# Patient Record
Sex: FEMALE | Race: WHITE | Employment: OTHER | URBAN - METROPOLITAN AREA
[De-identification: names, ages, dates, MRNs, and addresses within clinical notes are randomized per-mention and may not be internally consistent; named-entity substitution may affect disease eponyms.]

---

## 2017-01-30 RX ORDER — CARVEDILOL 25 MG/1
TABLET ORAL
Qty: 180 TABLET | Refills: 3 | Status: SHIPPED | OUTPATIENT
Start: 2017-01-30 | End: 2017-05-22 | Stop reason: SDUPTHER

## 2017-01-30 RX ORDER — LOSARTAN POTASSIUM 100 MG/1
TABLET ORAL
Qty: 90 TABLET | Refills: 3 | Status: SHIPPED | OUTPATIENT
Start: 2017-01-30 | End: 2017-05-22 | Stop reason: SDUPTHER

## 2017-01-30 RX ORDER — FUROSEMIDE 40 MG/1
TABLET ORAL
Qty: 90 TABLET | Refills: 3 | Status: SHIPPED | OUTPATIENT
Start: 2017-01-30 | End: 2017-05-22 | Stop reason: SDUPTHER

## 2017-02-22 ENCOUNTER — OFFICE VISIT (OUTPATIENT)
Dept: ORTHOPEDIC SURGERY | Age: 77
End: 2017-02-22

## 2017-02-22 VITALS
WEIGHT: 249 LBS | BODY MASS INDEX: 41.48 KG/M2 | DIASTOLIC BLOOD PRESSURE: 77 MMHG | HEIGHT: 65 IN | HEART RATE: 75 BPM | SYSTOLIC BLOOD PRESSURE: 173 MMHG

## 2017-02-22 DIAGNOSIS — Z96.651 STATUS POST TOTAL RIGHT KNEE REPLACEMENT: ICD-10-CM

## 2017-02-22 DIAGNOSIS — M17.12 PRIMARY OSTEOARTHRITIS OF LEFT KNEE: Primary | ICD-10-CM

## 2017-02-22 PROCEDURE — 73562 X-RAY EXAM OF KNEE 3: CPT | Performed by: PHYSICIAN ASSISTANT

## 2017-02-22 PROCEDURE — 20610 DRAIN/INJ JOINT/BURSA W/O US: CPT | Performed by: PHYSICIAN ASSISTANT

## 2017-02-22 PROCEDURE — 99214 OFFICE O/P EST MOD 30 MIN: CPT | Performed by: PHYSICIAN ASSISTANT

## 2017-05-15 ENCOUNTER — TELEPHONE (OUTPATIENT)
Dept: INTERNAL MEDICINE | Age: 77
End: 2017-05-15

## 2017-05-15 DIAGNOSIS — R30.0 DYSURIA: Primary | ICD-10-CM

## 2017-05-15 DIAGNOSIS — E78.5 HYPERLIPIDEMIA, UNSPECIFIED HYPERLIPIDEMIA TYPE: ICD-10-CM

## 2017-05-15 DIAGNOSIS — I10 ESSENTIAL HYPERTENSION, BENIGN: ICD-10-CM

## 2017-05-15 LAB
A/G RATIO: 1.6 (ref 1.1–2.2)
ALBUMIN SERPL-MCNC: 4.2 G/DL (ref 3.4–5)
ALP BLD-CCNC: 86 U/L (ref 40–129)
ALT SERPL-CCNC: 15 U/L (ref 10–40)
ANION GAP SERPL CALCULATED.3IONS-SCNC: 12 MMOL/L (ref 3–16)
AST SERPL-CCNC: 14 U/L (ref 15–37)
BACTERIA: ABNORMAL /HPF
BASOPHILS ABSOLUTE: 0 K/UL (ref 0–0.2)
BASOPHILS RELATIVE PERCENT: 1.1 %
BILIRUB SERPL-MCNC: 1.9 MG/DL (ref 0–1)
BILIRUBIN URINE: NEGATIVE
BLOOD, URINE: NEGATIVE
BUN BLDV-MCNC: 15 MG/DL (ref 7–20)
CALCIUM SERPL-MCNC: 9.7 MG/DL (ref 8.3–10.6)
CHLORIDE BLD-SCNC: 102 MMOL/L (ref 99–110)
CHOLESTEROL, TOTAL: 201 MG/DL (ref 0–199)
CLARITY: CLEAR
CO2: 28 MMOL/L (ref 21–32)
COLOR: YELLOW
CREAT SERPL-MCNC: 0.5 MG/DL (ref 0.6–1.2)
EOSINOPHILS ABSOLUTE: 0.2 K/UL (ref 0–0.6)
EOSINOPHILS RELATIVE PERCENT: 4.9 %
EPITHELIAL CELLS, UA: 3 /HPF (ref 0–5)
GFR AFRICAN AMERICAN: >60
GFR NON-AFRICAN AMERICAN: >60
GLOBULIN: 2.7 G/DL
GLUCOSE BLD-MCNC: 93 MG/DL (ref 70–99)
GLUCOSE URINE: NEGATIVE MG/DL
HCT VFR BLD CALC: 42.1 % (ref 36–48)
HDLC SERPL-MCNC: 59 MG/DL (ref 40–60)
HEMOGLOBIN: 14.1 G/DL (ref 12–16)
HYALINE CASTS: 0 /LPF (ref 0–8)
KETONES, URINE: NEGATIVE MG/DL
LDL CHOLESTEROL CALCULATED: 115 MG/DL
LEUKOCYTE ESTERASE, URINE: ABNORMAL
LYMPHOCYTES ABSOLUTE: 1.3 K/UL (ref 1–5.1)
LYMPHOCYTES RELATIVE PERCENT: 28.5 %
MCH RBC QN AUTO: 30.6 PG (ref 26–34)
MCHC RBC AUTO-ENTMCNC: 33.5 G/DL (ref 31–36)
MCV RBC AUTO: 91.6 FL (ref 80–100)
MICROSCOPIC EXAMINATION: YES
MONOCYTES ABSOLUTE: 0.4 K/UL (ref 0–1.3)
MONOCYTES RELATIVE PERCENT: 8.1 %
NEUTROPHILS ABSOLUTE: 2.6 K/UL (ref 1.7–7.7)
NEUTROPHILS RELATIVE PERCENT: 57.4 %
NITRITE, URINE: NEGATIVE
PDW BLD-RTO: 13.5 % (ref 12.4–15.4)
PH UA: 6.5
PLATELET # BLD: 200 K/UL (ref 135–450)
PMV BLD AUTO: 9.2 FL (ref 5–10.5)
POTASSIUM SERPL-SCNC: 5 MMOL/L (ref 3.5–5.1)
PROTEIN UA: NEGATIVE MG/DL
RBC # BLD: 4.59 M/UL (ref 4–5.2)
RBC UA: 1 /HPF (ref 0–4)
SODIUM BLD-SCNC: 142 MMOL/L (ref 136–145)
SPECIFIC GRAVITY UA: 1.01
TOTAL PROTEIN: 6.9 G/DL (ref 6.4–8.2)
TRIGL SERPL-MCNC: 135 MG/DL (ref 0–150)
TSH REFLEX FT4: 3.14 UIU/ML (ref 0.27–4.2)
URINE REFLEX TO CULTURE: YES
URINE TYPE: ABNORMAL
UROBILINOGEN, URINE: 0.2 E.U./DL
VLDLC SERPL CALC-MCNC: 27 MG/DL
WBC # BLD: 4.6 K/UL (ref 4–11)
WBC UA: 4 /HPF (ref 0–5)

## 2017-05-17 LAB
ORGANISM: ABNORMAL
URINE CULTURE, ROUTINE: ABNORMAL

## 2017-05-17 RX ORDER — CIPROFLOXACIN 500 MG/1
500 TABLET, FILM COATED ORAL 2 TIMES DAILY
Qty: 14 TABLET | Refills: 0 | Status: SHIPPED | OUTPATIENT
Start: 2017-05-17 | End: 2018-09-10 | Stop reason: SDUPTHER

## 2017-05-22 ENCOUNTER — OFFICE VISIT (OUTPATIENT)
Dept: INTERNAL MEDICINE | Age: 77
End: 2017-05-22

## 2017-05-22 VITALS
HEIGHT: 65 IN | DIASTOLIC BLOOD PRESSURE: 70 MMHG | HEART RATE: 72 BPM | BODY MASS INDEX: 40.98 KG/M2 | SYSTOLIC BLOOD PRESSURE: 168 MMHG | WEIGHT: 246 LBS

## 2017-05-22 DIAGNOSIS — M17.12 PRIMARY OSTEOARTHRITIS OF LEFT KNEE: ICD-10-CM

## 2017-05-22 DIAGNOSIS — I10 ESSENTIAL HYPERTENSION, BENIGN: ICD-10-CM

## 2017-05-22 DIAGNOSIS — E83.52 HYPERCALCEMIA: ICD-10-CM

## 2017-05-22 DIAGNOSIS — E78.5 HYPERLIPIDEMIA, UNSPECIFIED HYPERLIPIDEMIA TYPE: ICD-10-CM

## 2017-05-22 DIAGNOSIS — R82.71 BACTERIURIA, ASYMPTOMATIC: ICD-10-CM

## 2017-05-22 DIAGNOSIS — I10 ESSENTIAL HYPERTENSION: ICD-10-CM

## 2017-05-22 PROCEDURE — 99214 OFFICE O/P EST MOD 30 MIN: CPT | Performed by: INTERNAL MEDICINE

## 2017-05-22 RX ORDER — TRAMADOL HYDROCHLORIDE 50 MG/1
50 TABLET ORAL EVERY 8 HOURS PRN
Qty: 270 TABLET | Refills: 1 | Status: SHIPPED | OUTPATIENT
Start: 2017-05-22 | End: 2017-07-14 | Stop reason: SDUPTHER

## 2017-05-22 RX ORDER — CARVEDILOL 25 MG/1
TABLET ORAL
Qty: 180 TABLET | Refills: 3 | Status: SHIPPED | OUTPATIENT
Start: 2017-05-22 | End: 2017-05-22 | Stop reason: SDUPTHER

## 2017-05-22 RX ORDER — SPIRONOLACTONE 25 MG/1
25 TABLET ORAL DAILY
Qty: 10 TABLET | Refills: 0 | Status: SHIPPED | OUTPATIENT
Start: 2017-05-22 | End: 2017-05-22 | Stop reason: SDUPTHER

## 2017-05-22 RX ORDER — SPIRONOLACTONE 25 MG/1
25 TABLET ORAL DAILY
Qty: 90 TABLET | Refills: 3 | Status: SHIPPED | OUTPATIENT
Start: 2017-05-22 | End: 2017-11-22

## 2017-05-22 RX ORDER — COLESEVELAM 180 1/1
1875 TABLET ORAL 2 TIMES DAILY WITH MEALS
Qty: 540 TABLET | Refills: 3 | Status: SHIPPED | OUTPATIENT
Start: 2017-05-22 | End: 2017-11-22 | Stop reason: SDUPTHER

## 2017-05-22 RX ORDER — SPIRONOLACTONE 25 MG/1
25 TABLET ORAL DAILY
Qty: 90 TABLET | Refills: 3 | Status: SHIPPED | OUTPATIENT
Start: 2017-05-22 | End: 2017-05-22 | Stop reason: SDUPTHER

## 2017-05-22 RX ORDER — CARVEDILOL 25 MG/1
TABLET ORAL
Qty: 20 TABLET | Refills: 0 | Status: SHIPPED | OUTPATIENT
Start: 2017-05-22 | End: 2017-05-22 | Stop reason: SDUPTHER

## 2017-05-22 RX ORDER — FUROSEMIDE 40 MG/1
TABLET ORAL
Qty: 90 TABLET | Refills: 3 | Status: SHIPPED | OUTPATIENT
Start: 2017-05-22 | End: 2017-11-22 | Stop reason: SDUPTHER

## 2017-05-22 RX ORDER — FUROSEMIDE 40 MG/1
TABLET ORAL
Qty: 90 TABLET | Refills: 3 | Status: SHIPPED | OUTPATIENT
Start: 2017-05-22 | End: 2017-05-22 | Stop reason: SDUPTHER

## 2017-05-22 RX ORDER — CARVEDILOL 25 MG/1
TABLET ORAL
Qty: 180 TABLET | Refills: 3 | Status: SHIPPED | OUTPATIENT
Start: 2017-05-22 | End: 2017-11-22 | Stop reason: SDUPTHER

## 2017-05-22 RX ORDER — LOSARTAN POTASSIUM 100 MG/1
TABLET ORAL
Qty: 90 TABLET | Refills: 3 | Status: SHIPPED | OUTPATIENT
Start: 2017-05-22 | End: 2017-11-22 | Stop reason: SDUPTHER

## 2017-05-22 ASSESSMENT — PATIENT HEALTH QUESTIONNAIRE - PHQ9
2. FEELING DOWN, DEPRESSED OR HOPELESS: 0
SUM OF ALL RESPONSES TO PHQ QUESTIONS 1-9: 0
SUM OF ALL RESPONSES TO PHQ9 QUESTIONS 1 & 2: 0
1. LITTLE INTEREST OR PLEASURE IN DOING THINGS: 0

## 2017-05-22 ASSESSMENT — ENCOUNTER SYMPTOMS: RESPIRATORY NEGATIVE: 1

## 2017-06-21 ENCOUNTER — OFFICE VISIT (OUTPATIENT)
Dept: ORTHOPEDIC SURGERY | Age: 77
End: 2017-06-21

## 2017-06-21 VITALS
SYSTOLIC BLOOD PRESSURE: 182 MMHG | BODY MASS INDEX: 40.98 KG/M2 | TEMPERATURE: 98.6 F | DIASTOLIC BLOOD PRESSURE: 88 MMHG | HEIGHT: 65 IN | HEART RATE: 78 BPM | WEIGHT: 246 LBS

## 2017-06-21 DIAGNOSIS — M17.12 PRIMARY OSTEOARTHRITIS OF LEFT KNEE: Primary | ICD-10-CM

## 2017-06-21 PROCEDURE — 20610 DRAIN/INJ JOINT/BURSA W/O US: CPT | Performed by: PHYSICIAN ASSISTANT

## 2017-06-21 PROCEDURE — 99213 OFFICE O/P EST LOW 20 MIN: CPT | Performed by: PHYSICIAN ASSISTANT

## 2017-07-05 ENCOUNTER — TELEPHONE (OUTPATIENT)
Dept: INTERNAL MEDICINE | Age: 77
End: 2017-07-05

## 2017-07-14 ENCOUNTER — TELEPHONE (OUTPATIENT)
Dept: INTERNAL MEDICINE | Age: 77
End: 2017-07-14

## 2017-07-14 DIAGNOSIS — I10 ESSENTIAL HYPERTENSION, BENIGN: ICD-10-CM

## 2017-07-14 RX ORDER — TRAMADOL HYDROCHLORIDE 50 MG/1
50 TABLET ORAL EVERY 8 HOURS PRN
Qty: 270 TABLET | Refills: 1 | Status: SHIPPED | OUTPATIENT
Start: 2017-07-14 | End: 2022-01-03

## 2017-09-26 ENCOUNTER — OFFICE VISIT (OUTPATIENT)
Dept: ORTHOPEDIC SURGERY | Age: 77
End: 2017-09-26

## 2017-09-26 VITALS — WEIGHT: 246 LBS | HEIGHT: 65 IN | TEMPERATURE: 98.2 F | BODY MASS INDEX: 40.98 KG/M2

## 2017-09-26 DIAGNOSIS — M17.12 PRIMARY OSTEOARTHRITIS OF LEFT KNEE: Primary | ICD-10-CM

## 2017-09-26 PROCEDURE — 20610 DRAIN/INJ JOINT/BURSA W/O US: CPT | Performed by: PHYSICIAN ASSISTANT

## 2017-11-20 DIAGNOSIS — I10 ESSENTIAL HYPERTENSION: ICD-10-CM

## 2017-11-20 DIAGNOSIS — E78.5 HYPERLIPIDEMIA, UNSPECIFIED HYPERLIPIDEMIA TYPE: ICD-10-CM

## 2017-11-20 DIAGNOSIS — E83.52 HYPERCALCEMIA: ICD-10-CM

## 2017-11-20 LAB
A/G RATIO: 1.6 (ref 1.1–2.2)
ALBUMIN SERPL-MCNC: 4.1 G/DL (ref 3.4–5)
ALP BLD-CCNC: 80 U/L (ref 40–129)
ALT SERPL-CCNC: 15 U/L (ref 10–40)
ANION GAP SERPL CALCULATED.3IONS-SCNC: 14 MMOL/L (ref 3–16)
AST SERPL-CCNC: 16 U/L (ref 15–37)
BASOPHILS ABSOLUTE: 0.1 K/UL (ref 0–0.2)
BASOPHILS RELATIVE PERCENT: 1.1 %
BILIRUB SERPL-MCNC: 1.6 MG/DL (ref 0–1)
BUN BLDV-MCNC: 17 MG/DL (ref 7–20)
CALCIUM SERPL-MCNC: 9.6 MG/DL (ref 8.3–10.6)
CHLORIDE BLD-SCNC: 99 MMOL/L (ref 99–110)
CHOLESTEROL, TOTAL: 224 MG/DL (ref 0–199)
CO2: 27 MMOL/L (ref 21–32)
CREAT SERPL-MCNC: 0.6 MG/DL (ref 0.6–1.2)
EOSINOPHILS ABSOLUTE: 0.3 K/UL (ref 0–0.6)
EOSINOPHILS RELATIVE PERCENT: 6.6 %
GFR AFRICAN AMERICAN: >60
GFR NON-AFRICAN AMERICAN: >60
GLOBULIN: 2.6 G/DL
GLUCOSE BLD-MCNC: 93 MG/DL (ref 70–99)
HCT VFR BLD CALC: 40.9 % (ref 36–48)
HDLC SERPL-MCNC: 57 MG/DL (ref 40–60)
HEMOGLOBIN: 14 G/DL (ref 12–16)
LDL CHOLESTEROL CALCULATED: 141 MG/DL
LYMPHOCYTES ABSOLUTE: 1.2 K/UL (ref 1–5.1)
LYMPHOCYTES RELATIVE PERCENT: 26.5 %
MCH RBC QN AUTO: 31.7 PG (ref 26–34)
MCHC RBC AUTO-ENTMCNC: 34.2 G/DL (ref 31–36)
MCV RBC AUTO: 92.6 FL (ref 80–100)
MONOCYTES ABSOLUTE: 0.3 K/UL (ref 0–1.3)
MONOCYTES RELATIVE PERCENT: 7.8 %
NEUTROPHILS ABSOLUTE: 2.6 K/UL (ref 1.7–7.7)
NEUTROPHILS RELATIVE PERCENT: 58 %
PDW BLD-RTO: 13.5 % (ref 12.4–15.4)
PLATELET # BLD: 201 K/UL (ref 135–450)
PMV BLD AUTO: 9 FL (ref 5–10.5)
POTASSIUM SERPL-SCNC: 5.1 MMOL/L (ref 3.5–5.1)
RBC # BLD: 4.42 M/UL (ref 4–5.2)
SODIUM BLD-SCNC: 140 MMOL/L (ref 136–145)
TOTAL PROTEIN: 6.7 G/DL (ref 6.4–8.2)
TRIGL SERPL-MCNC: 130 MG/DL (ref 0–150)
VLDLC SERPL CALC-MCNC: 26 MG/DL
WBC # BLD: 4.5 K/UL (ref 4–11)

## 2017-11-22 ENCOUNTER — OFFICE VISIT (OUTPATIENT)
Dept: INTERNAL MEDICINE | Age: 77
End: 2017-11-22

## 2017-11-22 VITALS
SYSTOLIC BLOOD PRESSURE: 150 MMHG | HEART RATE: 80 BPM | HEIGHT: 65 IN | WEIGHT: 244 LBS | BODY MASS INDEX: 40.65 KG/M2 | DIASTOLIC BLOOD PRESSURE: 80 MMHG

## 2017-11-22 DIAGNOSIS — R82.71 BACTERIURIA, ASYMPTOMATIC: ICD-10-CM

## 2017-11-22 DIAGNOSIS — E66.01 MORBID OBESITY WITH BMI OF 40.0-44.9, ADULT (HCC): ICD-10-CM

## 2017-11-22 DIAGNOSIS — I10 ESSENTIAL HYPERTENSION: ICD-10-CM

## 2017-11-22 DIAGNOSIS — E78.5 HYPERLIPIDEMIA, UNSPECIFIED HYPERLIPIDEMIA TYPE: ICD-10-CM

## 2017-11-22 DIAGNOSIS — E83.52 HYPERCALCEMIA: ICD-10-CM

## 2017-11-22 DIAGNOSIS — I10 ESSENTIAL HYPERTENSION, BENIGN: ICD-10-CM

## 2017-11-22 LAB
BILIRUBIN URINE: NEGATIVE
BLOOD, URINE: NEGATIVE
CLARITY: CLEAR
COLOR: YELLOW
GLUCOSE URINE: NEGATIVE MG/DL
KETONES, URINE: NEGATIVE MG/DL
LEUKOCYTE ESTERASE, URINE: NEGATIVE
MICROSCOPIC EXAMINATION: NORMAL
NITRITE, URINE: NEGATIVE
PH UA: 5
PROTEIN UA: NEGATIVE MG/DL
SPECIFIC GRAVITY UA: 1.01
URINE TYPE: NORMAL
UROBILINOGEN, URINE: 0.2 E.U./DL

## 2017-11-22 PROCEDURE — G8417 CALC BMI ABV UP PARAM F/U: HCPCS | Performed by: INTERNAL MEDICINE

## 2017-11-22 PROCEDURE — 3017F COLORECTAL CA SCREEN DOC REV: CPT | Performed by: INTERNAL MEDICINE

## 2017-11-22 PROCEDURE — G8484 FLU IMMUNIZE NO ADMIN: HCPCS | Performed by: INTERNAL MEDICINE

## 2017-11-22 PROCEDURE — 4040F PNEUMOC VAC/ADMIN/RCVD: CPT | Performed by: INTERNAL MEDICINE

## 2017-11-22 PROCEDURE — 99214 OFFICE O/P EST MOD 30 MIN: CPT | Performed by: INTERNAL MEDICINE

## 2017-11-22 PROCEDURE — G8427 DOCREV CUR MEDS BY ELIG CLIN: HCPCS | Performed by: INTERNAL MEDICINE

## 2017-11-22 PROCEDURE — 1090F PRES/ABSN URINE INCON ASSESS: CPT | Performed by: INTERNAL MEDICINE

## 2017-11-22 PROCEDURE — G8399 PT W/DXA RESULTS DOCUMENT: HCPCS | Performed by: INTERNAL MEDICINE

## 2017-11-22 PROCEDURE — 1036F TOBACCO NON-USER: CPT | Performed by: INTERNAL MEDICINE

## 2017-11-22 PROCEDURE — 1123F ACP DISCUSS/DSCN MKR DOCD: CPT | Performed by: INTERNAL MEDICINE

## 2017-11-22 RX ORDER — CARVEDILOL 25 MG/1
TABLET ORAL
Qty: 180 TABLET | Refills: 3 | Status: SHIPPED | OUTPATIENT
Start: 2017-11-22 | End: 2018-05-23

## 2017-11-22 RX ORDER — COLESEVELAM 180 1/1
1875 TABLET ORAL 2 TIMES DAILY WITH MEALS
Qty: 540 TABLET | Refills: 3 | Status: SHIPPED | OUTPATIENT
Start: 2017-11-22 | End: 2019-08-03 | Stop reason: SDUPTHER

## 2017-11-22 RX ORDER — LOSARTAN POTASSIUM 100 MG/1
TABLET ORAL
Qty: 90 TABLET | Refills: 3 | Status: SHIPPED | OUTPATIENT
Start: 2017-11-22 | End: 2018-01-13 | Stop reason: SDUPTHER

## 2017-11-22 RX ORDER — FUROSEMIDE 40 MG/1
TABLET ORAL
Qty: 90 TABLET | Refills: 3 | Status: SHIPPED | OUTPATIENT
Start: 2017-11-22 | End: 2018-11-27 | Stop reason: CLARIF

## 2017-11-22 ASSESSMENT — ENCOUNTER SYMPTOMS
RESPIRATORY NEGATIVE: 1
GASTROINTESTINAL NEGATIVE: 1

## 2017-11-22 NOTE — PROGRESS NOTES
SUBJECTIVE:    Patient ID: Bere Baptiste is an 78 y.o. female. HPI: Patient here today for the f/u of chronic problems -- see Problem List and associated comments. New issues or complaints include (also see Assessment for more details):  Here for review of labs. Home blood pressure okay. No changes. Review of Systems   Constitutional: Negative for activity change, appetite change and fatigue. Respiratory: Negative. Cardiovascular: Negative. Gastrointestinal: Negative. Genitourinary: Negative for dysuria. Musculoskeletal: Positive for arthralgias. Neurological: Negative for headaches. OBJECTIVE:    BP (!) 150/80 (Site: Left Arm, Position: Sitting, Cuff Size: Large Adult)   Pulse 80   Ht 5' 5\" (1.651 m)   Wt 244 lb (110.7 kg)   BMI 40.60 kg/m²      Physical Exam   Constitutional: She is oriented to person, place, and time. She appears well-developed and well-nourished. No distress. overweight    Eyes: No scleral icterus. Neck: No JVD present. Carotid bruit is not present. Cardiovascular: Normal rate, regular rhythm, normal heart sounds and intact distal pulses. Pulmonary/Chest: Effort normal and breath sounds normal. No stridor. No respiratory distress. Abdominal: She exhibits no abdominal bruit. Musculoskeletal: She exhibits edema (ankles - non pitting). Neurological: She is alert and oriented to person, place, and time. No cranial nerve deficit. Skin: She is not diaphoretic. No pallor. Psychiatric: She has a normal mood and affect. Her behavior is normal. Judgment and thought content normal.       ASSESSMENT:       Encounter Diagnoses   Name Primary?  Essential hypertension, benign     Morbid obesity with BMI of 40.0-44.9, adult (HCC)     Essential hypertension     Hyperlipidemia, unspecified hyperlipidemia type     Hypercalcemia     Bacteriuria, asymptomatic        Hypertension  Home BP very goodno changes.     Hyperlipidemia  Cholesterol and LDL has gone upno changes though and medications continue without Rx. Monitor. Hypercalcemia  Calcium is okay    Bacteriuria, asymptomatic  Unchanged        PLAN:  See ASSESSMENT for evaluation & PLAN    No orders of the defined types were placed in this encounter. PSH, PMH, SH and FH reviewed and noted. Recent and past labs, tests and consults also reviewed. Recent or new meds also reviewed.

## 2017-12-04 RX ORDER — SPIRONOLACTONE 25 MG/1
TABLET ORAL
Qty: 90 TABLET | Refills: 3 | Status: SHIPPED | OUTPATIENT
Start: 2017-12-04 | End: 2018-11-12 | Stop reason: SDUPTHER

## 2018-01-03 ENCOUNTER — OFFICE VISIT (OUTPATIENT)
Dept: ORTHOPEDIC SURGERY | Age: 78
End: 2018-01-03

## 2018-01-03 VITALS — WEIGHT: 246 LBS | BODY MASS INDEX: 40.98 KG/M2 | TEMPERATURE: 98.6 F | HEIGHT: 65 IN

## 2018-01-03 DIAGNOSIS — M17.12 PRIMARY OSTEOARTHRITIS OF LEFT KNEE: Primary | ICD-10-CM

## 2018-01-03 PROCEDURE — 20610 DRAIN/INJ JOINT/BURSA W/O US: CPT | Performed by: PHYSICIAN ASSISTANT

## 2018-02-27 ENCOUNTER — TELEPHONE (OUTPATIENT)
Dept: INTERNAL MEDICINE | Age: 78
End: 2018-02-27

## 2018-02-27 RX ORDER — AZITHROMYCIN 250 MG/1
TABLET, FILM COATED ORAL
Qty: 1 PACKET | Refills: 0 | Status: SHIPPED | OUTPATIENT
Start: 2018-02-27 | End: 2018-03-09

## 2018-02-27 NOTE — TELEPHONE ENCOUNTER
Sounds typical all the viruses are going around. The general recommendation is supportive care, OTC cough medications, and stay hydrated. She can get a Z-Antwon and fillet admitting to secretions turn purulent or the fever goes over 100.

## 2018-04-23 ENCOUNTER — TELEPHONE (OUTPATIENT)
Dept: INTERNAL MEDICINE | Age: 78
End: 2018-04-23

## 2018-04-23 RX ORDER — CARVEDILOL 25 MG/1
TABLET ORAL
Qty: 180 TABLET | Refills: 3 | Status: SHIPPED | OUTPATIENT
Start: 2018-04-23 | End: 2019-03-03 | Stop reason: SDUPTHER

## 2018-05-03 ENCOUNTER — OFFICE VISIT (OUTPATIENT)
Dept: ORTHOPEDIC SURGERY | Age: 78
End: 2018-05-03

## 2018-05-03 VITALS
WEIGHT: 246 LBS | RESPIRATION RATE: 16 BRPM | HEIGHT: 65 IN | DIASTOLIC BLOOD PRESSURE: 68 MMHG | BODY MASS INDEX: 40.98 KG/M2 | SYSTOLIC BLOOD PRESSURE: 146 MMHG | HEART RATE: 84 BPM | TEMPERATURE: 99 F

## 2018-05-03 DIAGNOSIS — M17.12 PRIMARY OSTEOARTHRITIS OF LEFT KNEE: Primary | ICD-10-CM

## 2018-05-03 DIAGNOSIS — Z96.651 HISTORY OF TOTAL KNEE ARTHROPLASTY, RIGHT: ICD-10-CM

## 2018-05-03 PROCEDURE — 20610 DRAIN/INJ JOINT/BURSA W/O US: CPT | Performed by: PHYSICIAN ASSISTANT

## 2018-05-03 PROCEDURE — G8427 DOCREV CUR MEDS BY ELIG CLIN: HCPCS | Performed by: PHYSICIAN ASSISTANT

## 2018-05-03 PROCEDURE — 99212 OFFICE O/P EST SF 10 MIN: CPT | Performed by: PHYSICIAN ASSISTANT

## 2018-05-03 PROCEDURE — 4040F PNEUMOC VAC/ADMIN/RCVD: CPT | Performed by: PHYSICIAN ASSISTANT

## 2018-05-03 PROCEDURE — 1123F ACP DISCUSS/DSCN MKR DOCD: CPT | Performed by: PHYSICIAN ASSISTANT

## 2018-05-03 PROCEDURE — 3017F COLORECTAL CA SCREEN DOC REV: CPT | Performed by: PHYSICIAN ASSISTANT

## 2018-05-03 PROCEDURE — G8417 CALC BMI ABV UP PARAM F/U: HCPCS | Performed by: PHYSICIAN ASSISTANT

## 2018-05-03 PROCEDURE — 1090F PRES/ABSN URINE INCON ASSESS: CPT | Performed by: PHYSICIAN ASSISTANT

## 2018-05-03 PROCEDURE — G8399 PT W/DXA RESULTS DOCUMENT: HCPCS | Performed by: PHYSICIAN ASSISTANT

## 2018-05-03 PROCEDURE — 1036F TOBACCO NON-USER: CPT | Performed by: PHYSICIAN ASSISTANT

## 2018-05-16 DIAGNOSIS — E78.5 HYPERLIPIDEMIA, UNSPECIFIED HYPERLIPIDEMIA TYPE: ICD-10-CM

## 2018-05-16 DIAGNOSIS — E83.52 HYPERCALCEMIA: ICD-10-CM

## 2018-05-16 DIAGNOSIS — I10 ESSENTIAL HYPERTENSION, BENIGN: ICD-10-CM

## 2018-05-16 LAB
A/G RATIO: 1.7 (ref 1.1–2.2)
ALBUMIN SERPL-MCNC: 4.3 G/DL (ref 3.4–5)
ALP BLD-CCNC: 76 U/L (ref 40–129)
ALT SERPL-CCNC: 12 U/L (ref 10–40)
ANION GAP SERPL CALCULATED.3IONS-SCNC: 11 MMOL/L (ref 3–16)
AST SERPL-CCNC: 12 U/L (ref 15–37)
BASOPHILS ABSOLUTE: 0.1 K/UL (ref 0–0.2)
BASOPHILS RELATIVE PERCENT: 1.1 %
BILIRUB SERPL-MCNC: 1.8 MG/DL (ref 0–1)
BUN BLDV-MCNC: 18 MG/DL (ref 7–20)
CALCIUM SERPL-MCNC: 9.4 MG/DL (ref 8.3–10.6)
CHLORIDE BLD-SCNC: 103 MMOL/L (ref 99–110)
CHOLESTEROL, TOTAL: 211 MG/DL (ref 0–199)
CO2: 26 MMOL/L (ref 21–32)
CREAT SERPL-MCNC: 0.5 MG/DL (ref 0.6–1.2)
EOSINOPHILS ABSOLUTE: 0.2 K/UL (ref 0–0.6)
EOSINOPHILS RELATIVE PERCENT: 3.6 %
GFR AFRICAN AMERICAN: >60
GFR NON-AFRICAN AMERICAN: >60
GLOBULIN: 2.5 G/DL
GLUCOSE BLD-MCNC: 92 MG/DL (ref 70–99)
HCT VFR BLD CALC: 42.2 % (ref 36–48)
HDLC SERPL-MCNC: 62 MG/DL (ref 40–60)
HEMOGLOBIN: 14.5 G/DL (ref 12–16)
LDL CHOLESTEROL CALCULATED: 126 MG/DL
LYMPHOCYTES ABSOLUTE: 1.4 K/UL (ref 1–5.1)
LYMPHOCYTES RELATIVE PERCENT: 28.5 %
MCH RBC QN AUTO: 31.5 PG (ref 26–34)
MCHC RBC AUTO-ENTMCNC: 34.4 G/DL (ref 31–36)
MCV RBC AUTO: 91.6 FL (ref 80–100)
MONOCYTES ABSOLUTE: 0.3 K/UL (ref 0–1.3)
MONOCYTES RELATIVE PERCENT: 6.6 %
NEUTROPHILS ABSOLUTE: 3 K/UL (ref 1.7–7.7)
NEUTROPHILS RELATIVE PERCENT: 60.2 %
PDW BLD-RTO: 13.8 % (ref 12.4–15.4)
PLATELET # BLD: 222 K/UL (ref 135–450)
PMV BLD AUTO: 9.1 FL (ref 5–10.5)
POTASSIUM SERPL-SCNC: 4.9 MMOL/L (ref 3.5–5.1)
RBC # BLD: 4.61 M/UL (ref 4–5.2)
SODIUM BLD-SCNC: 140 MMOL/L (ref 136–145)
T4 FREE: 1.1 NG/DL (ref 0.9–1.8)
TOTAL PROTEIN: 6.8 G/DL (ref 6.4–8.2)
TRIGL SERPL-MCNC: 115 MG/DL (ref 0–150)
TSH REFLEX FT4: 4.86 UIU/ML (ref 0.27–4.2)
VLDLC SERPL CALC-MCNC: 23 MG/DL
WBC # BLD: 4.9 K/UL (ref 4–11)

## 2018-05-23 ENCOUNTER — OFFICE VISIT (OUTPATIENT)
Dept: INTERNAL MEDICINE | Age: 78
End: 2018-05-23

## 2018-05-23 VITALS
SYSTOLIC BLOOD PRESSURE: 150 MMHG | WEIGHT: 238 LBS | OXYGEN SATURATION: 98 % | DIASTOLIC BLOOD PRESSURE: 78 MMHG | HEART RATE: 76 BPM | HEIGHT: 65 IN | BODY MASS INDEX: 39.65 KG/M2

## 2018-05-23 DIAGNOSIS — E78.5 HYPERLIPIDEMIA, UNSPECIFIED HYPERLIPIDEMIA TYPE: ICD-10-CM

## 2018-05-23 DIAGNOSIS — I10 ESSENTIAL HYPERTENSION: ICD-10-CM

## 2018-05-23 DIAGNOSIS — R82.71 BACTERIURIA, ASYMPTOMATIC: Primary | ICD-10-CM

## 2018-05-23 DIAGNOSIS — E83.52 HYPERCALCEMIA: ICD-10-CM

## 2018-05-23 PROCEDURE — 3288F FALL RISK ASSESSMENT DOCD: CPT | Performed by: INTERNAL MEDICINE

## 2018-05-23 PROCEDURE — 1036F TOBACCO NON-USER: CPT | Performed by: INTERNAL MEDICINE

## 2018-05-23 PROCEDURE — G8399 PT W/DXA RESULTS DOCUMENT: HCPCS | Performed by: INTERNAL MEDICINE

## 2018-05-23 PROCEDURE — G8417 CALC BMI ABV UP PARAM F/U: HCPCS | Performed by: INTERNAL MEDICINE

## 2018-05-23 PROCEDURE — G8510 SCR DEP NEG, NO PLAN REQD: HCPCS | Performed by: INTERNAL MEDICINE

## 2018-05-23 PROCEDURE — 1090F PRES/ABSN URINE INCON ASSESS: CPT | Performed by: INTERNAL MEDICINE

## 2018-05-23 PROCEDURE — 1123F ACP DISCUSS/DSCN MKR DOCD: CPT | Performed by: INTERNAL MEDICINE

## 2018-05-23 PROCEDURE — 4040F PNEUMOC VAC/ADMIN/RCVD: CPT | Performed by: INTERNAL MEDICINE

## 2018-05-23 PROCEDURE — 99214 OFFICE O/P EST MOD 30 MIN: CPT | Performed by: INTERNAL MEDICINE

## 2018-05-23 PROCEDURE — G8427 DOCREV CUR MEDS BY ELIG CLIN: HCPCS | Performed by: INTERNAL MEDICINE

## 2018-05-23 ASSESSMENT — PATIENT HEALTH QUESTIONNAIRE - PHQ9
SUM OF ALL RESPONSES TO PHQ9 QUESTIONS 1 & 2: 0
2. FEELING DOWN, DEPRESSED OR HOPELESS: 0
1. LITTLE INTEREST OR PLEASURE IN DOING THINGS: 0
SUM OF ALL RESPONSES TO PHQ QUESTIONS 1-9: 0

## 2018-05-23 ASSESSMENT — ENCOUNTER SYMPTOMS
GASTROINTESTINAL NEGATIVE: 1
RESPIRATORY NEGATIVE: 1

## 2018-07-26 RX ORDER — SPIRONOLACTONE 25 MG/1
25 TABLET ORAL DAILY
Qty: 90 TABLET | Refills: 2 | Status: SHIPPED | OUTPATIENT
Start: 2018-07-26 | End: 2019-03-03 | Stop reason: SDUPTHER

## 2018-09-04 ENCOUNTER — OFFICE VISIT (OUTPATIENT)
Dept: ORTHOPEDIC SURGERY | Age: 78
End: 2018-09-04

## 2018-09-04 VITALS
BODY MASS INDEX: 36.07 KG/M2 | WEIGHT: 238 LBS | TEMPERATURE: 98.6 F | HEART RATE: 80 BPM | DIASTOLIC BLOOD PRESSURE: 72 MMHG | SYSTOLIC BLOOD PRESSURE: 150 MMHG | HEIGHT: 68 IN

## 2018-09-04 DIAGNOSIS — M17.12 PRIMARY OSTEOARTHRITIS OF LEFT KNEE: Primary | ICD-10-CM

## 2018-09-04 PROCEDURE — 20610 DRAIN/INJ JOINT/BURSA W/O US: CPT | Performed by: PHYSICIAN ASSISTANT

## 2018-09-04 NOTE — PROGRESS NOTES
Subjective:      Patient ID: Mandie Costa is a 70 y.o.  female. Chief Complaint   Patient presents with    Joint Pain     Left knee arthritis. HPI: She is here for evaluation and treatment of left knee pain related to arthritis. She states the previous injection which was performed 5/3/18 helped relieve the knee symptoms. The knee pain has gradually returned. There has not been a recent injury. Pain is 5/10. Pain is worse with activity. Pain improves somewhat with rest and elevation. Review of Systems:   Negative for fever or chills. Negative for numbness or tingling around the knee. Past Medical History:   Diagnosis Date    Arthritis     Cataract     High blood pressure     Hyperlipidemia     stress test 2007    normal       Family History   Problem Relation Age of Onset    Cancer Other     High Blood Pressure Other        Past Surgical History:   Procedure Laterality Date    EYE SURGERY  1985    L eye - injury    JOINT REPLACEMENT      R TKR - Dr Janet Maxwell Not on file.      Social History Main Topics    Smoking status: Never Smoker    Smokeless tobacco: Never Used    Alcohol use No    Drug use: No    Sexual activity: Not on file       Current Outpatient Prescriptions   Medication Sig Dispense Refill    spironolactone (ALDACTONE) 25 MG tablet TAKE 1 TABLET BY MOUTH  DAILY 90 tablet 2    furosemide (LASIX) 40 MG tablet TAKE 1 TABLET BY MOUTH  DAILY 90 tablet 0    carvedilol (COREG) 25 MG tablet TAKE 1 TABLET BY MOUTH TWO  TIMES DAILY 180 tablet 3    losartan (COZAAR) 100 MG tablet TAKE 1 TABLET BY MOUTH  DAILY 90 tablet 3    spironolactone (ALDACTONE) 25 MG tablet TAKE 1 TABLET BY MOUTH EVERY DAY 90 tablet 3    colesevelam (WELCHOL) 625 MG tablet Take 3 tablets by mouth 2 times daily (with meals) 540 tablet 3    furosemide (LASIX) 40 MG tablet Take 1 tablet by mouth  daily 90 tablet 3    traMADol (ULTRAM) 50 MG tablet Take 1 tablet by mouth every 8 hours as needed for Pain 270 tablet 1    aspirin 81 MG tablet Take 81 mg by mouth daily.  Ascorbic Acid (VITAMIN C) 1000 MG tablet Take 1,000 mg by mouth daily.  calcium-vitamin D 500 MG tablet Take 1 tablet by mouth 2 times daily.  Multiple Vitamin (MULTIVITAMIN PO) Take  by mouth. No current facility-administered medications for this visit. Objective:     She is alert, oriented x 3, pleasant, well nourished, developed and in no acute distress. BP (!) 150/72   Pulse 80   Temp 98.6 °F (37 °C) (Temporal)   Ht 5' 8\" (1.727 m)   Wt 238 lb (108 kg)   BMI 36.19 kg/m²      KNEE EXAM:  Examination of the left knee shows: There is not erythema. ROM- decreased range of motion is noted. There is mild to moderate pain associated with ROM testing. Extensor Mechanism is  intact. X Rays: was not performed in the office today:       Assessment:       ICD-10-CM ICD-9-CM    1. Primary osteoarthritis of left knee M17.12 715.16 VT ARTHROCENTESIS ASPIR&/INJ MAJOR JT/BURSA W/O US      VT TRIAMCINOLONE ACETONIDE INJ        Plan:     The natural history of the patient's diagnosis as well as the treatment options were discussed in full and questions were answered. Risks and benefits of the treatment options also reviewed in detail. May increase blood sugars in diabetics. She understands that eventually knee arthroplasty will be the surgical correction needed to control the pain related to the arthritic knee condition. And when the injections fail to control the symptoms then surgical correction should be considered. Risk and benefits of corticosteroid intra-articular injection was discussed today. All questions were answered to her satisfaction. She verbally consented to proceed with intra-articular injection today.       Cortisone Injection                                                       PROCEDURE NOTE:     Pre op Diagnosis: Knee pain/ DJD left Knee     Post

## 2018-09-10 ENCOUNTER — TELEPHONE (OUTPATIENT)
Dept: INTERNAL MEDICINE | Age: 78
End: 2018-09-10

## 2018-09-10 RX ORDER — CIPROFLOXACIN 500 MG/1
500 TABLET, FILM COATED ORAL 2 TIMES DAILY
Qty: 14 TABLET | Refills: 0 | Status: SHIPPED | OUTPATIENT
Start: 2018-09-10 | End: 2018-09-17

## 2018-09-10 NOTE — TELEPHONE ENCOUNTER
Patient reports that she may have a UTI. Symptoms for the last 4 days: Burning and pain with urination, including an increase in the frequency patient urinates and odor. She request med Ciprofloxacin be prescribed. [id number]- [place], [address] [place] [address].  [facility] [phone] - F [phone]

## 2018-10-10 ENCOUNTER — TELEPHONE (OUTPATIENT)
Dept: INTERNAL MEDICINE CLINIC | Age: 78
End: 2018-10-10

## 2018-11-12 RX ORDER — LOSARTAN POTASSIUM 100 MG/1
TABLET ORAL
Qty: 90 TABLET | Refills: 0 | Status: SHIPPED | OUTPATIENT
Start: 2018-11-12 | End: 2019-03-03 | Stop reason: SDUPTHER

## 2018-11-20 DIAGNOSIS — E83.52 HYPERCALCEMIA: ICD-10-CM

## 2018-11-20 DIAGNOSIS — I10 ESSENTIAL HYPERTENSION: ICD-10-CM

## 2018-11-20 DIAGNOSIS — E78.5 HYPERLIPIDEMIA, UNSPECIFIED HYPERLIPIDEMIA TYPE: ICD-10-CM

## 2018-11-20 LAB
A/G RATIO: 1.5 (ref 1.1–2.2)
ALBUMIN SERPL-MCNC: 4 G/DL (ref 3.4–5)
ALP BLD-CCNC: 72 U/L (ref 40–129)
ALT SERPL-CCNC: 13 U/L (ref 10–40)
ANION GAP SERPL CALCULATED.3IONS-SCNC: 13 MMOL/L (ref 3–16)
AST SERPL-CCNC: 14 U/L (ref 15–37)
BASOPHILS ABSOLUTE: 0.1 K/UL (ref 0–0.2)
BASOPHILS RELATIVE PERCENT: 1.6 %
BILIRUB SERPL-MCNC: 1.6 MG/DL (ref 0–1)
BILIRUBIN URINE: NEGATIVE
BLOOD, URINE: NEGATIVE
BUN BLDV-MCNC: 15 MG/DL (ref 7–20)
CALCIUM SERPL-MCNC: 9.8 MG/DL (ref 8.3–10.6)
CHLORIDE BLD-SCNC: 101 MMOL/L (ref 99–110)
CHOLESTEROL, TOTAL: 201 MG/DL (ref 0–199)
CLARITY: CLEAR
CO2: 24 MMOL/L (ref 21–32)
COLOR: YELLOW
CREAT SERPL-MCNC: 0.6 MG/DL (ref 0.6–1.2)
EOSINOPHILS ABSOLUTE: 0.2 K/UL (ref 0–0.6)
EOSINOPHILS RELATIVE PERCENT: 4.6 %
GFR AFRICAN AMERICAN: >60
GFR NON-AFRICAN AMERICAN: >60
GLOBULIN: 2.6 G/DL
GLUCOSE BLD-MCNC: 96 MG/DL (ref 70–99)
GLUCOSE URINE: NEGATIVE MG/DL
HCT VFR BLD CALC: 40.2 % (ref 36–48)
HDLC SERPL-MCNC: 48 MG/DL (ref 40–60)
HEMOGLOBIN: 13.7 G/DL (ref 12–16)
KETONES, URINE: NEGATIVE MG/DL
LDL CHOLESTEROL CALCULATED: 123 MG/DL
LEUKOCYTE ESTERASE, URINE: ABNORMAL
LYMPHOCYTES ABSOLUTE: 1.2 K/UL (ref 1–5.1)
LYMPHOCYTES RELATIVE PERCENT: 27.1 %
MCH RBC QN AUTO: 31.1 PG (ref 26–34)
MCHC RBC AUTO-ENTMCNC: 34.1 G/DL (ref 31–36)
MCV RBC AUTO: 91 FL (ref 80–100)
MICROSCOPIC EXAMINATION: YES
MONOCYTES ABSOLUTE: 0.3 K/UL (ref 0–1.3)
MONOCYTES RELATIVE PERCENT: 6.8 %
NEUTROPHILS ABSOLUTE: 2.7 K/UL (ref 1.7–7.7)
NEUTROPHILS RELATIVE PERCENT: 59.9 %
NITRITE, URINE: NEGATIVE
PDW BLD-RTO: 13.3 % (ref 12.4–15.4)
PH UA: 5.5
PLATELET # BLD: 205 K/UL (ref 135–450)
PMV BLD AUTO: 9.3 FL (ref 5–10.5)
POTASSIUM SERPL-SCNC: 4.4 MMOL/L (ref 3.5–5.1)
PROTEIN UA: NEGATIVE MG/DL
RBC # BLD: 4.42 M/UL (ref 4–5.2)
SODIUM BLD-SCNC: 138 MMOL/L (ref 136–145)
SPECIFIC GRAVITY UA: 1.02
TOTAL PROTEIN: 6.6 G/DL (ref 6.4–8.2)
TRIGL SERPL-MCNC: 149 MG/DL (ref 0–150)
URINE REFLEX TO CULTURE: YES
URINE TYPE: ABNORMAL
UROBILINOGEN, URINE: 0.2 E.U./DL
VLDLC SERPL CALC-MCNC: 30 MG/DL
WBC # BLD: 4.5 K/UL (ref 4–11)

## 2018-11-22 LAB
BACTERIA: ABNORMAL /HPF
EPITHELIAL CELLS, UA: ABNORMAL /HPF
RBC UA: ABNORMAL /HPF (ref 0–2)
URINE CULTURE, ROUTINE: NORMAL
WBC UA: ABNORMAL /HPF (ref 0–5)

## 2018-11-27 ENCOUNTER — OFFICE VISIT (OUTPATIENT)
Dept: INTERNAL MEDICINE CLINIC | Age: 78
End: 2018-11-27
Payer: MEDICARE

## 2018-11-27 VITALS
SYSTOLIC BLOOD PRESSURE: 138 MMHG | BODY MASS INDEX: 40.98 KG/M2 | DIASTOLIC BLOOD PRESSURE: 78 MMHG | HEIGHT: 65 IN | WEIGHT: 246 LBS

## 2018-11-27 DIAGNOSIS — R82.71 BACTERIURIA, ASYMPTOMATIC: ICD-10-CM

## 2018-11-27 DIAGNOSIS — E66.01 MORBID OBESITY WITH BMI OF 40.0-44.9, ADULT (HCC): ICD-10-CM

## 2018-11-27 DIAGNOSIS — E78.5 HYPERLIPIDEMIA, UNSPECIFIED HYPERLIPIDEMIA TYPE: Primary | ICD-10-CM

## 2018-11-27 DIAGNOSIS — I10 ESSENTIAL HYPERTENSION: ICD-10-CM

## 2018-11-27 DIAGNOSIS — E83.52 HYPERCALCEMIA: ICD-10-CM

## 2018-11-27 DIAGNOSIS — Z23 NEED FOR PNEUMOCOCCAL VACCINE: ICD-10-CM

## 2018-11-27 DIAGNOSIS — M17.12 PRIMARY OSTEOARTHRITIS OF LEFT KNEE: ICD-10-CM

## 2018-11-27 PROCEDURE — G8427 DOCREV CUR MEDS BY ELIG CLIN: HCPCS | Performed by: INTERNAL MEDICINE

## 2018-11-27 PROCEDURE — 1090F PRES/ABSN URINE INCON ASSESS: CPT | Performed by: INTERNAL MEDICINE

## 2018-11-27 PROCEDURE — 4040F PNEUMOC VAC/ADMIN/RCVD: CPT | Performed by: INTERNAL MEDICINE

## 2018-11-27 PROCEDURE — 90732 PPSV23 VACC 2 YRS+ SUBQ/IM: CPT | Performed by: INTERNAL MEDICINE

## 2018-11-27 PROCEDURE — G8482 FLU IMMUNIZE ORDER/ADMIN: HCPCS | Performed by: INTERNAL MEDICINE

## 2018-11-27 PROCEDURE — 1123F ACP DISCUSS/DSCN MKR DOCD: CPT | Performed by: INTERNAL MEDICINE

## 2018-11-27 PROCEDURE — 1036F TOBACCO NON-USER: CPT | Performed by: INTERNAL MEDICINE

## 2018-11-27 PROCEDURE — 1101F PT FALLS ASSESS-DOCD LE1/YR: CPT | Performed by: INTERNAL MEDICINE

## 2018-11-27 PROCEDURE — G8417 CALC BMI ABV UP PARAM F/U: HCPCS | Performed by: INTERNAL MEDICINE

## 2018-11-27 PROCEDURE — 99214 OFFICE O/P EST MOD 30 MIN: CPT | Performed by: INTERNAL MEDICINE

## 2018-11-27 PROCEDURE — G0009 ADMIN PNEUMOCOCCAL VACCINE: HCPCS | Performed by: INTERNAL MEDICINE

## 2018-11-27 PROCEDURE — G8399 PT W/DXA RESULTS DOCUMENT: HCPCS | Performed by: INTERNAL MEDICINE

## 2018-11-27 ASSESSMENT — ENCOUNTER SYMPTOMS: RESPIRATORY NEGATIVE: 1

## 2019-01-09 ENCOUNTER — OFFICE VISIT (OUTPATIENT)
Dept: ORTHOPEDIC SURGERY | Age: 79
End: 2019-01-09
Payer: MEDICARE

## 2019-01-09 VITALS
TEMPERATURE: 98.2 F | WEIGHT: 244.8 LBS | HEIGHT: 68 IN | SYSTOLIC BLOOD PRESSURE: 140 MMHG | BODY MASS INDEX: 37.1 KG/M2 | DIASTOLIC BLOOD PRESSURE: 63 MMHG | HEART RATE: 76 BPM

## 2019-01-09 DIAGNOSIS — M17.12 PRIMARY OSTEOARTHRITIS OF LEFT KNEE: Primary | ICD-10-CM

## 2019-01-09 PROCEDURE — 20610 DRAIN/INJ JOINT/BURSA W/O US: CPT | Performed by: PHYSICIAN ASSISTANT

## 2019-03-04 RX ORDER — CARVEDILOL 25 MG/1
TABLET ORAL
Qty: 180 TABLET | Refills: 3 | Status: SHIPPED | OUTPATIENT
Start: 2019-03-04 | End: 2020-03-16 | Stop reason: SDUPTHER

## 2019-03-04 RX ORDER — SPIRONOLACTONE 25 MG/1
25 TABLET ORAL DAILY
Qty: 90 TABLET | Refills: 2 | Status: SHIPPED | OUTPATIENT
Start: 2019-03-04 | End: 2020-04-30

## 2019-03-04 RX ORDER — FUROSEMIDE 40 MG/1
TABLET ORAL
Qty: 90 TABLET | Refills: 0 | Status: SHIPPED | OUTPATIENT
Start: 2019-03-04 | End: 2019-07-01 | Stop reason: SDUPTHER

## 2019-03-04 RX ORDER — LOSARTAN POTASSIUM 100 MG/1
TABLET ORAL
Qty: 90 TABLET | Refills: 0 | Status: SHIPPED | OUTPATIENT
Start: 2019-03-04 | End: 2019-05-13 | Stop reason: SDUPTHER

## 2019-05-08 ENCOUNTER — OFFICE VISIT (OUTPATIENT)
Dept: ORTHOPEDIC SURGERY | Age: 79
End: 2019-05-08
Payer: MEDICARE

## 2019-05-08 VITALS
WEIGHT: 244.8 LBS | BODY MASS INDEX: 37.1 KG/M2 | HEART RATE: 71 BPM | TEMPERATURE: 99 F | SYSTOLIC BLOOD PRESSURE: 175 MMHG | DIASTOLIC BLOOD PRESSURE: 79 MMHG | HEIGHT: 68 IN | RESPIRATION RATE: 16 BRPM

## 2019-05-08 DIAGNOSIS — M17.12 PRIMARY OSTEOARTHRITIS OF LEFT KNEE: Primary | ICD-10-CM

## 2019-05-08 PROCEDURE — 1123F ACP DISCUSS/DSCN MKR DOCD: CPT | Performed by: PHYSICIAN ASSISTANT

## 2019-05-08 PROCEDURE — 4040F PNEUMOC VAC/ADMIN/RCVD: CPT | Performed by: PHYSICIAN ASSISTANT

## 2019-05-08 PROCEDURE — G8399 PT W/DXA RESULTS DOCUMENT: HCPCS | Performed by: PHYSICIAN ASSISTANT

## 2019-05-08 PROCEDURE — 20610 DRAIN/INJ JOINT/BURSA W/O US: CPT | Performed by: PHYSICIAN ASSISTANT

## 2019-05-08 PROCEDURE — 1036F TOBACCO NON-USER: CPT | Performed by: PHYSICIAN ASSISTANT

## 2019-05-08 PROCEDURE — 99213 OFFICE O/P EST LOW 20 MIN: CPT | Performed by: PHYSICIAN ASSISTANT

## 2019-05-08 PROCEDURE — 1090F PRES/ABSN URINE INCON ASSESS: CPT | Performed by: PHYSICIAN ASSISTANT

## 2019-05-08 PROCEDURE — G8417 CALC BMI ABV UP PARAM F/U: HCPCS | Performed by: PHYSICIAN ASSISTANT

## 2019-05-08 PROCEDURE — G8427 DOCREV CUR MEDS BY ELIG CLIN: HCPCS | Performed by: PHYSICIAN ASSISTANT

## 2019-05-09 NOTE — PROGRESS NOTES
Subjective:      Patient ID: David Pagan is a 70 y.o.  female. Chief Complaint   Patient presents with    Follow-up     L knee/ last cortisone injection 1.9.19        HPI: She is here for evaluation and treatment of left knee pain related to arthritis. She states the previous injection which was performed 1/9/2019  helped relieve the knee symptoms. The knee pain has gradually returned. There has not been a recent injury. Pain is 5/10. Pain is worse with activity. Pain improves somewhat with rest and elevation. It has been over a year since x-rays and clinical exam has been performed for knee arthritic complaints. Review of Systems:   Negative for fever or chills. Negative for numbness or tingling around the knee.     Past Medical History:   Diagnosis Date    Arthritis     Cataract     High blood pressure     Hyperlipidemia     stress test 2007    normal       Family History   Problem Relation Age of Onset    Cancer Other     High Blood Pressure Other        Past Surgical History:   Procedure Laterality Date    EYE SURGERY  1985    L eye - injury    JOINT REPLACEMENT      R TKR - Dr Reymundo Peralta Not on file   Tobacco Use    Smoking status: Never Smoker    Smokeless tobacco: Never Used   Substance and Sexual Activity    Alcohol use: No     Alcohol/week: 0.0 oz    Drug use: No    Sexual activity: Not on file       Current Outpatient Medications   Medication Sig Dispense Refill    furosemide (LASIX) 40 MG tablet TAKE 1 TABLET BY MOUTH  DAILY 90 tablet 0    losartan (COZAAR) 100 MG tablet TAKE 1 TABLET BY MOUTH  DAILY 90 tablet 0    spironolactone (ALDACTONE) 25 MG tablet TAKE 1 TABLET BY MOUTH  DAILY 90 tablet 2    carvedilol (COREG) 25 MG tablet TAKE 1 TABLET BY MOUTH TWO  TIMES DAILY 180 tablet 3    colesevelam (WELCHOL) 625 MG tablet Take 3 tablets by mouth 2 times daily (with meals) 540 tablet 3    traMADol (ULTRAM) 50 MG tablet Take 1 tablet by mouth every 8 hours as needed for Pain 270 tablet 1    aspirin 81 MG tablet Take 81 mg by mouth daily.  Ascorbic Acid (VITAMIN C) 1000 MG tablet Take 1,000 mg by mouth daily.  calcium-vitamin D 500 MG tablet Take 1 tablet by mouth 2 times daily.  Multiple Vitamin (MULTIVITAMIN PO) Take  by mouth. No current facility-administered medications for this visit. Objective:   She is alert, oriented x 3, pleasant, well nourished, developed and in no acute distress. BP (!) 175/79   Pulse 71   Temp 99 °F (37.2 °C) (Tympanic)   Resp 16   Ht 5' 8\" (1.727 m)   Wt 244 lb 12.8 oz (111 kg)   BMI 37.22 kg/m²      KNEE EXAM:  Examination of the left knee shows: There is not erythema. ROM- decreased range of motion is noted. There is mild to moderate pain associated with ROM testing. Extensor Mechanism is  intact. NEUROLOGICAL EXAM:  Examination of the lower extremities are intact with sensation to light touch. Motor testing  5/5 in all major motor groups of the lower extremities. Gait is normal heel to toe. Gait is antalgic. Negative Bourgeois's Sign. SLR negative. VASCULAR EXAM:  Examination of the lower extremities shows intact perfusion to all extremities. No cyanosis. Digits are warm to touch, capillary refill is less than 2 seconds. mild edema noted. SKIN:  Examination of the skin over both lower extremities reveals: The skin to be intact without lacerations or abrasions. No significant erythema. No rashes or skin lesions. X Rays: was performed in the office today:   AP Standing, Lateral and Sunrise views of left knee: There is advanced osteoarthritic findings of the left knee noted with grade 4 arthritic changes of the patellofemoral compartment. Grade 2-3 changes of the medial and lateral compartments. No acute fractures or dislocations noted. Assessment:       ICD-10-CM    1.  Primary osteoarthritis of left knee M17.12 XR KNEE LEFT (3

## 2019-05-15 DIAGNOSIS — I10 ESSENTIAL HYPERTENSION: ICD-10-CM

## 2019-05-15 DIAGNOSIS — E83.52 HYPERCALCEMIA: ICD-10-CM

## 2019-05-15 DIAGNOSIS — E78.5 HYPERLIPIDEMIA, UNSPECIFIED HYPERLIPIDEMIA TYPE: ICD-10-CM

## 2019-05-15 LAB
A/G RATIO: 1.1 (ref 1.1–2.2)
ALBUMIN SERPL-MCNC: 3.8 G/DL (ref 3.4–5)
ALP BLD-CCNC: 83 U/L (ref 40–129)
ALT SERPL-CCNC: 11 U/L (ref 10–40)
ANION GAP SERPL CALCULATED.3IONS-SCNC: 13 MMOL/L (ref 3–16)
AST SERPL-CCNC: 18 U/L (ref 15–37)
BILIRUB SERPL-MCNC: 1.4 MG/DL (ref 0–1)
BUN BLDV-MCNC: 18 MG/DL (ref 7–20)
CALCIUM SERPL-MCNC: 9.7 MG/DL (ref 8.3–10.6)
CHLORIDE BLD-SCNC: 104 MMOL/L (ref 99–110)
CHOLESTEROL, TOTAL: 193 MG/DL (ref 0–199)
CO2: 22 MMOL/L (ref 21–32)
CREAT SERPL-MCNC: 0.7 MG/DL (ref 0.6–1.2)
GFR AFRICAN AMERICAN: >60
GFR NON-AFRICAN AMERICAN: >60
GLOBULIN: 3.5 G/DL
GLUCOSE BLD-MCNC: 93 MG/DL (ref 70–99)
HDLC SERPL-MCNC: 55 MG/DL (ref 40–60)
LDL CHOLESTEROL CALCULATED: 115 MG/DL
POTASSIUM SERPL-SCNC: 5.2 MMOL/L (ref 3.5–5.1)
SODIUM BLD-SCNC: 139 MMOL/L (ref 136–145)
T4 FREE: 1.1 NG/DL (ref 0.9–1.8)
TOTAL PROTEIN: 7.3 G/DL (ref 6.4–8.2)
TRIGL SERPL-MCNC: 115 MG/DL (ref 0–150)
TSH REFLEX FT4: 5.08 UIU/ML (ref 0.27–4.2)
VLDLC SERPL CALC-MCNC: 23 MG/DL

## 2019-05-23 ENCOUNTER — OFFICE VISIT (OUTPATIENT)
Dept: INTERNAL MEDICINE CLINIC | Age: 79
End: 2019-05-23
Payer: MEDICARE

## 2019-05-23 VITALS
HEART RATE: 77 BPM | BODY MASS INDEX: 39.82 KG/M2 | DIASTOLIC BLOOD PRESSURE: 70 MMHG | SYSTOLIC BLOOD PRESSURE: 140 MMHG | HEIGHT: 65 IN | OXYGEN SATURATION: 98 % | WEIGHT: 239 LBS

## 2019-05-23 DIAGNOSIS — E83.52 HYPERCALCEMIA: ICD-10-CM

## 2019-05-23 DIAGNOSIS — I10 ESSENTIAL HYPERTENSION, BENIGN: ICD-10-CM

## 2019-05-23 DIAGNOSIS — I10 ESSENTIAL HYPERTENSION: Primary | ICD-10-CM

## 2019-05-23 DIAGNOSIS — Z12.5 SPECIAL SCREENING FOR MALIGNANT NEOPLASM OF PROSTATE: ICD-10-CM

## 2019-05-23 DIAGNOSIS — E78.5 HYPERLIPIDEMIA, UNSPECIFIED HYPERLIPIDEMIA TYPE: ICD-10-CM

## 2019-05-23 DIAGNOSIS — R82.71 BACTERIURIA, ASYMPTOMATIC: ICD-10-CM

## 2019-05-23 DIAGNOSIS — Z00.00 PREVENTATIVE HEALTH CARE: ICD-10-CM

## 2019-05-23 PROCEDURE — G8427 DOCREV CUR MEDS BY ELIG CLIN: HCPCS | Performed by: INTERNAL MEDICINE

## 2019-05-23 PROCEDURE — 1036F TOBACCO NON-USER: CPT | Performed by: INTERNAL MEDICINE

## 2019-05-23 PROCEDURE — 1090F PRES/ABSN URINE INCON ASSESS: CPT | Performed by: INTERNAL MEDICINE

## 2019-05-23 PROCEDURE — G8417 CALC BMI ABV UP PARAM F/U: HCPCS | Performed by: INTERNAL MEDICINE

## 2019-05-23 PROCEDURE — 1123F ACP DISCUSS/DSCN MKR DOCD: CPT | Performed by: INTERNAL MEDICINE

## 2019-05-23 PROCEDURE — G8399 PT W/DXA RESULTS DOCUMENT: HCPCS | Performed by: INTERNAL MEDICINE

## 2019-05-23 PROCEDURE — 99214 OFFICE O/P EST MOD 30 MIN: CPT | Performed by: INTERNAL MEDICINE

## 2019-05-23 PROCEDURE — 4040F PNEUMOC VAC/ADMIN/RCVD: CPT | Performed by: INTERNAL MEDICINE

## 2019-05-23 ASSESSMENT — ENCOUNTER SYMPTOMS
RESPIRATORY NEGATIVE: 1
GASTROINTESTINAL NEGATIVE: 1

## 2019-05-23 ASSESSMENT — PATIENT HEALTH QUESTIONNAIRE - PHQ9
2. FEELING DOWN, DEPRESSED OR HOPELESS: 0
1. LITTLE INTEREST OR PLEASURE IN DOING THINGS: 0
SUM OF ALL RESPONSES TO PHQ QUESTIONS 1-9: 0
SUM OF ALL RESPONSES TO PHQ QUESTIONS 1-9: 0
SUM OF ALL RESPONSES TO PHQ9 QUESTIONS 1 & 2: 0

## 2019-05-23 NOTE — PROGRESS NOTES
SUBJECTIVE:  Patient ID: Althea Adhikari is an 70 y.o. female. HPI: Patient here today for the f/u of chronic problems-- see Problem List and associated comments. New issues or complaints include (alsosee Assessment for more details):  Here for follow-up on her labs. She has lost some weight. She overall feels very well. No cardiovascular or pulmonary problems. Energy level is okay. Compliant with medications. Review of Systems   Constitutional: Negative. Respiratory: Negative. Cardiovascular: Negative. Gastrointestinal: Negative. Genitourinary: Negative. Musculoskeletal: Positive for arthralgias. Neurological: Negative for weakness, numbness and headaches. Psychiatric/Behavioral: Negative. OBJECTIVE:    BP (!) 140/70 (Site: Right Upper Arm, Position: Sitting, Cuff Size: Large Adult)   Pulse 77   Ht 5' 5\" (1.651 m)   Wt 239 lb (108.4 kg)   SpO2 98%   BMI 39.77 kg/m²      Physical Exam   Constitutional: She is oriented to person, place, and time. She appears well-developed and well-nourished. No distress. overweight    Eyes: No scleral icterus. Neck: No JVD present. Carotid bruit is not present. Cardiovascular: Normal rate, regular rhythm, normal heart sounds and intact distal pulses. Pulmonary/Chest: Effort normal and breath sounds normal. No stridor. No respiratory distress. Abdominal: She exhibits no abdominal bruit. Musculoskeletal: She exhibits no edema. Neurological: She is alert and oriented to person, place, and time. No cranial nerve deficit. Skin: She is not diaphoretic. No pallor. Psychiatric: She has a normal mood and affect. Her behavior is normal. Judgment and thought content normal.       ASSESSMENT:       Encounter Diagnoses   Name Primary?     Essential hypertension Yes    Hyperlipidemia, unspecified hyperlipidemia type     Hypercalcemia     Bacteriuria, asymptomatic     Essential hypertension, benign     Preventative health care     Special screening for malignant neoplasm of prostate        Hypertension  BP okay-continue Rx    Hyperlipidemia  Lipids okay. Statin intolerance. Continue WelChol. Hypercalcemia  Calcium level okay        PLAN:See ASSESSMENT for evaluation & PLAN     Orders Placed This Encounter   Procedures    CBC Auto Differential     Standing Status:   Future     Standing Expiration Date:   5/22/2020    Comprehensive Metabolic Panel     Standing Status:   Future     Standing Expiration Date:   5/22/2020    Lipid Panel     Standing Status:   Future     Standing Expiration Date:   5/22/2020     Order Specific Question:   Is Patient Fasting?/# of Hours     Answer:   yes - 8 hours    Hemoglobin A1C     Standing Status:   Future     Standing Expiration Date:   5/22/2020    TSH WITH REFLEX TO FT4     Standing Status:   Future     Standing Expiration Date:   5/22/2020    Urinalysis Reflex to Culture     Standing Status:   Future     Standing Expiration Date:   5/23/2020     Order Specific Question:   SPECIFY(EX-CATH,MIDSTREAM,CYSTO,ETC)? Answer:   NA       PSH, PMH, SH and FH reviewed and noted. Recent and past labs, tests and consultsalso reviewed. Recent or new meds also reviewed.

## 2019-07-01 RX ORDER — FUROSEMIDE 40 MG/1
TABLET ORAL
Qty: 90 TABLET | Refills: 0 | Status: SHIPPED | OUTPATIENT
Start: 2019-07-01 | End: 2019-09-21 | Stop reason: SDUPTHER

## 2019-08-03 DIAGNOSIS — I10 ESSENTIAL HYPERTENSION, BENIGN: ICD-10-CM

## 2019-08-05 RX ORDER — COLESEVELAM 180 1/1
TABLET ORAL
Qty: 540 TABLET | Refills: 3 | Status: SHIPPED | OUTPATIENT
Start: 2019-08-05 | End: 2021-01-11

## 2019-09-11 ENCOUNTER — OFFICE VISIT (OUTPATIENT)
Dept: ORTHOPEDIC SURGERY | Age: 79
End: 2019-09-11
Payer: MEDICARE

## 2019-09-11 VITALS
SYSTOLIC BLOOD PRESSURE: 158 MMHG | TEMPERATURE: 98.4 F | HEART RATE: 73 BPM | DIASTOLIC BLOOD PRESSURE: 76 MMHG | WEIGHT: 237.4 LBS | BODY MASS INDEX: 35.98 KG/M2 | HEIGHT: 68 IN

## 2019-09-11 DIAGNOSIS — M17.12 PRIMARY OSTEOARTHRITIS OF LEFT KNEE: Primary | ICD-10-CM

## 2019-09-11 PROCEDURE — 20610 DRAIN/INJ JOINT/BURSA W/O US: CPT | Performed by: PHYSICIAN ASSISTANT

## 2019-09-11 NOTE — PROGRESS NOTES
(VITAMIN C) 1000 MG tablet Take 1,000 mg by mouth daily.  calcium-vitamin D 500 MG tablet Take 1 tablet by mouth 2 times daily.  Multiple Vitamin (MULTIVITAMIN PO) Take  by mouth. No current facility-administered medications for this visit. Objective:     She is alert, oriented x 3, pleasant, well nourished, developed and in no acute distress. BP (!) 158/76   Pulse 73   Temp 98.4 °F (36.9 °C) (Temporal)   Ht 5' 8\" (1.727 m)   Wt 237 lb 6.4 oz (107.7 kg)   BMI 36.10 kg/m²      KNEE EXAM:  Examination of the left knee shows: There is not erythema. ROM- decreased range of motion is noted. There is mild to moderate pain associated with ROM testing. Extensor Mechanism is  intact. X Rays: was not performed in the office today:       Assessment:       ICD-10-CM    1. Primary osteoarthritis of left knee M17.12 CT ARTHROCENTESIS ASPIR&/INJ MAJOR JT/BURSA W/O US     CT TRIAMCINOLONE ACETONIDE INJ        Plan:     The natural history of the patient's diagnosis as well as the treatment options were discussed in full and questions were answered. Risks and benefits of the treatment options also reviewed in detail. May increase blood sugars in diabetics. She understands that eventually knee arthroplasty will be the surgical correction needed to control the pain related to the arthritic knee condition. And when the injections fail to control the symptoms then surgical correction should be considered. Risk and benefits of corticosteroid intra-articular injection was discussed today. All questions were answered to her satisfaction. She verbally consented to proceed with intra-articular injection today.       Cortisone Injection                                                       PROCEDURE NOTE:     Pre op Diagnosis: Knee pain/ DJD left Knee     Post op Diagnosis: Same  With the patient's permission, her left knee was prepped  in standard sterile fashion with  Alcohol and 2 cc of

## 2019-09-23 RX ORDER — LOSARTAN POTASSIUM 100 MG/1
TABLET ORAL
Qty: 90 TABLET | Refills: 0 | Status: SHIPPED | OUTPATIENT
Start: 2019-09-23 | End: 2020-02-03

## 2019-09-23 RX ORDER — FUROSEMIDE 40 MG/1
TABLET ORAL
Qty: 90 TABLET | Refills: 0 | Status: SHIPPED | OUTPATIENT
Start: 2019-09-23 | End: 2020-04-30

## 2019-10-28 ENCOUNTER — TELEPHONE (OUTPATIENT)
Dept: INTERNAL MEDICINE CLINIC | Age: 79
End: 2019-10-28

## 2019-10-28 RX ORDER — CIPROFLOXACIN 500 MG/1
500 TABLET, FILM COATED ORAL 2 TIMES DAILY
Qty: 10 TABLET | Refills: 0 | Status: SHIPPED | OUTPATIENT
Start: 2019-10-28 | End: 2019-11-02

## 2019-11-18 DIAGNOSIS — I10 ESSENTIAL HYPERTENSION, BENIGN: ICD-10-CM

## 2019-11-18 DIAGNOSIS — E78.5 HYPERLIPIDEMIA, UNSPECIFIED HYPERLIPIDEMIA TYPE: ICD-10-CM

## 2019-11-18 DIAGNOSIS — R82.71 BACTERIURIA, ASYMPTOMATIC: ICD-10-CM

## 2019-11-18 DIAGNOSIS — I10 ESSENTIAL HYPERTENSION: ICD-10-CM

## 2019-11-18 DIAGNOSIS — Z00.00 PREVENTATIVE HEALTH CARE: ICD-10-CM

## 2019-11-18 LAB
A/G RATIO: 1.5 (ref 1.1–2.2)
ALBUMIN SERPL-MCNC: 4.1 G/DL (ref 3.4–5)
ALP BLD-CCNC: 76 U/L (ref 40–129)
ALT SERPL-CCNC: 16 U/L (ref 10–40)
ANION GAP SERPL CALCULATED.3IONS-SCNC: 15 MMOL/L (ref 3–16)
AST SERPL-CCNC: 16 U/L (ref 15–37)
BACTERIA: ABNORMAL /HPF
BASOPHILS ABSOLUTE: 0.1 K/UL (ref 0–0.2)
BASOPHILS RELATIVE PERCENT: 1.4 %
BILIRUB SERPL-MCNC: 2 MG/DL (ref 0–1)
BILIRUBIN URINE: NEGATIVE
BLOOD, URINE: NEGATIVE
BUN BLDV-MCNC: 15 MG/DL (ref 7–20)
CALCIUM SERPL-MCNC: 9.7 MG/DL (ref 8.3–10.6)
CHLORIDE BLD-SCNC: 101 MMOL/L (ref 99–110)
CHOLESTEROL, TOTAL: 214 MG/DL (ref 0–199)
CLARITY: ABNORMAL
CO2: 24 MMOL/L (ref 21–32)
COLOR: YELLOW
COMMENT UA: ABNORMAL
CREAT SERPL-MCNC: 0.6 MG/DL (ref 0.6–1.2)
EOSINOPHILS ABSOLUTE: 0.3 K/UL (ref 0–0.6)
EOSINOPHILS RELATIVE PERCENT: 7 %
EPITHELIAL CELLS, UA: 8 /HPF (ref 0–5)
GFR AFRICAN AMERICAN: >60
GFR NON-AFRICAN AMERICAN: >60
GLOBULIN: 2.7 G/DL
GLUCOSE BLD-MCNC: 97 MG/DL (ref 70–99)
GLUCOSE URINE: NEGATIVE MG/DL
HCT VFR BLD CALC: 41 % (ref 36–48)
HDLC SERPL-MCNC: 63 MG/DL (ref 40–60)
HEMOGLOBIN: 13.8 G/DL (ref 12–16)
HYALINE CASTS: 4 /LPF (ref 0–8)
KETONES, URINE: NEGATIVE MG/DL
LDL CHOLESTEROL CALCULATED: 125 MG/DL
LEUKOCYTE ESTERASE, URINE: ABNORMAL
LYMPHOCYTES ABSOLUTE: 1 K/UL (ref 1–5.1)
LYMPHOCYTES RELATIVE PERCENT: 24.4 %
MCH RBC QN AUTO: 31.3 PG (ref 26–34)
MCHC RBC AUTO-ENTMCNC: 33.7 G/DL (ref 31–36)
MCV RBC AUTO: 92.7 FL (ref 80–100)
MICROSCOPIC EXAMINATION: YES
MONOCYTES ABSOLUTE: 0.3 K/UL (ref 0–1.3)
MONOCYTES RELATIVE PERCENT: 8.2 %
NEUTROPHILS ABSOLUTE: 2.4 K/UL (ref 1.7–7.7)
NEUTROPHILS RELATIVE PERCENT: 59 %
NITRITE, URINE: NEGATIVE
PDW BLD-RTO: 13.9 % (ref 12.4–15.4)
PH UA: 6 (ref 5–8)
PLATELET # BLD: 220 K/UL (ref 135–450)
PMV BLD AUTO: 9.2 FL (ref 5–10.5)
POTASSIUM SERPL-SCNC: 4.5 MMOL/L (ref 3.5–5.1)
PROTEIN UA: NEGATIVE MG/DL
RBC # BLD: 4.42 M/UL (ref 4–5.2)
RBC UA: 3 /HPF (ref 0–4)
SODIUM BLD-SCNC: 140 MMOL/L (ref 136–145)
SPECIFIC GRAVITY UA: 1.02 (ref 1–1.03)
T4 FREE: 1.1 NG/DL (ref 0.9–1.8)
TOTAL PROTEIN: 6.8 G/DL (ref 6.4–8.2)
TRIGL SERPL-MCNC: 128 MG/DL (ref 0–150)
TSH REFLEX FT4: 4.38 UIU/ML (ref 0.27–4.2)
URINE REFLEX TO CULTURE: YES
URINE TYPE: ABNORMAL
UROBILINOGEN, URINE: 0.2 E.U./DL
VLDLC SERPL CALC-MCNC: 26 MG/DL
WBC # BLD: 4.1 K/UL (ref 4–11)
WBC UA: 13 /HPF (ref 0–5)

## 2019-11-19 LAB
ESTIMATED AVERAGE GLUCOSE: 99.7 MG/DL
HBA1C MFR BLD: 5.1 %
URINE CULTURE, ROUTINE: NORMAL

## 2019-11-25 ENCOUNTER — OFFICE VISIT (OUTPATIENT)
Dept: INTERNAL MEDICINE CLINIC | Age: 79
End: 2019-11-25
Payer: MEDICARE

## 2019-11-25 VITALS
HEART RATE: 76 BPM | SYSTOLIC BLOOD PRESSURE: 138 MMHG | BODY MASS INDEX: 39.49 KG/M2 | HEIGHT: 65 IN | WEIGHT: 237 LBS | DIASTOLIC BLOOD PRESSURE: 70 MMHG | OXYGEN SATURATION: 98 %

## 2019-11-25 DIAGNOSIS — E78.5 HYPERLIPIDEMIA, UNSPECIFIED HYPERLIPIDEMIA TYPE: ICD-10-CM

## 2019-11-25 DIAGNOSIS — R82.71 BACTERIURIA, ASYMPTOMATIC: Primary | ICD-10-CM

## 2019-11-25 DIAGNOSIS — I10 ESSENTIAL HYPERTENSION: ICD-10-CM

## 2019-11-25 DIAGNOSIS — R60.9 EDEMA, UNSPECIFIED TYPE: ICD-10-CM

## 2019-11-25 PROCEDURE — 1090F PRES/ABSN URINE INCON ASSESS: CPT | Performed by: INTERNAL MEDICINE

## 2019-11-25 PROCEDURE — 1123F ACP DISCUSS/DSCN MKR DOCD: CPT | Performed by: INTERNAL MEDICINE

## 2019-11-25 PROCEDURE — G8427 DOCREV CUR MEDS BY ELIG CLIN: HCPCS | Performed by: INTERNAL MEDICINE

## 2019-11-25 PROCEDURE — G8417 CALC BMI ABV UP PARAM F/U: HCPCS | Performed by: INTERNAL MEDICINE

## 2019-11-25 PROCEDURE — 1036F TOBACCO NON-USER: CPT | Performed by: INTERNAL MEDICINE

## 2019-11-25 PROCEDURE — 99214 OFFICE O/P EST MOD 30 MIN: CPT | Performed by: INTERNAL MEDICINE

## 2019-11-25 PROCEDURE — G8482 FLU IMMUNIZE ORDER/ADMIN: HCPCS | Performed by: INTERNAL MEDICINE

## 2019-11-25 PROCEDURE — G8399 PT W/DXA RESULTS DOCUMENT: HCPCS | Performed by: INTERNAL MEDICINE

## 2019-11-25 PROCEDURE — 4040F PNEUMOC VAC/ADMIN/RCVD: CPT | Performed by: INTERNAL MEDICINE

## 2019-11-25 ASSESSMENT — ENCOUNTER SYMPTOMS: SHORTNESS OF BREATH: 0

## 2020-01-14 ENCOUNTER — OFFICE VISIT (OUTPATIENT)
Dept: ORTHOPEDIC SURGERY | Age: 80
End: 2020-01-14
Payer: MEDICARE

## 2020-01-14 VITALS
HEART RATE: 74 BPM | WEIGHT: 237 LBS | DIASTOLIC BLOOD PRESSURE: 74 MMHG | HEIGHT: 68 IN | SYSTOLIC BLOOD PRESSURE: 164 MMHG | BODY MASS INDEX: 35.92 KG/M2 | TEMPERATURE: 98.1 F

## 2020-01-14 PROCEDURE — 20610 DRAIN/INJ JOINT/BURSA W/O US: CPT | Performed by: PHYSICIAN ASSISTANT

## 2020-01-14 NOTE — PROGRESS NOTES
0.25% Marcaine and 1 cc of Kenalog 40 mg was injected into the left lateral compartment  without difficulty. The patient tolerated this well without difficulty. A band-aid was applied. The patient was advised to ice the knee for 15-20 minutes to relieve any injection site related pain. Continue with medications as previously prescribed. If diabetic, observe blood sugars for the next 24 to 48 hours. Contact PCP if they remain elevated. HEP instructed for Knee ROM  and Quad strengthening exercises. Activities as tolerated. Follow Up:   Call or return to clinic prn if these symptoms worsen or fail to improve as anticipated.

## 2020-02-03 ENCOUNTER — TELEPHONE (OUTPATIENT)
Dept: INTERNAL MEDICINE CLINIC | Age: 80
End: 2020-02-03

## 2020-02-03 RX ORDER — AMOXICILLIN AND CLAVULANATE POTASSIUM 875; 125 MG/1; MG/1
TABLET, FILM COATED ORAL
Qty: 14 TABLET | Refills: 0 | Status: SHIPPED | OUTPATIENT
Start: 2020-02-03 | End: 2020-05-26 | Stop reason: ALTCHOICE

## 2020-02-03 NOTE — TELEPHONE ENCOUNTER
Pt calling states she has had cold for 11 days she taken over counter drugs nothing working. Headache, sneezing coughing, mucus, having trouble sleeping. Patient needs something or needs to know if she needs to come in to office.      Please advise

## 2020-02-25 ENCOUNTER — OFFICE VISIT (OUTPATIENT)
Dept: INTERNAL MEDICINE CLINIC | Age: 80
End: 2020-02-25
Payer: MEDICARE

## 2020-02-25 VITALS
WEIGHT: 233 LBS | SYSTOLIC BLOOD PRESSURE: 148 MMHG | HEIGHT: 68 IN | BODY MASS INDEX: 35.31 KG/M2 | DIASTOLIC BLOOD PRESSURE: 70 MMHG | TEMPERATURE: 98 F

## 2020-02-25 PROBLEM — H69.83 EUSTACHIAN TUBE DYSFUNCTION, BILATERAL: Status: ACTIVE | Noted: 2020-02-25

## 2020-02-25 PROBLEM — H69.93 EUSTACHIAN TUBE DYSFUNCTION, BILATERAL: Status: ACTIVE | Noted: 2020-02-25

## 2020-02-25 PROCEDURE — G8427 DOCREV CUR MEDS BY ELIG CLIN: HCPCS | Performed by: INTERNAL MEDICINE

## 2020-02-25 PROCEDURE — 99213 OFFICE O/P EST LOW 20 MIN: CPT | Performed by: INTERNAL MEDICINE

## 2020-02-25 PROCEDURE — G8482 FLU IMMUNIZE ORDER/ADMIN: HCPCS | Performed by: INTERNAL MEDICINE

## 2020-02-25 PROCEDURE — 1123F ACP DISCUSS/DSCN MKR DOCD: CPT | Performed by: INTERNAL MEDICINE

## 2020-02-25 PROCEDURE — G8417 CALC BMI ABV UP PARAM F/U: HCPCS | Performed by: INTERNAL MEDICINE

## 2020-02-25 PROCEDURE — G8399 PT W/DXA RESULTS DOCUMENT: HCPCS | Performed by: INTERNAL MEDICINE

## 2020-02-25 PROCEDURE — 1036F TOBACCO NON-USER: CPT | Performed by: INTERNAL MEDICINE

## 2020-02-25 PROCEDURE — 4040F PNEUMOC VAC/ADMIN/RCVD: CPT | Performed by: INTERNAL MEDICINE

## 2020-02-25 PROCEDURE — 1090F PRES/ABSN URINE INCON ASSESS: CPT | Performed by: INTERNAL MEDICINE

## 2020-02-25 RX ORDER — PREDNISONE 10 MG/1
TABLET ORAL
Qty: 30 TABLET | Refills: 0 | Status: SHIPPED | OUTPATIENT
Start: 2020-02-25 | End: 2020-03-06

## 2020-02-25 ASSESSMENT — ENCOUNTER SYMPTOMS
COUGH: 1
SORE THROAT: 0
SINUS PAIN: 0
SINUS PRESSURE: 0
RHINORRHEA: 0

## 2020-02-25 ASSESSMENT — PATIENT HEALTH QUESTIONNAIRE - PHQ9
SUM OF ALL RESPONSES TO PHQ QUESTIONS 1-9: 0
SUM OF ALL RESPONSES TO PHQ9 QUESTIONS 1 & 2: 0
SUM OF ALL RESPONSES TO PHQ QUESTIONS 1-9: 0
2. FEELING DOWN, DEPRESSED OR HOPELESS: 0
1. LITTLE INTEREST OR PLEASURE IN DOING THINGS: 0

## 2020-02-25 NOTE — ASSESSMENT & PLAN NOTE
Noted over the last month since her upper respiratory infection. Decreased hearing acuity and crackling in both ears. Will try Flonase and a course of prednisone. If no better refer to ENT.

## 2020-02-25 NOTE — PROGRESS NOTES
SUBJECTIVE:  Patient ID: Kelly Mancuso is an 70 y.o. female. HPI: Patient here today for the f/u of chronic problems-- see Problem List and associated comments. New issues or complaints include (alsosee Assessment for more details): Patient had an upper respiratory infection 1 month ago and was treated with antibiotics. Since that time she is noticed decreased hearing acuity in both ears and a crackling sensation in her ears. She has had to turn the TV up to hear it. She denies any ear pain. She is breathing clearly through her nose and sinuses. Occasional clear drainage. No fever or chills or other signs of upper respiratory infection at the current time. She still has a bit of a cough left over from the respiratory infection. Review of Systems   Constitutional: Negative for fever. HENT: Positive for hearing loss. Negative for congestion, ear discharge, ear pain, postnasal drip, rhinorrhea, sinus pressure, sinus pain and sore throat. Respiratory: Positive for cough. OBJECTIVE:    BP (!) 148/70 (Site: Left Upper Arm, Position: Sitting, Cuff Size: Large Adult)   Temp 98 °F (36.7 °C) (Oral)   Ht 5' 8\" (1.727 m)   Wt 233 lb (105.7 kg)   BMI 35.43 kg/m²      Physical Exam  Constitutional:       Appearance: She is not ill-appearing. HENT:      Right Ear: Tympanic membrane, ear canal and external ear normal. There is no impacted cerumen. Left Ear: Tympanic membrane, ear canal and external ear normal. There is no impacted cerumen. Nose: Nose normal. No congestion or rhinorrhea. Mouth/Throat:      Pharynx: No oropharyngeal exudate or posterior oropharyngeal erythema. Eyes:      General:         Right eye: No discharge. Left eye: No discharge. Cardiovascular:      Rate and Rhythm: Normal rate. Pulmonary:      Effort: Pulmonary effort is normal. No respiratory distress. Breath sounds: Normal breath sounds. No stridor. No wheezing, rhonchi or rales.

## 2020-03-16 ENCOUNTER — TELEPHONE (OUTPATIENT)
Dept: INTERNAL MEDICINE CLINIC | Age: 80
End: 2020-03-16

## 2020-03-16 RX ORDER — CARVEDILOL 25 MG/1
TABLET ORAL
Qty: 180 TABLET | Refills: 1 | Status: SHIPPED | OUTPATIENT
Start: 2020-03-16 | End: 2020-03-17

## 2020-03-16 RX ORDER — CARVEDILOL 25 MG/1
25 TABLET ORAL 2 TIMES DAILY
Qty: 60 TABLET | Refills: 0 | Status: SHIPPED | OUTPATIENT
Start: 2020-03-16 | End: 2020-03-17

## 2020-03-16 NOTE — TELEPHONE ENCOUNTER
Pt requesting a refill on carvedilol (COREG) 25 MG tablet. Pt states she needs something called gap her until she gets her mail order.  Pt states she needs the ok [facility]

## 2020-03-16 NOTE — TELEPHONE ENCOUNTER
Carvedilol 25 mg #60        1 twice daily       Use this for 1 month and then get the following sent in for her 90-day prescription:  Carvedilol 25 mg #180          1 twice daily        refill 3

## 2020-03-17 RX ORDER — CARVEDILOL 25 MG/1
TABLET ORAL
Qty: 180 TABLET | Refills: 3 | Status: SHIPPED | OUTPATIENT
Start: 2020-03-17 | End: 2021-06-11 | Stop reason: SDUPTHER

## 2020-05-19 ENCOUNTER — OFFICE VISIT (OUTPATIENT)
Dept: ORTHOPEDIC SURGERY | Age: 80
End: 2020-05-19
Payer: MEDICARE

## 2020-05-19 VITALS — TEMPERATURE: 96.8 F

## 2020-05-19 PROCEDURE — 20610 DRAIN/INJ JOINT/BURSA W/O US: CPT | Performed by: PHYSICIAN ASSISTANT

## 2020-05-19 PROCEDURE — 1123F ACP DISCUSS/DSCN MKR DOCD: CPT | Performed by: PHYSICIAN ASSISTANT

## 2020-05-19 PROCEDURE — 4040F PNEUMOC VAC/ADMIN/RCVD: CPT | Performed by: PHYSICIAN ASSISTANT

## 2020-05-19 PROCEDURE — 1036F TOBACCO NON-USER: CPT | Performed by: PHYSICIAN ASSISTANT

## 2020-05-19 PROCEDURE — 1090F PRES/ABSN URINE INCON ASSESS: CPT | Performed by: PHYSICIAN ASSISTANT

## 2020-05-19 PROCEDURE — G8399 PT W/DXA RESULTS DOCUMENT: HCPCS | Performed by: PHYSICIAN ASSISTANT

## 2020-05-19 PROCEDURE — G8427 DOCREV CUR MEDS BY ELIG CLIN: HCPCS | Performed by: PHYSICIAN ASSISTANT

## 2020-05-19 PROCEDURE — 99214 OFFICE O/P EST MOD 30 MIN: CPT | Performed by: PHYSICIAN ASSISTANT

## 2020-05-19 PROCEDURE — G8417 CALC BMI ABV UP PARAM F/U: HCPCS | Performed by: PHYSICIAN ASSISTANT

## 2020-05-19 NOTE — PROGRESS NOTES
extremities are intact with sensation to light touch. Motor testing  5/5 in all major motor groups of the lower extremities. Gait is normal heel to toe. Gait is antalgic. Negative Bourgeois's Sign. SLR negative. VASCULAR EXAM:  Examination of the lower extremities shows intact perfusion to all extremities. No cyanosis. Digits are warm to touch, capillary refill is less than 2 seconds. mild edema noted. SKIN:  Examination of the skin over both lower extremities reveals: The skin to be intact without lacerations or abrasions. No significant erythema. No rashes or skin lesions. X Rays: was performed in the office today:   AP Standing, Lateral and Sunrise view of left knee: There is marked osteoarthritic findings of the left knee noted grade 3-4 arthritic changes of all 3 compartments. .   No acute fractures or dislocations noted. AP Standing, Lateral and Sunrise  Right Knee: There is a right prosthetic total knee arthroplasty present. The alignment is satisfactory. There are no signs of fracture, failure or loosening. Assessment:       ICD-10-CM    1. Primary osteoarthritis of left knee M17.12 XR KNEE LEFT (1-2 VIEWS)     XR Knee Bilateral Standing     44706 - CT DRAIN/INJECT LARGE JOINT/BURSA     CT TRIAMCINOLONE ACETONIDE INJ   2. Status post total right knee replacement Z96.651 XR KNEE RIGHT (1-2 VIEWS)     XR Knee Bilateral Standing        Plan:     Advanced arthritis of the left knee. Clinically and radiographically stable right knee arthroplasty. The natural history of the patient's diagnosis as well as the treatment options were discussed in full and questions were answered. Risks and benefits of the treatment options also reviewed in detail. May increase blood sugars in diabetics. She understands that eventually knee arthroplasty will be the surgical correction needed to control the pain related to the arthritic knee condition.  And when the injections fail to control the symptoms then surgical correction should be considered. Risk and benefits of corticosteroid intra-articular injection was discussed today. All questions were answered to her satisfaction. She verbally consented to proceed with intra-articular injection today. Cortisone Injection                                                       PROCEDURE NOTE:     Pre op Diagnosis: Knee pain/ DJD left knee     Post op Diagnosis: Same  With the patient's permission, her left knee was prepped  in standard sterile fashion with  Alcohol and 2 cc of 0.25% Marcaine and 1 cc of Kenalog 40 mg was injected into the left lateral compartment  without difficulty. The patient tolerated this well without difficulty. A band-aid was applied. The patient was advised to ice the knee for 15-20 minutes to relieve any injection site related pain. Continue with medications as previously prescribed. If diabetic, observe blood sugars for the next 24 to 48 hours. Contact PCP if they remain elevated. HEP instructed for Knee ROM  and Quad strengthening exercises. Activities as tolerated. Follow Up:   Call or return to clinic prn if these symptoms worsen or fail to improve as anticipated.

## 2020-05-20 ENCOUNTER — TELEPHONE (OUTPATIENT)
Dept: INTERNAL MEDICINE CLINIC | Age: 80
End: 2020-05-20

## 2020-05-20 DIAGNOSIS — E78.5 HYPERLIPIDEMIA, UNSPECIFIED HYPERLIPIDEMIA TYPE: ICD-10-CM

## 2020-05-20 DIAGNOSIS — I10 ESSENTIAL HYPERTENSION: ICD-10-CM

## 2020-05-20 DIAGNOSIS — R82.71 BACTERIURIA, ASYMPTOMATIC: ICD-10-CM

## 2020-05-20 LAB
A/G RATIO: 1.7 (ref 1.1–2.2)
ALBUMIN SERPL-MCNC: 4.3 G/DL (ref 3.4–5)
ALP BLD-CCNC: 92 U/L (ref 40–129)
ALT SERPL-CCNC: 11 U/L (ref 10–40)
ANION GAP SERPL CALCULATED.3IONS-SCNC: 14 MMOL/L (ref 3–16)
AST SERPL-CCNC: 12 U/L (ref 15–37)
BASOPHILS ABSOLUTE: 0 K/UL (ref 0–0.2)
BASOPHILS RELATIVE PERCENT: 0.2 %
BILIRUB SERPL-MCNC: 1.4 MG/DL (ref 0–1)
BILIRUBIN URINE: NEGATIVE
BLOOD, URINE: NEGATIVE
BUN BLDV-MCNC: 13 MG/DL (ref 7–20)
CALCIUM SERPL-MCNC: 10.1 MG/DL (ref 8.3–10.6)
CHLORIDE BLD-SCNC: 103 MMOL/L (ref 99–110)
CHOLESTEROL, TOTAL: 207 MG/DL (ref 0–199)
CLARITY: CLEAR
CO2: 23 MMOL/L (ref 21–32)
COLOR: YELLOW
CREAT SERPL-MCNC: 0.6 MG/DL (ref 0.6–1.2)
EOSINOPHILS ABSOLUTE: 0 K/UL (ref 0–0.6)
EOSINOPHILS RELATIVE PERCENT: 0.1 %
GFR AFRICAN AMERICAN: >60
GFR NON-AFRICAN AMERICAN: >60
GLOBULIN: 2.6 G/DL
GLUCOSE BLD-MCNC: 110 MG/DL (ref 70–99)
GLUCOSE URINE: NEGATIVE MG/DL
HCT VFR BLD CALC: 42.9 % (ref 36–48)
HDLC SERPL-MCNC: 64 MG/DL (ref 40–60)
HEMOGLOBIN: 14.5 G/DL (ref 12–16)
KETONES, URINE: NEGATIVE MG/DL
LDL CHOLESTEROL CALCULATED: 122 MG/DL
LEUKOCYTE ESTERASE, URINE: NEGATIVE
LYMPHOCYTES ABSOLUTE: 1 K/UL (ref 1–5.1)
LYMPHOCYTES RELATIVE PERCENT: 12.6 %
MCH RBC QN AUTO: 31.2 PG (ref 26–34)
MCHC RBC AUTO-ENTMCNC: 33.8 G/DL (ref 31–36)
MCV RBC AUTO: 92.2 FL (ref 80–100)
MICROSCOPIC EXAMINATION: NORMAL
MONOCYTES ABSOLUTE: 0.3 K/UL (ref 0–1.3)
MONOCYTES RELATIVE PERCENT: 4.2 %
NEUTROPHILS ABSOLUTE: 6.4 K/UL (ref 1.7–7.7)
NEUTROPHILS RELATIVE PERCENT: 82.9 %
NITRITE, URINE: NEGATIVE
PDW BLD-RTO: 13.9 % (ref 12.4–15.4)
PH UA: 6 (ref 5–8)
PLATELET # BLD: 221 K/UL (ref 135–450)
PMV BLD AUTO: 9.5 FL (ref 5–10.5)
POTASSIUM SERPL-SCNC: 4.3 MMOL/L (ref 3.5–5.1)
PROTEIN UA: NEGATIVE MG/DL
RBC # BLD: 4.65 M/UL (ref 4–5.2)
SODIUM BLD-SCNC: 140 MMOL/L (ref 136–145)
SPECIFIC GRAVITY UA: 1.01 (ref 1–1.03)
TOTAL PROTEIN: 6.9 G/DL (ref 6.4–8.2)
TRIGL SERPL-MCNC: 103 MG/DL (ref 0–150)
URINE REFLEX TO CULTURE: NORMAL
URINE TYPE: NORMAL
UROBILINOGEN, URINE: 0.2 E.U./DL
VLDLC SERPL CALC-MCNC: 21 MG/DL
WBC # BLD: 7.7 K/UL (ref 4–11)

## 2020-05-20 NOTE — TELEPHONE ENCOUNTER
Spoke to pt and offered video or telephone visit they have no way of doing it no cell phone and no camera on computer.  They stated they are ok to come in

## 2020-05-26 ENCOUNTER — OFFICE VISIT (OUTPATIENT)
Dept: INTERNAL MEDICINE CLINIC | Age: 80
End: 2020-05-26
Payer: MEDICARE

## 2020-05-26 VITALS
WEIGHT: 228 LBS | SYSTOLIC BLOOD PRESSURE: 130 MMHG | HEIGHT: 68 IN | DIASTOLIC BLOOD PRESSURE: 76 MMHG | BODY MASS INDEX: 34.56 KG/M2 | TEMPERATURE: 98.2 F

## 2020-05-26 PROCEDURE — G8427 DOCREV CUR MEDS BY ELIG CLIN: HCPCS | Performed by: INTERNAL MEDICINE

## 2020-05-26 PROCEDURE — 1123F ACP DISCUSS/DSCN MKR DOCD: CPT | Performed by: INTERNAL MEDICINE

## 2020-05-26 PROCEDURE — 1036F TOBACCO NON-USER: CPT | Performed by: INTERNAL MEDICINE

## 2020-05-26 PROCEDURE — 4040F PNEUMOC VAC/ADMIN/RCVD: CPT | Performed by: INTERNAL MEDICINE

## 2020-05-26 PROCEDURE — G8417 CALC BMI ABV UP PARAM F/U: HCPCS | Performed by: INTERNAL MEDICINE

## 2020-05-26 PROCEDURE — G8399 PT W/DXA RESULTS DOCUMENT: HCPCS | Performed by: INTERNAL MEDICINE

## 2020-05-26 PROCEDURE — 1090F PRES/ABSN URINE INCON ASSESS: CPT | Performed by: INTERNAL MEDICINE

## 2020-05-26 PROCEDURE — 99214 OFFICE O/P EST MOD 30 MIN: CPT | Performed by: INTERNAL MEDICINE

## 2020-05-26 ASSESSMENT — ENCOUNTER SYMPTOMS
GASTROINTESTINAL NEGATIVE: 1
COUGH: 0
RESPIRATORY NEGATIVE: 1

## 2020-05-26 NOTE — PROGRESS NOTES
SUBJECTIVE:  Patient ID: Morales Luna is an 68 y.o. female. HPI: Patient here today for the f/u of chronic problems-- see Problem List and associated comments. New issues or complaints include (also see Assessment for more details): Here for her regular 6-month checkup. She has been sheltering in place during the [place] pandemic. She did have respiratory illness approximately 3 to 4 months ago that took quite a while to recover from. She was left with residual cough for a few weeks. This is all cleared and she is asymptomatic at this time. She is compliant with her medications. She denies any cardiovascular or pulmonary issues at this time. She does have some ophthalmologic issues-she is seen ophthalmology regarding her contacts and a secondary cataract. She has lost a few pounds during the pandemic. Review of Systems   Constitutional: Negative for fatigue and fever. Eyes: Positive for visual disturbance (Secondary cataract). Respiratory: Negative. Negative for cough. Cardiovascular: Negative. Gastrointestinal: Negative. Genitourinary: Negative for difficulty urinating and dysuria. Musculoskeletal: Positive for arthralgias. Allergic/Immunologic: Negative for immunocompromised state. Neurological: Negative. Psychiatric/Behavioral: Negative. OBJECTIVE:    /76 (Site: Right Upper Arm)   Temp 98.2 °F (36.8 °C)   Ht 5' 8\" (1.727 m)   Wt 228 lb (103.4 kg)   BMI 34.67 kg/m²      Physical Exam  Constitutional:       General: She is not in acute distress. Appearance: Normal appearance. She is well-developed. She is not diaphoretic. Comments: overweight    Eyes:      General: No scleral icterus. Neck:      Vascular: No carotid bruit or JVD. Cardiovascular:      Rate and Rhythm: Normal rate and regular rhythm. Heart sounds: Normal heart sounds. Pulmonary:      Effort: Pulmonary effort is normal. No respiratory distress.       Breath sounds: Normal breath sounds. No stridor. Abdominal:      General: There is no abdominal bruit. Musculoskeletal:      Right lower leg: No edema. Left lower leg: No edema. Skin:     Coloration: Skin is not pale. Neurological:      Mental Status: She is alert and oriented to person, place, and time. Cranial Nerves: No cranial nerve deficit. Psychiatric:         Behavior: Behavior normal.         Thought Content: Thought content normal.         Judgment: Judgment normal.         ASSESSMENT:       Encounter Diagnoses   Name Primary?  Bacteriuria, asymptomatic Yes    Hyperlipidemia, unspecified hyperlipidemia type     Essential hypertension     Hypercalcemia        Hypercalcemia  Calcium level okay    Hyperlipidemia  Lipids okay. Statin intolerance. Continue WelChol. Hypertension  BP okay        PLAN:See ASSESSMENT for evaluation & PLAN     Orders Placed This Encounter   Procedures    CBC Auto Differential     Standing Status:   Future     Standing Expiration Date:   5/26/2021    Comprehensive Metabolic Panel     Standing Status:   Future     Standing Expiration Date:   5/26/2021    Lipid Panel     Standing Status:   Future     Standing Expiration Date:   5/26/2021     Order Specific Question:   Is Patient Fasting?/# of Hours     Answer:   yes - 8 hours    TSH WITH REFLEX TO FT4     Standing Status:   Future     Standing Expiration Date:   5/26/2021    Urinalysis Reflex to Culture     Standing Status:   Future     Standing Expiration Date:   5/26/2021     Order Specific Question:   SPECIFY(EX-CATH,MIDSTREAM,CYSTO,ETC)? Answer:   NA     , PMH, SH and FH reviewed and noted. Recent and past labs, tests and consultsalso reviewed. Recent or new meds also reviewed.

## 2020-09-22 ENCOUNTER — OFFICE VISIT (OUTPATIENT)
Dept: ORTHOPEDIC SURGERY | Age: 80
End: 2020-09-22
Payer: MEDICARE

## 2020-09-22 VITALS — TEMPERATURE: 97.2 F

## 2020-09-22 PROCEDURE — 20610 DRAIN/INJ JOINT/BURSA W/O US: CPT | Performed by: PHYSICIAN ASSISTANT

## 2020-09-22 NOTE — PROGRESS NOTES
Subjective:      Patient ID: Deonna Seo is a 68 y.o.  female. Chief Complaint   Patient presents with    Knee Problem     Left knee        HPI: She is here for evaluation and treatment of left knee pain related to arthritis. She states the previous injection which was performed 5/19/2020 helped relieve the knee symptoms. The knee pain has gradually returned. There has not been a recent injury. Pain is 6/10. Pain is worse with activity. Pain improves somewhat with rest and elevation. Review of Systems:   Negative for fever or chills. Negative for numbness or tingling around the knee. Past Medical History:   Diagnosis Date    Arthritis     Cataract     High blood pressure     Hyperlipidemia     stress test 2007    normal       Family History   Problem Relation Age of Onset    Cancer Other     High Blood Pressure Other        Past Surgical History:   Procedure Laterality Date    EYE SURGERY  1985    L eye - injury    JOINT REPLACEMENT      R TKR - Dr Jose Camera Not on file   Tobacco Use    Smoking status: Never Smoker    Smokeless tobacco: Never Used   Substance and Sexual Activity    Alcohol use: No     Alcohol/week: 0.0 standard drinks    Drug use: No    Sexual activity: Not on file       Current Outpatient Medications   Medication Sig Dispense Refill    furosemide (LASIX) 40 MG tablet TAKE 1 TABLET BY MOUTH  DAILY 90 tablet 2    spironolactone (ALDACTONE) 25 MG tablet TAKE 1 TABLET BY MOUTH  DAILY 90 tablet 2    carvedilol (COREG) 25 MG tablet TAKE 1 TABLET BY MOUTH TWO  TIMES DAILY 180 tablet 3    losartan (COZAAR) 100 MG tablet TAKE 1 TABLET BY MOUTH  DAILY 90 tablet 3    colesevelam (WELCHOL) 625 MG tablet TAKE 3 TABLETS BY MOUTH TWO TIMES DAILY WITH MEALS 540 tablet 3    traMADol (ULTRAM) 50 MG tablet Take 1 tablet by mouth every 8 hours as needed for Pain 270 tablet 1    aspirin 81 MG tablet Take 81 mg by mouth daily.       Ascorbic Acid (VITAMIN C) 1000 MG tablet Take 1,000 mg by mouth daily.  calcium-vitamin D 500 MG tablet Take 1 tablet by mouth 2 times daily.  Multiple Vitamin (MULTIVITAMIN PO) Take  by mouth. No current facility-administered medications for this visit. Objective:     She is alert, oriented x 3, pleasant, well nourished, developed and in no acute distress. Temp 97.2 °F (36.2 °C) (Temporal)      KNEE EXAM:  Examination of the left knee shows: There is not erythema. ROM- decreased range of motion is noted. There is mild to moderate pain associated with ROM testing. Extensor Mechanism is  intact. X Rays: was not performed in the office today:       Assessment:       ICD-10-CM    1. Primary osteoarthritis of left knee  M17.12 [place] ARTHROCENTESIS ASPIR&/INJ MAJOR JT/BURSA W/O US     AR TRIAMCINOLONE ACETONIDE INJ        Plan:     The natural history of the patient's diagnosis as well as the treatment options were discussed in full and questions were answered. Risks and benefits of the treatment options also reviewed in detail. May increase blood sugars in diabetics. She understands that eventually knee arthroplasty will be the surgical correction needed to control the pain related to the arthritic knee condition. And when the injections fail to control the symptoms then surgical correction should be considered. Risk and benefits of corticosteroid intra-articular injection was discussed today. All questions were answered to her satisfaction. She verbally consented to proceed with intra-articular injection today.       Cortisone Injection                                                       PROCEDURE NOTE:     Pre op Diagnosis: Knee pain/ DJD left Knee     Post op Diagnosis: Same  With the patient's permission, her left knee was prepped  in standard sterile fashion with  Alcohol and 2 cc of 0.25% Marcaine and 1 cc of Kenalog 40 mg was injected into the left lateral compartment  without difficulty. The patient tolerated this well without difficulty. A band-aid was applied. The patient was advised to ice the knee for 15-20 minutes to relieve any injection site related pain. Continue with medications as previously prescribed. If diabetic, observe blood sugars for the next 24 to 48 hours. Contact PCP if they remain elevated. HEP instructed for Knee ROM  and Quad strengthening exercises. Activities as tolerated. Follow Up:   Call or return to clinic prn if these symptoms worsen or fail to improve as anticipated.

## 2020-11-09 DIAGNOSIS — E78.5 HYPERLIPIDEMIA, UNSPECIFIED HYPERLIPIDEMIA TYPE: ICD-10-CM

## 2020-11-09 DIAGNOSIS — I10 ESSENTIAL HYPERTENSION: ICD-10-CM

## 2020-11-09 DIAGNOSIS — R82.71 BACTERIURIA, ASYMPTOMATIC: ICD-10-CM

## 2020-11-09 LAB
A/G RATIO: 1.5 (ref 1.1–2.2)
ALBUMIN SERPL-MCNC: 3.9 G/DL (ref 3.4–5)
ALP BLD-CCNC: 79 U/L (ref 40–129)
ALT SERPL-CCNC: 10 U/L (ref 10–40)
ANION GAP SERPL CALCULATED.3IONS-SCNC: 12 MMOL/L (ref 3–16)
AST SERPL-CCNC: 11 U/L (ref 15–37)
BACTERIA: ABNORMAL /HPF
BASOPHILS ABSOLUTE: 0.1 K/UL (ref 0–0.2)
BASOPHILS RELATIVE PERCENT: 1.3 %
BILIRUB SERPL-MCNC: 1.8 MG/DL (ref 0–1)
BILIRUBIN URINE: NEGATIVE
BLOOD, URINE: NEGATIVE
BUN BLDV-MCNC: 16 MG/DL (ref 7–20)
CALCIUM SERPL-MCNC: 9.5 MG/DL (ref 8.3–10.6)
CHLORIDE BLD-SCNC: 102 MMOL/L (ref 99–110)
CHOLESTEROL, TOTAL: 217 MG/DL (ref 0–199)
CLARITY: ABNORMAL
CO2: 24 MMOL/L (ref 21–32)
COLOR: YELLOW
CREAT SERPL-MCNC: 0.7 MG/DL (ref 0.6–1.2)
EOSINOPHILS ABSOLUTE: 0.1 K/UL (ref 0–0.6)
EOSINOPHILS RELATIVE PERCENT: 3.3 %
EPITHELIAL CELLS, UA: 5 /HPF (ref 0–5)
GFR AFRICAN AMERICAN: >60
GFR NON-AFRICAN AMERICAN: >60
GLOBULIN: 2.6 G/DL
GLUCOSE BLD-MCNC: 107 MG/DL (ref 70–99)
GLUCOSE URINE: NEGATIVE MG/DL
HCT VFR BLD CALC: 42.7 % (ref 36–48)
HDLC SERPL-MCNC: 62 MG/DL (ref 40–60)
HEMOGLOBIN: 14.6 G/DL (ref 12–16)
HYALINE CASTS: 2 /LPF (ref 0–8)
KETONES, URINE: NEGATIVE MG/DL
LDL CHOLESTEROL CALCULATED: 132 MG/DL
LEUKOCYTE ESTERASE, URINE: ABNORMAL
LYMPHOCYTES ABSOLUTE: 1 K/UL (ref 1–5.1)
LYMPHOCYTES RELATIVE PERCENT: 25.1 %
MCH RBC QN AUTO: 31.3 PG (ref 26–34)
MCHC RBC AUTO-ENTMCNC: 34.1 G/DL (ref 31–36)
MCV RBC AUTO: 91.7 FL (ref 80–100)
MICROSCOPIC EXAMINATION: YES
MONOCYTES ABSOLUTE: 0.3 K/UL (ref 0–1.3)
MONOCYTES RELATIVE PERCENT: 6.6 %
MUCUS: ABNORMAL /LPF
NEUTROPHILS ABSOLUTE: 2.7 K/UL (ref 1.7–7.7)
NEUTROPHILS RELATIVE PERCENT: 63.7 %
NITRITE, URINE: NEGATIVE
PDW BLD-RTO: 14.3 % (ref 12.4–15.4)
PH UA: 6.5 (ref 5–8)
PLATELET # BLD: 197 K/UL (ref 135–450)
PMV BLD AUTO: 9 FL (ref 5–10.5)
POTASSIUM SERPL-SCNC: 4.3 MMOL/L (ref 3.5–5.1)
PROTEIN UA: NEGATIVE MG/DL
RBC # BLD: 4.66 M/UL (ref 4–5.2)
RBC UA: ABNORMAL /HPF (ref 0–4)
RENAL EPITHELIAL, UA: ABNORMAL /HPF (ref 0–1)
SODIUM BLD-SCNC: 138 MMOL/L (ref 136–145)
SPECIFIC GRAVITY UA: 1.02 (ref 1–1.03)
TOTAL PROTEIN: 6.5 G/DL (ref 6.4–8.2)
TRIGL SERPL-MCNC: 117 MG/DL (ref 0–150)
TSH REFLEX FT4: 3.8 UIU/ML (ref 0.27–4.2)
URINE REFLEX TO CULTURE: ABNORMAL
URINE TYPE: ABNORMAL
UROBILINOGEN, URINE: 1 E.U./DL
VLDLC SERPL CALC-MCNC: 23 MG/DL
WBC # BLD: 4.2 K/UL (ref 4–11)
WBC UA: 3 /HPF (ref 0–5)

## 2020-11-10 LAB — URINE CULTURE, ROUTINE: NORMAL

## 2020-11-10 RX ORDER — CIPROFLOXACIN 500 MG/1
500 TABLET, FILM COATED ORAL 2 TIMES DAILY
Qty: 14 TABLET | Refills: 0 | Status: SHIPPED | OUTPATIENT
Start: 2020-11-10 | End: 2020-11-16 | Stop reason: ALTCHOICE

## 2020-11-16 ENCOUNTER — OFFICE VISIT (OUTPATIENT)
Dept: INTERNAL MEDICINE CLINIC | Age: 80
End: 2020-11-16
Payer: MEDICARE

## 2020-11-16 VITALS
WEIGHT: 231.6 LBS | SYSTOLIC BLOOD PRESSURE: 138 MMHG | HEIGHT: 68 IN | TEMPERATURE: 97.2 F | DIASTOLIC BLOOD PRESSURE: 72 MMHG | BODY MASS INDEX: 35.1 KG/M2

## 2020-11-16 PROBLEM — H69.83 EUSTACHIAN TUBE DYSFUNCTION, BILATERAL: Status: RESOLVED | Noted: 2020-02-25 | Resolved: 2020-11-16

## 2020-11-16 PROBLEM — Z00.00 PREVENTATIVE HEALTH CARE: Status: ACTIVE | Noted: 2020-11-16

## 2020-11-16 PROBLEM — H69.93 EUSTACHIAN TUBE DYSFUNCTION, BILATERAL: Status: RESOLVED | Noted: 2020-02-25 | Resolved: 2020-11-16

## 2020-11-16 PROBLEM — F33.8 SEASONAL AFFECTIVE DISORDER (HCC): Status: ACTIVE | Noted: 2020-11-16

## 2020-11-16 PROCEDURE — G0439 PPPS, SUBSEQ VISIT: HCPCS | Performed by: INTERNAL MEDICINE

## 2020-11-16 PROCEDURE — G8427 DOCREV CUR MEDS BY ELIG CLIN: HCPCS | Performed by: INTERNAL MEDICINE

## 2020-11-16 PROCEDURE — G8399 PT W/DXA RESULTS DOCUMENT: HCPCS | Performed by: INTERNAL MEDICINE

## 2020-11-16 PROCEDURE — 1036F TOBACCO NON-USER: CPT | Performed by: INTERNAL MEDICINE

## 2020-11-16 PROCEDURE — G8417 CALC BMI ABV UP PARAM F/U: HCPCS | Performed by: INTERNAL MEDICINE

## 2020-11-16 PROCEDURE — 1090F PRES/ABSN URINE INCON ASSESS: CPT | Performed by: INTERNAL MEDICINE

## 2020-11-16 PROCEDURE — 99213 OFFICE O/P EST LOW 20 MIN: CPT | Performed by: INTERNAL MEDICINE

## 2020-11-16 PROCEDURE — 4040F PNEUMOC VAC/ADMIN/RCVD: CPT | Performed by: INTERNAL MEDICINE

## 2020-11-16 PROCEDURE — G8484 FLU IMMUNIZE NO ADMIN: HCPCS | Performed by: INTERNAL MEDICINE

## 2020-11-16 PROCEDURE — 1123F ACP DISCUSS/DSCN MKR DOCD: CPT | Performed by: INTERNAL MEDICINE

## 2020-11-16 ASSESSMENT — PATIENT HEALTH QUESTIONNAIRE - PHQ9
1. LITTLE INTEREST OR PLEASURE IN DOING THINGS: 0
SUM OF ALL RESPONSES TO PHQ QUESTIONS 1-9: 0
SUM OF ALL RESPONSES TO PHQ QUESTIONS 1-9: 0
SUM OF ALL RESPONSES TO PHQ9 QUESTIONS 1 & 2: 0
SUM OF ALL RESPONSES TO PHQ QUESTIONS 1-9: 0
2. FEELING DOWN, DEPRESSED OR HOPELESS: 0

## 2020-11-16 ASSESSMENT — ENCOUNTER SYMPTOMS
TROUBLE SWALLOWING: 0
GASTROINTESTINAL NEGATIVE: 1
RESPIRATORY NEGATIVE: 1

## 2020-11-16 ASSESSMENT — LIFESTYLE VARIABLES: HOW OFTEN DO YOU HAVE A DRINK CONTAINING ALCOHOL: 0

## 2020-11-16 NOTE — ASSESSMENT & PLAN NOTE
Mild symptoms-especially exacerbated by the Covid quarantine. She does not need medications. Encouraged her to get out of her house during the day as much as possible and be active.

## 2020-11-16 NOTE — PATIENT INSTRUCTIONS
Personalized Preventive Plan for Dock Jose Carlos - 11/16/2020  Medicare offers a range of preventive health benefits. Some of the tests and screenings are paid in full while other may be subject to a deductible, co-insurance, and/or copay. Some of these benefits include a comprehensive review of your medical history including lifestyle, illnesses that may run in your family, and various assessments and screenings as appropriate. After reviewing your medical record and screening and assessments performed today your provider may have ordered immunizations, labs, imaging, and/or referrals for you. A list of these orders (if applicable) as well as your Preventive Care list are included within your After Visit Summary for your review. Other Preventive Recommendations:    · A preventive eye exam performed by an eye specialist is recommended every 1-2 years to screen for glaucoma; cataracts, macular degeneration, and other eye disorders. · A preventive dental visit is recommended every 6 months. · Try to get at least 150 minutes of exercise per week or 10,000 steps per day on a pedometer . · Order or download the FREE \"Exercise & Physical Activity: Your Everyday Guide\" from [employer]. Call 1-263.586.1198 or search [employer] online. · You need 5519-5229 mg of calcium and 9162-3082 IU of vitamin D per day. It is possible to meet your calcium requirement with diet alone, but a vitamin D supplement is usually necessary to meet this goal.  · When exposed to the sun, use a sunscreen that protects against both UVA and UVB radiation with an SPF of 30 or greater. Reapply every 2 to 3 hours or after sweating, drying off with a towel, or swimming. · Always wear a seat belt when traveling in a car. Always wear a helmet when riding a bicycle or motorcycle.

## 2020-11-16 NOTE — ASSESSMENT & PLAN NOTE
Routine checkup. Labs reviewed. She is generally staying safe and quarantine at home with her . No recent symptoms compatible with Covid.

## 2020-11-16 NOTE — PROGRESS NOTES
General: No scleral icterus. Extraocular Movements: Extraocular movements intact. Neck:      Musculoskeletal: No neck rigidity. Vascular: No carotid bruit or JVD. Cardiovascular:      Rate and Rhythm: Normal rate and regular rhythm. Pulses:           Carotid pulses are 2+ on the right side and 2+ on the left side. Radial pulses are 2+ on the right side and 2+ on the left side. Heart sounds: Normal heart sounds. Pulmonary:      Effort: Pulmonary effort is normal. No respiratory distress. Breath sounds: Normal breath sounds. No stridor. No wheezing. Abdominal:      General: Bowel sounds are normal. There is no abdominal bruit. Palpations: Abdomen is soft. Musculoskeletal:      Right lower leg: No edema. Left lower leg: No edema. Lymphadenopathy:      Head:      Right side of head: No submandibular adenopathy. Left side of head: No submandibular adenopathy. Cervical: No cervical adenopathy. Upper Body:      Right upper body: No supraclavicular adenopathy. Left upper body: No supraclavicular adenopathy. Skin:     Coloration: Skin is not jaundiced or pale. Neurological:      General: No focal deficit present. Mental Status: She is alert and oriented to person, place, and time. Cranial Nerves: Cranial nerves are intact. No cranial nerve deficit. Motor: No tremor. Coordination: Coordination is intact. Gait: Gait normal.   Psychiatric:         Attention and Perception: Attention normal.         Mood and Affect: Mood normal. Mood is not anxious or depressed. Speech: Speech normal.         Behavior: Behavior normal.         Thought Content: Thought content normal. Thought content does not include suicidal ideation. Cognition and Memory: Cognition normal.         Judgment: Judgment normal.         ASSESSMENT:       Encounter Diagnoses   Name Primary?     Routine general medical examination at a health [facility]

## 2020-12-16 PROBLEM — Z00.00 PREVENTATIVE HEALTH CARE: Status: RESOLVED | Noted: 2020-11-16 | Resolved: 2020-12-16

## 2021-01-11 DIAGNOSIS — I10 ESSENTIAL HYPERTENSION, BENIGN: ICD-10-CM

## 2021-01-11 RX ORDER — COLESEVELAM 180 1/1
TABLET ORAL
Qty: 540 TABLET | Refills: 3 | Status: SHIPPED | OUTPATIENT
Start: 2021-01-11 | End: 2022-01-03 | Stop reason: ALTCHOICE

## 2021-01-26 ENCOUNTER — OFFICE VISIT (OUTPATIENT)
Dept: ORTHOPEDIC SURGERY | Age: 81
End: 2021-01-26
Payer: MEDICARE

## 2021-01-26 VITALS — TEMPERATURE: 97.7 F

## 2021-01-26 DIAGNOSIS — M17.12 PRIMARY OSTEOARTHRITIS OF LEFT KNEE: Primary | ICD-10-CM

## 2021-01-26 PROCEDURE — 20610 DRAIN/INJ JOINT/BURSA W/O US: CPT | Performed by: PHYSICIAN ASSISTANT

## 2021-01-26 NOTE — PROGRESS NOTES
Subjective:      Patient ID: Vivi Marcos is a 68 y.o.  female. Chief Complaint   Patient presents with    Knee Problem     Left knee        HPI: She is here for evaluation and treatment of left knee pain related to arthritis. She states the previous injection which was performed 9/22/2020  helped relieve the knee symptoms. The knee pain has gradually returned. There has not been a recent injury. Pain is 5/10. Pain is worse with activity. Pain improves somewhat with rest and elevation. Review of Systems:   Negative for fever or chills. Negative for numbness or tingling around the knee. Past Medical History:   Diagnosis Date    Arthritis     Cataract     High blood pressure     Hyperlipidemia     stress test 2007    normal       Family History   Problem Relation Age of Onset    Cancer Other     High Blood Pressure Other        Past Surgical History:   Procedure Laterality Date    EYE SURGERY  1985    L eye - injury    JOINT REPLACEMENT      R TKR - Dr Selena Zapata Not on file   Tobacco Use    Smoking status: Never Smoker    Smokeless tobacco: Never Used   Substance and Sexual Activity    Alcohol use: No     Alcohol/week: 0.0 standard drinks    Drug use: No    Sexual activity: Not on file       Current Outpatient Medications   Medication Sig Dispense Refill    colesevelam (WELCHOL) 625 MG tablet TAKE 3 TABLETS BY MOUTH TWO TIMES DAILY WITH MEALS 540 tablet 3    furosemide (LASIX) 40 MG tablet TAKE 1 TABLET BY MOUTH  DAILY 90 tablet 2    spironolactone (ALDACTONE) 25 MG tablet TAKE 1 TABLET BY MOUTH  DAILY 90 tablet 2    carvedilol (COREG) 25 MG tablet TAKE 1 TABLET BY MOUTH TWO  TIMES DAILY 180 tablet 3    losartan (COZAAR) 100 MG tablet TAKE 1 TABLET BY MOUTH  DAILY 90 tablet 3    traMADol (ULTRAM) 50 MG tablet Take 1 tablet by mouth every 8 hours as needed for Pain 270 tablet 1    aspirin 81 MG tablet Take 81 mg by mouth daily.  Ascorbic Acid (VITAMIN C) 1000 MG tablet Take 1,000 mg by mouth daily.  calcium-vitamin D 500 MG tablet Take 1 tablet by mouth 2 times daily.  Multiple Vitamin (MULTIVITAMIN PO) Take  by mouth. No current facility-administered medications for this visit. Objective:   She is alert, oriented x 3, pleasant, well nourished, developed and in no acute distress. Temp 97.7 °F (36.5 °C) (Temporal)      Knee exam:  Examination of the left knee shows: There is not erythema. ROM- decreased range of motion is noted. There is mild to moderate pain associated with ROM testing. Extensor Mechanism is intact. X Rays: was not performed in the office today:       Diagnosis       ICD-10-CM    1. Primary osteoarthritis of left knee  M17.12 [place] ARTHROCENTESIS ASPIR&/INJ MAJOR JT/BURSA W/O US     [place] TRIAMCINOLONE ACETONIDE INJ        Assessment/ Plan:       Assessment:  Advanced degenerative arthritis of the left knee. Good relief with prior corticosteroid injections. She is not interested in knee arthroplasty. She has undergone right knee arthroplasty in the past is does not wish to pursue same for her left knee. 10 minutes was the total time spent on today's visit  including reviewing test results, obtaining or reviewing history, physical exam, time spent on documentation or ordering prescriptions, tests and procedures after the visit. Plan:    Medications-no new medications prescribed today. PT- A home exercise program was instructed today including ROM exercises and strengthening exercises. The patient verbalized understanding of these exercises as well as the importance of the exercise program to promote return of normal function. If pain intensifies or other problems arise you are to notify the office. Further Imaging-none    Procedures- left knee intra-articular corticosteroid injection.       Risk and benefits of corticosteroid intra-articular injection was discussed

## 2021-02-01 RX ORDER — LOSARTAN POTASSIUM 100 MG/1
TABLET ORAL
Qty: 90 TABLET | Refills: 3 | Status: SHIPPED | OUTPATIENT
Start: 2021-02-01 | End: 2022-01-07

## 2021-02-17 ENCOUNTER — TELEPHONE (OUTPATIENT)
Dept: INTERNAL MEDICINE CLINIC | Age: 81
End: 2021-02-17

## 2021-02-17 RX ORDER — CIPROFLOXACIN 500 MG/1
500 TABLET, FILM COATED ORAL 2 TIMES DAILY
Qty: 14 TABLET | Refills: 0 | Status: SHIPPED | OUTPATIENT
Start: 2021-02-17 | End: 2021-02-24

## 2021-02-17 NOTE — TELEPHONE ENCOUNTER
Is having symptoms of a UTI. Is having burning, frequency urinating all night. She stated she has never had it this bad.        Asking for a script sent to [facility] on [address]

## 2021-03-08 ENCOUNTER — OFFICE VISIT (OUTPATIENT)
Dept: INTERNAL MEDICINE CLINIC | Age: 81
End: 2021-03-08
Payer: MEDICARE

## 2021-03-08 VITALS
BODY MASS INDEX: 33.49 KG/M2 | HEIGHT: 68 IN | SYSTOLIC BLOOD PRESSURE: 160 MMHG | WEIGHT: 221 LBS | DIASTOLIC BLOOD PRESSURE: 80 MMHG | TEMPERATURE: 98.2 F

## 2021-03-08 DIAGNOSIS — F33.8 SEASONAL AFFECTIVE DISORDER (HCC): ICD-10-CM

## 2021-03-08 DIAGNOSIS — Z13.31 POSITIVE DEPRESSION SCREENING: Primary | ICD-10-CM

## 2021-03-08 DIAGNOSIS — I10 ESSENTIAL HYPERTENSION: ICD-10-CM

## 2021-03-08 DIAGNOSIS — F32.9 DEPRESSION, REACTIVE: ICD-10-CM

## 2021-03-08 PROCEDURE — 1036F TOBACCO NON-USER: CPT | Performed by: INTERNAL MEDICINE

## 2021-03-08 PROCEDURE — 1090F PRES/ABSN URINE INCON ASSESS: CPT | Performed by: INTERNAL MEDICINE

## 2021-03-08 PROCEDURE — 1123F ACP DISCUSS/DSCN MKR DOCD: CPT | Performed by: INTERNAL MEDICINE

## 2021-03-08 PROCEDURE — G8399 PT W/DXA RESULTS DOCUMENT: HCPCS | Performed by: INTERNAL MEDICINE

## 2021-03-08 PROCEDURE — G8484 FLU IMMUNIZE NO ADMIN: HCPCS | Performed by: INTERNAL MEDICINE

## 2021-03-08 PROCEDURE — G8417 CALC BMI ABV UP PARAM F/U: HCPCS | Performed by: INTERNAL MEDICINE

## 2021-03-08 PROCEDURE — 99214 OFFICE O/P EST MOD 30 MIN: CPT | Performed by: INTERNAL MEDICINE

## 2021-03-08 PROCEDURE — G8427 DOCREV CUR MEDS BY ELIG CLIN: HCPCS | Performed by: INTERNAL MEDICINE

## 2021-03-08 PROCEDURE — 4040F PNEUMOC VAC/ADMIN/RCVD: CPT | Performed by: INTERNAL MEDICINE

## 2021-03-08 RX ORDER — ESCITALOPRAM OXALATE 10 MG/1
10 TABLET ORAL DAILY
Qty: 30 TABLET | Refills: 5 | Status: ON HOLD | OUTPATIENT
Start: 2021-03-08 | End: 2021-03-20 | Stop reason: HOSPADM

## 2021-03-08 ASSESSMENT — PATIENT HEALTH QUESTIONNAIRE - PHQ9
8. MOVING OR SPEAKING SO SLOWLY THAT OTHER PEOPLE COULD HAVE NOTICED. OR THE OPPOSITE, BEING SO FIGETY OR RESTLESS THAT YOU HAVE BEEN MOVING AROUND A LOT MORE THAN USUAL: 3
1. LITTLE INTEREST OR PLEASURE IN DOING THINGS: 2
9. THOUGHTS THAT YOU WOULD BE BETTER OFF DEAD, OR OF HURTING YOURSELF: 0
2. FEELING DOWN, DEPRESSED OR HOPELESS: 2
5. POOR APPETITE OR OVEREATING: 3
4. FEELING TIRED OR HAVING LITTLE ENERGY: 3
7. TROUBLE CONCENTRATING ON THINGS, SUCH AS READING THE NEWSPAPER OR WATCHING TELEVISION: 3
SUM OF ALL RESPONSES TO PHQ QUESTIONS 1-9: 18

## 2021-03-08 ASSESSMENT — COLUMBIA-SUICIDE SEVERITY RATING SCALE - C-SSRS: 1. WITHIN THE PAST MONTH, HAVE YOU WISHED YOU WERE DEAD OR WISHED YOU COULD GO TO SLEEP AND NOT WAKE UP?: NO

## 2021-03-08 ASSESSMENT — ENCOUNTER SYMPTOMS: RESPIRATORY NEGATIVE: 1

## 2021-03-08 NOTE — ASSESSMENT & PLAN NOTE
Multiple small things going on in her life but mostly stress during the pandemic. She has not had any depression symptoms in the past.  She feels overwhelmed and he has no mental energy or ambition. She is not suicidal.  She started to feel like this approximately 3 months ago but is now ready for treatment. We will start Lexapro. Offered therapy but she is more inclined to try the medication first.  Discussed the medication. She will call in 1 month with efficacy and follow-up in the spring.

## 2021-03-08 NOTE — PROGRESS NOTES
SUBJECTIVE:  Patient ID: Sudha Boston is an 68 y.o. female. HPI: Patient here today for the f/u of chronic problems-- see Problem List and associated comments. New issues or complaints include (also see Assessment for more details): Depression symptoms-she feels like she has a lack of energy and motivation and sense of hopelessness. She has lost her mental ambition to participate in activities and relationships with others. She has not suicidal.  Her sleep is okay but her energy level is low. Appetite is fair. She has not had anything like this in the past.  She believes the symptoms started approximately 3 to 4 months ago and have gotten worse instead of better. She discussed some of the symptoms at her last visit but was not ready for any intervention at that time. Review of Systems   Constitutional: Positive for activity change. Respiratory: Negative. Cardiovascular: Negative. Neurological: Negative for headaches. Psychiatric/Behavioral: Positive for dysphoric mood. Negative for sleep disturbance and suicidal ideas. The patient is nervous/anxious. OBJECTIVE:    BP (!) 160/80 (Site: Right Upper Arm)   Temp 98.2 °F (36.8 °C) (Temporal)   Ht 5' 8\" (1.727 m)   Wt 221 lb (100.2 kg)   BMI 33.60 kg/m²      Physical Exam  Constitutional:       Appearance: She is obese. She is not ill-appearing or toxic-appearing. Cardiovascular:      Rate and Rhythm: Normal rate and regular rhythm. Pulmonary:      Effort: Pulmonary effort is normal. No respiratory distress. Neurological:      General: No focal deficit present. Mental Status: She is oriented to person, place, and time. Psychiatric:         Attention and Perception: Attention normal.         Mood and Affect: Mood is depressed. Mood is not anxious. Speech: Speech normal.         Behavior: Behavior normal. Behavior is not aggressive, withdrawn or hyperactive. Thought Content:  Thought content does not include suicidal ideation. Cognition and Memory: Cognition normal.         ASSESSMENT:       Encounter Diagnoses   Name Primary?  Positive depression screening Yes    Depression, reactive     Seasonal affective disorder (HCC)     Essential hypertension        Depression, reactive  Multiple small things going on in her life but mostly stress during the pandemic. She has not had any depression symptoms in the past.  She feels overwhelmed and he has no mental energy or ambition. She is not suicidal.  She started to feel like this approximately 3 months ago but is now ready for treatment. We will start Lexapro. Offered therapy but she is more inclined to try the medication first.  Discussed the medication. She will call in 1 month with efficacy and follow-up in the spring. Seasonal affective disorder ([facility] [address]  See reactive depression    Hypertension  BP elevated. Outside BP better. No changes at this time. She is very stressed this morning. PLAN:See ASSESSMENT for evaluation & PLAN     No orders of the defined types were placed in this encounter.    , PMH, SH and FH reviewed and noted. Recent and past labs, tests and consultsalso reviewed. Recent or new meds also reviewed.

## 2021-03-15 ENCOUNTER — TELEPHONE (OUTPATIENT)
Dept: INTERNAL MEDICINE CLINIC | Age: 81
End: 2021-03-15

## 2021-03-15 NOTE — TELEPHONE ENCOUNTER
It would be rare for medication such as her antidepressant to cause a fever. I suggest she continue the medication and look for other signs of infection or illness.

## 2021-03-15 NOTE — TELEPHONE ENCOUNTER
Pt states she talk to Dr. Henna Gorman over the weekend and said she would leave a message for Dr. Shasta Sweet and for her to get in touch with him Monday concerning fever since thursday of last week. She did start a new medication for depression and did not know if it has something to do with that.  Please advise

## 2021-03-15 NOTE — TELEPHONE ENCOUNTER
Okay to take Tylenol. As long as her temperature is under 100 she should get the second Covid vaccine. Maybe she should schedule appointment to see me in a couple weeks, she can always cancel if things have improved.

## 2021-03-15 NOTE — TELEPHONE ENCOUNTER
Spoke to pt gave advise. Pt stated she was having problems with her feet they were cold so she put a bed duane on her feet and that helped. She did that for a couple weeks. She stated apparently she burnt the nerves inside her leg she thinks and didn't realize it. She took the pill Wednesday and next day notice how hot she was. She stated that Dr. Maribell Storey told her the medication could mess up her stomach,      She doesn't eat much, drinks a lot of water. Took tylenol every 6 hours for 3 days and didn't help. This morning her temp was 99.1 then before she called it was 99.2. she has no other pains or symptoms. She stated the only other thing she could think of was she took the depression pill Monday and Wednesday it was crazy. She claims she has no other issues. Should she try tylenol around the clock for a couple days? ? Her second covid vaccine is Friday should she still go get it?

## 2021-03-16 ENCOUNTER — APPOINTMENT (OUTPATIENT)
Dept: CT IMAGING | Age: 81
DRG: 392 | End: 2021-03-16
Payer: MEDICARE

## 2021-03-16 ENCOUNTER — APPOINTMENT (OUTPATIENT)
Dept: GENERAL RADIOLOGY | Age: 81
DRG: 392 | End: 2021-03-16
Payer: MEDICARE

## 2021-03-16 ENCOUNTER — HOSPITAL ENCOUNTER (INPATIENT)
Age: 81
LOS: 4 days | Discharge: HOME | DRG: 392 | End: 2021-03-20
Attending: EMERGENCY MEDICINE | Admitting: INTERNAL MEDICINE
Payer: MEDICARE

## 2021-03-16 DIAGNOSIS — R42 DIZZINESS: ICD-10-CM

## 2021-03-16 DIAGNOSIS — I95.1 ORTHOSTATIC HYPOTENSION: Primary | ICD-10-CM

## 2021-03-16 DIAGNOSIS — R53.1 GENERAL WEAKNESS: ICD-10-CM

## 2021-03-16 DIAGNOSIS — R50.9 FEVER, UNSPECIFIED FEVER CAUSE: ICD-10-CM
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
## 2021-03-16 PROBLEM — R19.7 DIARRHEA: Status: ACTIVE | Noted: 2021-03-16

## 2021-03-16 PROBLEM — I16.0 HYPERTENSIVE URGENCY: Status: ACTIVE | Noted: 2021-03-16
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
## 2021-03-16 LAB
ALBUMIN SERPL-MCNC: 3.9 G/DL (ref 3.4–5)
ALP BLD-CCNC: 82 U/L (ref 40–129)
ALT SERPL-CCNC: 26 U/L (ref 10–40)
ANION GAP SERPL CALCULATED.3IONS-SCNC: 14 MMOL/L (ref 3–16)
AST SERPL-CCNC: 19 U/L (ref 15–37)
BACTERIA: ABNORMAL /HPF
BASOPHILS ABSOLUTE: 0 K/UL (ref 0–0.2)
BASOPHILS RELATIVE PERCENT: 0.5 %
BILIRUB SERPL-MCNC: 2.5 MG/DL (ref 0–1)
BILIRUBIN DIRECT: 0.4 MG/DL (ref 0–0.3)
BILIRUBIN URINE: ABNORMAL
BILIRUBIN, INDIRECT: 2.1 MG/DL (ref 0–1)
BLOOD, URINE: ABNORMAL
BUN BLDV-MCNC: 9 MG/DL (ref 7–20)
CALCIUM SERPL-MCNC: 9.5 MG/DL (ref 8.3–10.6)
CHLORIDE BLD-SCNC: 97 MMOL/L (ref 99–110)
CLARITY: ABNORMAL
CO2: 22 MMOL/L (ref 21–32)
COLOR: YELLOW
CREAT SERPL-MCNC: <0.5 MG/DL (ref 0.6–1.2)
EKG ATRIAL RATE: 72 BPM
EKG DIAGNOSIS: NORMAL
EKG P AXIS: 35 DEGREES
EKG P-R INTERVAL: 126 MS
EKG Q-T INTERVAL: 410 MS
EKG QRS DURATION: 84 MS
EKG QTC CALCULATION (BAZETT): 448 MS
EKG R AXIS: 3 DEGREES
EKG T AXIS: 9 DEGREES
EKG VENTRICULAR RATE: 72 BPM
EOSINOPHILS ABSOLUTE: 0.1 K/UL (ref 0–0.6)
EOSINOPHILS RELATIVE PERCENT: 1 %
EPITHELIAL CELLS, UA: 5 /HPF (ref 0–5)
GFR AFRICAN AMERICAN: >60
GFR NON-AFRICAN AMERICAN: >60
GLUCOSE BLD-MCNC: 111 MG/DL (ref 70–99)
GLUCOSE URINE: NEGATIVE MG/DL
HCT VFR BLD CALC: 43.9 % (ref 36–48)
HEMOGLOBIN: 15.5 G/DL (ref 12–16)
HYALINE CASTS: 16 /LPF (ref 0–8)
KETONES, URINE: 15 MG/DL
LACTIC ACID, SEPSIS: 1.1 MMOL/L (ref 0.4–1.9)
LEUKOCYTE ESTERASE, URINE: NEGATIVE
LIPASE: 34 U/L (ref 13–60)
LYMPHOCYTES ABSOLUTE: 0.9 K/UL (ref 1–5.1)
LYMPHOCYTES RELATIVE PERCENT: 12.8 %
MCH RBC QN AUTO: 31.7 PG (ref 26–34)
MCHC RBC AUTO-ENTMCNC: 35.3 G/DL (ref 31–36)
MCV RBC AUTO: 89.9 FL (ref 80–100)
MICROSCOPIC EXAMINATION: YES
MONOCYTES ABSOLUTE: 0.4 K/UL (ref 0–1.3)
MONOCYTES RELATIVE PERCENT: 6.3 %
NEUTROPHILS ABSOLUTE: 5.3 K/UL (ref 1.7–7.7)
NEUTROPHILS RELATIVE PERCENT: 79.4 %
NITRITE, URINE: NEGATIVE
PDW BLD-RTO: 13.8 % (ref 12.4–15.4)
PH UA: 6 (ref 5–8)
PLATELET # BLD: 211 K/UL (ref 135–450)
PMV BLD AUTO: 8.6 FL (ref 5–10.5)
POTASSIUM REFLEX MAGNESIUM: 3.8 MMOL/L (ref 3.5–5.1)
PROTEIN UA: 30 MG/DL
RBC # BLD: 4.88 M/UL (ref 4–5.2)
RBC UA: 10 /HPF (ref 0–4)
SARS-COV-2, NAAT: NOT DETECTED
SODIUM BLD-SCNC: 133 MMOL/L (ref 136–145)
SPECIFIC GRAVITY UA: 1.02 (ref 1–1.03)
TOTAL PROTEIN: 6.6 G/DL (ref 6.4–8.2)
TROPONIN: <0.01 NG/ML
URINE REFLEX TO CULTURE: ABNORMAL
URINE TYPE: ABNORMAL
UROBILINOGEN, URINE: 0.2 E.U./DL
WBC # BLD: 6.7 K/UL (ref 4–11)
WBC UA: 5 /HPF (ref 0–5)

## 2021-03-16 PROCEDURE — 93010 ELECTROCARDIOGRAM REPORT: CPT | Performed by: INTERNAL MEDICINE

## 2021-03-16 PROCEDURE — 2580000003 HC RX 258: Performed by: PHYSICIAN ASSISTANT

## 2021-03-16 PROCEDURE — 99283 EMERGENCY DEPT VISIT LOW MDM: CPT

## 2021-03-16 PROCEDURE — 70450 CT HEAD/BRAIN W/O DYE: CPT

## 2021-03-16 PROCEDURE — 2060000000 HC ICU INTERMEDIATE R&B

## 2021-03-16 PROCEDURE — 6370000000 HC RX 637 (ALT 250 FOR IP): Performed by: INTERNAL MEDICINE

## 2021-03-16 PROCEDURE — 87040 BLOOD CULTURE FOR BACTERIA: CPT

## 2021-03-16 PROCEDURE — 84484 ASSAY OF TROPONIN QUANT: CPT

## 2021-03-16 PROCEDURE — 81001 URINALYSIS AUTO W/SCOPE: CPT

## 2021-03-16 PROCEDURE — 6360000002 HC RX W HCPCS: Performed by: INTERNAL MEDICINE

## 2021-03-16 PROCEDURE — 87635 SARS-COV-2 COVID-19 AMP PRB: CPT

## 2021-03-16 PROCEDURE — 96360 HYDRATION IV INFUSION INIT: CPT

## 2021-03-16 PROCEDURE — 71045 X-RAY EXAM CHEST 1 VIEW: CPT

## 2021-03-16 PROCEDURE — 93005 ELECTROCARDIOGRAM TRACING: CPT | Performed by: PHYSICIAN ASSISTANT

## 2021-03-16 PROCEDURE — 85025 COMPLETE CBC W/AUTO DIFF WBC: CPT

## 2021-03-16 PROCEDURE — 80076 HEPATIC FUNCTION PANEL: CPT

## 2021-03-16 PROCEDURE — 83690 ASSAY OF LIPASE: CPT

## 2021-03-16 PROCEDURE — 2580000003 HC RX 258: Performed by: INTERNAL MEDICINE

## 2021-03-16 PROCEDURE — 80048 BASIC METABOLIC PNL TOTAL CA: CPT

## 2021-03-16 PROCEDURE — 83605 ASSAY OF LACTIC ACID: CPT

## 2021-03-16 RX ORDER — CLONIDINE HYDROCHLORIDE 0.1 MG/1
0.1 TABLET ORAL ONCE
Status: DISCONTINUED | OUTPATIENT
Start: 2021-03-16 | End: 2021-03-16

## 2021-03-16 RX ORDER — ACETAMINOPHEN 650 MG/1
650 SUPPOSITORY RECTAL EVERY 4 HOURS PRN
Status: DISCONTINUED | OUTPATIENT
Start: 2021-03-16 | End: 2021-03-20 | Stop reason: HOSPADM

## 2021-03-16 RX ORDER — CLONIDINE HYDROCHLORIDE 0.1 MG/1
0.1 TABLET ORAL ONCE
Status: DISCONTINUED | OUTPATIENT
Start: 2021-03-16 | End: 2021-03-20 | Stop reason: HOSPADM

## 2021-03-16 RX ORDER — ACETAMINOPHEN 325 MG/1
650 TABLET ORAL EVERY 4 HOURS PRN
Status: DISCONTINUED | OUTPATIENT
Start: 2021-03-16 | End: 2021-03-20 | Stop reason: HOSPADM

## 2021-03-16 RX ORDER — LOSARTAN POTASSIUM 25 MG/1
100 TABLET ORAL DAILY
Status: DISCONTINUED | OUTPATIENT
Start: 2021-03-17 | End: 2021-03-20 | Stop reason: HOSPADM

## 2021-03-16 RX ORDER — CARVEDILOL 6.25 MG/1
25 TABLET ORAL 2 TIMES DAILY WITH MEALS
Status: DISCONTINUED | OUTPATIENT
Start: 2021-03-16 | End: 2021-03-20 | Stop reason: HOSPADM

## 2021-03-16 RX ORDER — SODIUM CHLORIDE 0.9 % (FLUSH) 0.9 %
10 SYRINGE (ML) INJECTION PRN
Status: DISCONTINUED | OUTPATIENT
Start: 2021-03-16 | End: 2021-03-16

## 2021-03-16 RX ORDER — ONDANSETRON 2 MG/ML
4 INJECTION INTRAMUSCULAR; INTRAVENOUS EVERY 4 HOURS PRN
Status: DISCONTINUED | OUTPATIENT
Start: 2021-03-16 | End: 2021-03-20 | Stop reason: HOSPADM

## 2021-03-16 RX ORDER — SODIUM CHLORIDE 0.9 % (FLUSH) 0.9 %
10 SYRINGE (ML) INJECTION PRN
Status: DISCONTINUED | OUTPATIENT
Start: 2021-03-16 | End: 2021-03-20 | Stop reason: HOSPADM

## 2021-03-16 RX ORDER — TRAMADOL HYDROCHLORIDE 50 MG/1
50 TABLET ORAL EVERY 8 HOURS PRN
Status: DISCONTINUED | OUTPATIENT
Start: 2021-03-16 | End: 2021-03-20 | Stop reason: HOSPADM

## 2021-03-16 RX ORDER — HYDRALAZINE HYDROCHLORIDE 20 MG/ML
10 INJECTION INTRAMUSCULAR; INTRAVENOUS EVERY 4 HOURS PRN
Status: DISCONTINUED | OUTPATIENT
Start: 2021-03-16 | End: 2021-03-17

## 2021-03-16 RX ORDER — ASPIRIN 81 MG/1
81 TABLET, CHEWABLE ORAL DAILY
Status: DISCONTINUED | OUTPATIENT
Start: 2021-03-16 | End: 2021-03-20 | Stop reason: HOSPADM

## 2021-03-16 RX ORDER — POLYETHYLENE GLYCOL 3350 17 G/17G
17 POWDER, FOR SOLUTION ORAL DAILY PRN
Status: DISCONTINUED | OUTPATIENT
Start: 2021-03-16 | End: 2021-03-20 | Stop reason: HOSPADM

## 2021-03-16 RX ORDER — 0.9 % SODIUM CHLORIDE 0.9 %
1000 INTRAVENOUS SOLUTION INTRAVENOUS ONCE
Status: COMPLETED | OUTPATIENT
Start: 2021-03-16 | End: 2021-03-16

## 2021-03-16 RX ORDER — LOSARTAN POTASSIUM 25 MG/1
100 TABLET ORAL ONCE
Status: COMPLETED | OUTPATIENT
Start: 2021-03-16 | End: 2021-03-16

## 2021-03-16 RX ORDER — CHOLESTYRAMINE LIGHT 4 G/5.7G
4 POWDER, FOR SUSPENSION ORAL DAILY
Status: DISCONTINUED | OUTPATIENT
Start: 2021-03-17 | End: 2021-03-20 | Stop reason: HOSPADM

## 2021-03-16 RX ORDER — SODIUM CHLORIDE 0.9 % (FLUSH) 0.9 %
10 SYRINGE (ML) INJECTION EVERY 12 HOURS SCHEDULED
Status: DISCONTINUED | OUTPATIENT
Start: 2021-03-16 | End: 2021-03-20 | Stop reason: HOSPADM

## 2021-03-16 RX ORDER — SODIUM CHLORIDE 9 MG/ML
INJECTION, SOLUTION INTRAVENOUS CONTINUOUS
Status: DISCONTINUED | OUTPATIENT
Start: 2021-03-16 | End: 2021-03-19

## 2021-03-16 RX ORDER — SODIUM CHLORIDE 0.9 % (FLUSH) 0.9 %
10 SYRINGE (ML) INJECTION EVERY 12 HOURS SCHEDULED
Status: DISCONTINUED | OUTPATIENT
Start: 2021-03-16 | End: 2021-03-16 | Stop reason: SDUPTHER

## 2021-03-16 RX ADMIN — LOSARTAN POTASSIUM 100 MG: 25 TABLET, FILM COATED ORAL at 16:27

## 2021-03-16 RX ADMIN — SODIUM CHLORIDE 1000 ML: 9 INJECTION, SOLUTION INTRAVENOUS at 14:50

## 2021-03-16 RX ADMIN — SODIUM CHLORIDE: 9 INJECTION, SOLUTION INTRAVENOUS at 17:35

## 2021-03-16 RX ADMIN — CARVEDILOL 25 MG: 6.25 TABLET, FILM COATED ORAL at 17:48

## 2021-03-16 RX ADMIN — ASPIRIN 81 MG: 81 TABLET, CHEWABLE ORAL at 17:48

## 2021-03-16 RX ADMIN — ENOXAPARIN SODIUM 40 MG: 40 INJECTION SUBCUTANEOUS at 22:23

## 2021-03-16 ASSESSMENT — PAIN SCALES - GENERAL: PAINLEVEL_OUTOF10: 0

## 2021-03-16 ASSESSMENT — PAIN SCALES - WONG BAKER: WONGBAKER_NUMERICALRESPONSE: 0

## 2021-03-16 NOTE — ED PROVIDER NOTES
[address] [facility]        Pt Name: Best Gonzáles  MRN: [id number]  [place] 1940  Date of evaluation: 3/16/2021  Provider: Rachel Flood PA-C  PCP: Joey Asher MD     I have seen and evaluated this patient with my supervising physician Sissy Cruz MD.    [address] [place] [address]       Chief Complaint   Patient presents with    Fever     pt complains of feeling hot 6 days ago.  Fall     diarrhea x 4 today. pt fell after leaving bathroom. HISTORY OF PRESENT ILLNESS   (Location, Timing/Onset, Context/Setting, Quality, Duration, Modifying Factors, Severity, Associated Signs and Symptoms)  Note limiting factors. Best Gonzáles is a 68 y.o. female who indicates that she has not been feeling as well for the last several days. She states that she first noticed that she was feeling feverish last Wednesday evening. She states that every evening since then she has felt feverish all night long with sweats and chills and temperatures between 99.5 and 99.8 when she has checked them. She states that she really has not had a lot of other symptoms along with those elevated temperatures. She denies any cough or congestion. No abdominal pain or difficult eating or drinking. No urinary discomfort or increased urination or blood in urine. She indicates that she consulted her family doctor but really did not receive much help  Patient does indicate that she has been feeling generally weak over the course of these last several days. No one-sided weakness or difficulty speaking or understanding speech. No facial droop. No confusion or syncope or near syncope. Then she had diarrhea 4 times this morning and when she tried to get up from the toilet she felt dizzy causing her to fall to the ground. She states that she really didn't hurt anything other than a very small scrape on the outer right arm and on the right index finger.   She states that her  is unable to lift her and she do just lay on the ground until they were able to get EMS to come and bring her into the emergency department. Patient denies any head or neck pain or head contusion. She states that even the couple of scrapes really do not hurt that she has on the right upper extremity. She denies lower extremity changes or other complaints at this time. Patient mentions that she takes a number of daily medications including for blood pressure but is not certain what they are or whether or not she took them today. Nursing Notes were all reviewed and agreed with or any disagreements were addressed in the HPI. REVIEW OF SYSTEMS    (2-9 systems for level 4, 10 or more for level 5)     Review of Systems  Positive history as above with generalized weakness, feeling feverish daily over the last 7 days or so. No headache vision change neck pain or stiffness or shortness of breath or chest pain or palpitations. No dizziness or confusion syncope or near syncope. No abdominal pain or vomiting or abdominal distention. No difficulty seeing fluids or eating. No extremity increased heat or redness or bruising. No extremity acute loss of range of motion or strength. No rash. Positives and Pertinent negatives as per HPI. PAST MEDICAL HISTORY     Past Medical History:   Diagnosis Date    Arthritis     Cataract     High blood pressure     Hyperlipidemia     stress test 2007    normal         SURGICAL HISTORY     Past Surgical History:   Procedure Laterality Date    EYE SURGERY  1985    L eye - injury    JOINT REPLACEMENT      R TKR - Dr Christy Cruz       Previous Medications    ASCORBIC ACID (VITAMIN C) 1000 MG TABLET    Take 1,000 mg by mouth daily. ASPIRIN 81 MG TABLET    Take 81 mg by mouth daily. CALCIUM-VITAMIN D 500 MG TABLET    Take 1 tablet by mouth 2 times daily.     CARVEDILOL (COREG) 25 MG TABLET    TAKE 1 TABLET BY MOUTH TWO  TIMES pulses. Heart sounds: Normal heart sounds. No murmur. No gallop. Pulmonary:      Effort: Pulmonary effort is normal. No respiratory distress. Breath sounds: Normal breath sounds. No wheezing, rhonchi or rales. Abdominal:      General: Bowel sounds are normal. There is no distension. Palpations: Abdomen is soft. There is no mass. Tenderness: There is no abdominal tenderness. There is no right CVA tenderness, left CVA tenderness, guarding or rebound. Musculoskeletal: Normal range of motion. General: No swelling, tenderness, deformity or signs of injury. Right lower leg: No edema. Left lower leg: No edema. Skin:     General: Skin is warm and dry. Comments: Very limited small skin injury noted to the lateral right arm and distal right index finger with no active bleeding. Neurological:      Mental Status: She is alert and oriented to person, place, and time. Psychiatric:         Mood and Affect: Mood normal.         Behavior: Behavior normal.         DIAGNOSTIC RESULTS   LABS:    Labs Reviewed   URINE RT REFLEX TO CULTURE - Abnormal; Notable for the following components:       Result Value    Clarity, UA SL CLOUDY (*)     Bilirubin Urine SMALL (*)     Ketones, Urine 15 (*)     Blood, Urine TRACE-INTACT (*)     Protein, UA 30 (*)     All other components within normal limits    Narrative:     Performed at:  [facility]  [address] [facility] [address]   Phone [phone]   HEPATIC FUNCTION PANEL - Abnormal; Notable for the following components:     Total Bilirubin 2.5 (*)     Bilirubin, Direct 0.4 (*)     Bilirubin, Indirect 2.1 (*)     All other components within normal limits    Narrative:     Performed at:  [facility]  [address] [place] [address]   Phone [phone]   CBC WITH AUTO DIFFERENTIAL - Abnormal; Notable for the following components:    Lymphocytes Absolute 0.9 (*)     All other components within normal limits    Narrative:     Performed at:  [address] [place] [address]   Phone [phone]   BASIC METABOLIC PANEL W/ REFLEX TO MG FOR LOW K - Abnormal; Notable for the following components:    Sodium 133 (*)     Chloride 97 (*)     Glucose 111 (*)     CREATININE <0.5 (*)     All other components within normal limits    Narrative:     Performed at:  [address] [address]   Phone [phone]   MICROSCOPIC URINALYSIS - Abnormal; Notable for the following components:    Bacteria, UA 1+ (*)     Hyaline Casts, UA 16 (*)     RBC, UA 10 (*)     All other components within normal limits    Narrative:     Performed at:  [address] [address]   Phone [phone] RAPID    Narrative:     Performed at:  [address] [address]   Phone [phone]   CULTURE, BLOOD 1   CULTURE, BLOOD 2   C DIFF TOXIN/ANTIGEN   LACTATE, SEPSIS    Narrative:     Performed at:  [address] [place] [address]   Phone [phone]   TROPONIN    Narrative:     Performed at:  [facility]  [address] [place], [place] [address] [address]   Phone [phone]   LIPASE    Narrative:     Performed at:  [facility]  [address] [place] [address] [employer]   Phone [phone]   LACTATE, SEPSIS       All other labs were within normal range or not returned as of this dictation. EKG: All EKG's are interpreted by the Emergency Department Physician in the absence of a cardiologist.  Please see their note for interpretation of EKG.       RADIOLOGY:   Non-plain film images such as CT, Ultrasound and MRI are read by the radiologist. Migdalia Del Toro radiographic images are visualized and preliminarily interpreted by the ED Provider with the below findings:        Interpretation per the Radiologist below, if available at the time of this note:    CT HEAD WO CONTRAST   Final Result   No acute intracranial abnormality. XR CHEST PORTABLE   Final Result   No acute cardiopulmonary disease. Xr Chest Portable    Result Date: 3/16/2021  EXAMINATION: ONE XRAY VIEW OF THE CHEST 3/16/2021 12:58 pm COMPARISON: 01/13/2016. HISTORY: ORDERING SYSTEM PROVIDED HISTORY: weakness TECHNOLOGIST PROVIDED HISTORY: Reason for exam:->weakness FINDINGS: The cardiomediastinal silhouette is unremarkable. Aortic vascular calcification. The lungs are clear. No infiltrate, pleural fluid or evidence of overt failure. No acute cardiopulmonary disease. PROCEDURES   Unless otherwise noted below, none     Procedures    CRITICAL CARE TIME   N/A    CONSULTS:  None      EMERGENCY DEPARTMENT COURSE and DIFFERENTIAL DIAGNOSIS/MDM:   Vitals:    Vitals:    03/16/21 1214 03/16/21 1217   BP: (!) 224/75 (!) 208/104   Pulse: 72 73   Resp: 18 18   Temp:  98.3 °F (36.8 °C)   TempSrc:  Oral   SpO2: 99% 98%   Weight: 218 lb 7.6 oz (99.1 kg)    Height: 5' 6\" (1.676 m)        Patient was given the following medications:  Medications   sodium chloride flush 0.9 % injection 10 mL (has no administration in time range)   sodium chloride flush 0.9 % injection 10 mL (has no administration in time range)   0.9 % sodium chloride bolus (1,000 mLs Intravenous New Bag 3/16/21 1450)           This patient presents as above and evaluation and treatment is begun here. Patient is quite cognizant and helps care a good general history for herself. IV normal saline 1 L is given initially. EKG and chest x-ray obtained as well as CT head. These returned as above. Also 0.1mg of clonidine given for elevated blood pressure.   Labs to start return and show 30 of protein in the urine some cloudiness and ketones present as well as red blood cells and 1+ bacteria. Patient however is asymptomatic for urine. BMP comes back and shows some dehydration with sodium 133 chloride 97. Glucose 111. CBC also comes back looking stable with no thrombocytopenia or acute anemia or leukocytosis. COVID-19 swab obtained and this is negative. At this time there is not a clear sign or source of infection to explain patient's symptoms at home. Sepsis criteria not met at this time. The orthostatic blood pressures however are obtained and patient becomes grossly positive for orthostatic hypotension with a change from the patient's systolic on sitting at 681 to the blood pressure with standing around around 60 points lower at 146 with no significant heart rate change. She is symptomatic with this as well feeling dizzy when this occurs. This patient does need to be admitted for further care and treatment and hospitalist is consulted at this time. Patient is in fair condition and the hospitalist does agree to this admission. FINAL IMPRESSION      1. Orthostatic hypotension    2. Dizziness    3. General weakness    4. Fever, unspecified fever cause          DISPOSITION/PLAN   DISPOSITION        PATIENT REFERREDTO:  No follow-up provider specified.     DISCHARGE MEDICATIONS:  New Prescriptions    No medications on file       DISCONTINUED MEDICATIONS:  Discontinued Medications    No medications on file              (Please note that portions of this note were completed with a voice recognition program.  Efforts were made to edit the dictations but occasionally words are mis-transcribed.)    Elma Guerrero PA-C (electronically signed)            Elma Guerrero PA-C  03/16/21 2162

## 2021-03-16 NOTE — ED PROVIDER NOTES
I independently evaluated and obtained a history and physical on [facility]. All diagnostic, treatment, and disposition assistants were made to myself in conjunction the advanced practice provider. For further details of this patient's emergency department encounter, please see the advanced practice provider's documentation. History: 74-year-old female presents for evaluation of fever, weakness and fall at home. She states she had multiple episodes of diarrhea this morning no nausea vomiting no fever no headache    Physician Exam:   Constitutional:       Appearance: Normal appearance. She is not diaphoretic. HENT:      Head: Normocephalic and atraumatic. Right Ear: External ear normal.      Left Ear: External ear normal.      Nose: Nose normal.      Mouth/Throat:      Mouth: Mucous membranes are moist.   Eyes:      General:         Right eye: No discharge. Left eye: No discharge. Conjunctiva/sclera: Conjunctivae normal.   Neck:      Musculoskeletal: Normal range of motion and neck supple. Cardiovascular:      Rate and Rhythm: Normal rate and regular rhythm. Pulses: Normal pulses. Heart sounds: Normal heart sounds. No murmur. No gallop. Pulmonary:      Effort: Pulmonary effort is normal. No respiratory distress. Breath sounds: Normal breath sounds. No wheezing, rhonchi or rales. Abdominal:      General: Bowel sounds are normal. There is no distension. Palpations: Abdomen is soft. There is no mass. Tenderness: There is no abdominal tenderness. There is no right CVA tenderness, left CVA tenderness, guarding or rebound. Musculoskeletal: Normal range of motion. General: No swelling, tenderness, deformity or signs of injury. Right lower leg: No edema. Left lower leg: No edema. Skin:     General: Skin is warm and dry.       Comments: Very limited small skin injury noted to the lateral right arm and distal right index finger with no active bleeding. Neurological:      Mental Status: She is alert and oriented to person, place, and time. Psychiatric:         Mood and Affect: Mood normal.         Behavior: Behavior normal.     MDM: EKG interpreted by me as normal sinus rhythm with rate of 72 bpm no ST segment elevation    Work-up notable for orthostatic hypotension. The patient's imaging shows no acute abnormality. Of note also her blood pressure is elevated. Will admit.     Impression; dizziness, orthostatic hypotension, fever    (Please note that portions of this note may have been completed with a voice recognition program. Efforts were made to edit the dictations but occasionally words are mis-transcribed.)        Sarah Tucker MD  03/18/21 0142

## 2021-03-16 NOTE — PROGRESS NOTES
4 Eyes Skin Assessment     NAME:  Kwesi Barragan  YOB: 1940  MEDICAL RECORD NUMBER:  [id number]    The patient is being assess for  Admission    I agree that 2 RN's have performed a thorough Head to Toe Skin Assessment on the patient. ALL assessment sites listed below have been assessed. Areas assessed by both nurses:    Head, Face, Ears, Shoulders, Back, Chest, Arms, Elbows, Hands, Sacrum. Buttock, Coccyx, Ischium and Legs. Feet and Heels        Does the Patient have a Wound?  No noted wound(s)       Fareed Prevention initiated:  No   Wound Care Orders initiated:  No    Pressure Injury (Stage 3,4, Unstageable, DTI, NWPT, and Complex wounds) if present place consult order under [de-identified] No    New and Established Ostomies if present place consult order under : No      Nurse 1 eSignature: Electronically signed by Migdalia Nicolas RN on 3/16/21 at 6:20 PM EDT    **SHARE this note so that the co-signing nurse is able to place an eSignature**    Nurse 2 eSignature: Electronically signed by Janet Conway RN on 3/16/21 at 6:27 PM EDT

## 2021-03-16 NOTE — PLAN OF CARE
Problem: Falls - Risk of:  Goal: Will remain free from falls  Description: Will remain free from falls  Outcome: Ongoing   Fall risk assessment completed every shift. All precautions in place. Pt has call light within reach at all times. Room clear of clutter. Pt aware to call for assistance when getting up. Bed locked in lowest position, alarm engaged, call light within reach, will continue to monitor. Problem: Infection:  Goal: Will remain free from infection  Description: Will remain free from infection  Outcome: Ongoing     Problem: Safety:  Goal: Free from accidental physical injury  Description: Free from accidental physical injury  Outcome: Ongoing     Problem: Daily Care:  Goal: Daily care needs are met  Description: Daily care needs are met  Outcome: Ongoing   Patient's ability assessed to perform self care and independent activity encouraged according to that ability. Assisted with ADL's as needed. Risk for skin breakdown assessed. Potential discharge needs assessed. Patient and family included in daily care decisions. Problem: Pain:  Goal: Patient's pain/discomfort is manageable  Description: Patient's pain/discomfort is manageable  Outcome: Ongoing   Pain/discomfort being managed with PRN analgesics per MD orders. Pt able to express presence and absence of pain and rate pain appropriately using numerical scale. Problem: Skin Integrity:  Goal: Skin integrity will stabilize  Description: Skin integrity will stabilize  Outcome: Ongoing   Skin assessment completed every shift. Pt assessed for incontinence, appropriate barrier cream applied prn. Pt encouraged to turn/rotate every 2 hours. Assistance provided if pt unable to do so themselves.       Problem: Discharge Planning:  Goal: Patients continuum of care needs are met  Description: Patients continuum of care needs are met  Outcome: Ongoing

## 2021-03-16 NOTE — PROGRESS NOTES
Pt to 5269 via ED stretcher with all personal belongings. Oriented to room and role as RN. Telemonitor on and verified by [facility] NSR. Pt transfered to bed, alert and oriented x4. Assessment of pt in progress. POC and education initiated per protocol. Call light within reach. Bed in lowest position and wheels locked. Room is free of clutter. Personal belongings within reach. No current complaints at this time. Pt denies pain, SOB, vomiting, nausea. Pt and spouse aware of stool sample needed. Will continue to monitor.  Alfredo Cameron

## 2021-03-16 NOTE — H&P
Hospital Medicine  History and Physical    PCP: Deborah Rollins MD  Patient Name: Kuldeep Jaeger    Date of Service: Pt seen/examined on 03/16/2021 and admitted to Inpatient with expected LOS greater than two midnights due to medical therapy    CHIEF COMPLAINT:  Pt c/o feeling weak, fall at home  HISTORY OF PRESENT ILLNESS: Pt is an 68y.o. year-old female with a history of hypertension and morbid obesity who presents to the emergency room for evaluation after falling at home. He reports a several day history of feeling weak and feverish. She reports having temperatures between 99.5 and 99.8 when she checked, but states that those are high temperatures for her. This morning she had 4 episodes of diarrhea. Following the last episode she was attempting to get off of the toilet when she fell forward. She did not sustain any injury, and did not lose consciousness. In the emergency room she was found to have orthostatic hypotension along with hypertensive urgency. She states that she has not taken any of her blood pressure medications today. Associated signs and symptoms do not include nausea, vomiting, abdominal pain, hemoptysis, hematochezia, constipation or urinary symptoms. Past Medical History:        Diagnosis Date    Arthritis     Cataract     High blood pressure     Hyperlipidemia     stress test 2007    normal       Past Surgical History:        Procedure Laterality Date    EYE SURGERY  1985    L eye - injury    JOINT REPLACEMENT      R TKR - Dr Christie Burt       Allergies:  Codeine, Sulfa antibiotics, and Atorvastatin    Medications Prior to Admission:    Prior to Admission medications    Medication Sig Start Date End Date Taking?  Authorizing Provider   escitalopram (LEXAPRO) 10 MG tablet Take 1 tablet by mouth daily 3/8/21  Yes Deborah Rollins MD   losartan (COZAAR) 100 MG tablet TAKE 1 TABLET BY MOUTH  DAILY 2/1/21  Yes Deborah Rollins MD   colesevelam (WELCHOL) 625 MG tablet TAKE 3 TABLETS BY MOUTH TWO TIMES DAILY WITH MEALS 1/11/21  Yes Antoinette Horne MD   furosemide (LASIX) 40 MG tablet TAKE 1 TABLET BY MOUTH  DAILY 4/30/20  Yes Antoinette Horne MD   spironolactone (ALDACTONE) 25 MG tablet TAKE 1 TABLET BY MOUTH  DAILY 4/30/20  Yes Antoinette Horne MD   carvedilol (COREG) 25 MG tablet TAKE 1 TABLET BY MOUTH TWO  TIMES DAILY 3/17/20  Yes Antoinette Horne MD   traMADol Delford Ally) 50 MG tablet Take 1 tablet by mouth every 8 hours as needed for Pain 7/14/17  Yes Antoinette Horne MD   aspirin 81 MG tablet Take 81 mg by mouth daily. Yes Historical Provider, MD   Ascorbic Acid (VITAMIN C) 1000 MG tablet Take 1,000 mg by mouth daily. Yes Historical Provider, MD   calcium-vitamin D 500 MG tablet Take 1 tablet by mouth 2 times daily. Yes Historical Provider, MD   Multiple Vitamin (MULTIVITAMIN PO) Take 1 tablet by mouth daily    Yes Historical Provider, MD       Family History:       Problem Relation Age of Onset    Cancer Other     High Blood Pressure Other      Social History:   TOBACCO:   reports that she has never smoked. She has never used smokeless tobacco.  ETOH:   reports no history of alcohol use. OCCUPATION:      REVIEW OF SYSTEMS:  A full review of systems was performed and is negative except for that which appears in the HPI    Physical Exam:    Vitals: BP (!) 222/85   Pulse 76   Temp 98.3 °F (36.8 °C) (Oral)   Resp 17   Ht 5' 6\" (1.676 m)   Wt 218 lb 7.6 oz (99.1 kg)   SpO2 100%   BMI 35.26 kg/m²   General appearance: Morbidly Obese 68y.o. year-old female who is alert, appears stated age and is cooperative  HEENT: Head: Normocephalic, no lesions, without obvious abnormality. Eye: Normal external eye, conjunctiva, lids cornea, PEERL. Ears: Normal external ears. Non-tender. Nose: Normal external nose, mucus membranes and septum. Pharynx: Dental Hygiene adequate. Normal buccal mucosa. Normal pharynx.   Neck: no adenopathy, no carotid bruit, no JVD, supple, symmetrical, trachea midline and thyroid not enlarged, symmetric, no tenderness/mass/nodules  Lungs: clear to auscultation bilaterally and no use of accessory muscles. Heart: regular rate and rhythm, S1, S2 normal, no murmur, click, rub or gallop and normal apical impulse  Abdomen: soft, non-tender; bowel sounds normal; no masses, no organomegaly  Extremities: extremities atraumatic, no cyanosis or edema and Homans sign is negative, no sign of DVT. Capillary Refill: Acceptable < 3 seconds   Peripheral Pulses: +3 easily felt, not easily obliterated with pressures   Skin: Skin color, texture, turgor normal. No rashes or lesions on exposed skin  Neurologic: Neurovascularly intact without any focal sensory/motor deficits. Cranial nerves: II-XII intact, grossly non-focal. Gait was not tested. Mental Status: Alert and oriented, thought content appropriate, normal insight    CBC:   Recent Labs     03/16/21  1315   WBC 6.7   HGB 15.5        BMP:    Recent Labs     03/16/21  1315   *   K 3.8   CL 97*   CO2 22   BUN 9   CREATININE <0.5*   GLUCOSE 111*     Troponin:   Recent Labs     03/16/21  1315   TROPONINI <0.01     PT/INR:  No results found for: PTINR  U/A:    Lab Results   Component Value Date    LEUKOCYTESUR Negative 03/16/2021    RBCUA 10 03/16/2021    SPECGRAV 1.025 03/16/2021    UROBILINOGEN 0.2 03/16/2021    BILIRUBINUR SMALL 03/16/2021    BILIRUBINUR NEGATIVE 03/30/2012    BLOODU TRACE-INTACT 03/16/2021    GLUCOSEU Negative 03/16/2021    GLUCOSEU NEGATIVE 03/30/2012    PROTEINU 30 03/16/2021         RAD:   I have independently reviewed and interpreted the imaging studies below and based my recommendations to the patient on those findings.     Ct Head Wo Contrast    Result Date: 3/16/2021  EXAMINATION: CT OF THE HEAD WITHOUT CONTRAST  3/16/2021 12:54 pm TECHNIQUE: CT of the head was performed without the administration of intravenous contrast. Dose modulation, iterative reconstruction, and/or weight based adjustment of the mA/kV was utilized to reduce the radiation dose to as low as reasonably achievable. COMPARISON: Brain MRI 01/13/2016 HISTORY: ORDERING SYSTEM PROVIDED HISTORY: weakness TECHNOLOGIST PROVIDED HISTORY: Reason for exam:->weakness Has a \"code stroke\" or \"stroke alert\" been called? ->No Decision Support Exception->Emergency Medical Condition (MA) Reason for Exam: weakness Acuity: Acute Type of Exam: Initial FINDINGS: BRAIN/VENTRICLES: 1. Ventricles and sulci: The ventricles and sulci are mildly enlarged for age. 2. Cerebrum: No hemorrhage, mass or acute infarction. 3. White matter: Mild  low-density is seen within the periventricular white matter likely from chronic small vessel ischemic change. 4. Brainstem and cerebellum: Normal for age. ORBITS: The visualized portion of the orbits demonstrate no acute abnormality. SINUSES: The visualized paranasal sinuses demonstrate no acute abnormality. Mastoids are unremarkable. SOFT TISSUES/SKULL:  No acute abnormality of the visualized skull or soft tissues. No acute intracranial abnormality. Xr Chest Portable    Result Date: 3/16/2021  EXAMINATION: ONE XRAY VIEW OF THE CHEST 3/16/2021 12:58 pm COMPARISON: 01/13/2016. HISTORY: ORDERING SYSTEM PROVIDED HISTORY: weakness TECHNOLOGIST PROVIDED HISTORY: Reason for exam:->weakness FINDINGS: The cardiomediastinal silhouette is unremarkable. Aortic vascular calcification. The lungs are clear. No infiltrate, pleural fluid or evidence of overt failure. No acute cardiopulmonary disease. EKG:   Read by ER in the absence of a Cardiologist shows normal sinus rhythm with rate of 72 bpm no ST segment elevation    Assessment:   Principal Problem:    Hypertensive urgency  Active Problems:    Hyperlipidemia    Generalized weakness    Morbid obesity due to excess calories (HCC)    Orthostatic hypotension    Dizziness    Diarrhea    Fall at home  Resolved Problems:    * No resolved hospital problems.  *      Plan:       Hypertensive urgency - Patient reports that she has not taken any of her blood pressure medications today. We will give her her medications, start IV as needed hydralazine and monitor her blood pressure. Diarrhea - likely a viral infection. Stool studes ordered to include C diff, Fecal Leukocytes, Rotavirus Antigen, and bacterial pathogens by PCR. Will monitor and provide supportive care. Orthostatic hypotension, Dizziness - likely secondary to poor intake today along with recurrent episodes of diarrhea. She will be given IV fluids and monitored  - Hold Spironolactone and Lasix    Generalized weakness/Fall at home - Will ask PT/OT to evaluate and treat patient, and if necessary to provide recommendations for post hospital therapy    Hyperlipidemia -takes WelChol at home. Continue  therapy while in the hospital    Morbid obesity due to excess calories (Body mass index is 35.26 kg/m². ) - Complicating assessment and treatment. Placing patient at risk for multiple co-morbidities as well as early death and contributing to the patient's presentation.  on weight loss when appropriate. DVT Prophylaxis: Lovenox  Diet: DIET GENERAL;  Code Status: Full Code  (Advanced care planning has been discussed with patient and/or responsible family member and is reflected in the code status.  Further orders associated with this have been entered if appropriate)    Disposition: Anticipate that patient will remain in the hospital for 2 to 3 days depending on further evaluation and clinical course    Please note that over 50 minutes was spent in evaluating the patient, review of records and results, discussion with staff/family, etc.    Patti Newberry MD

## 2021-03-17 LAB
ANION GAP SERPL CALCULATED.3IONS-SCNC: 10 MMOL/L (ref 3–16)
BASOPHILS ABSOLUTE: 0 K/UL (ref 0–0.2)
BASOPHILS RELATIVE PERCENT: 0.9 %
BUN BLDV-MCNC: 9 MG/DL (ref 7–20)
CALCIUM SERPL-MCNC: 8.5 MG/DL (ref 8.3–10.6)
CHLORIDE BLD-SCNC: 100 MMOL/L (ref 99–110)
CO2: 24 MMOL/L (ref 21–32)
CREAT SERPL-MCNC: <0.5 MG/DL (ref 0.6–1.2)
EOSINOPHILS ABSOLUTE: 0.1 K/UL (ref 0–0.6)
EOSINOPHILS RELATIVE PERCENT: 1.3 %
GFR AFRICAN AMERICAN: >60
GFR NON-AFRICAN AMERICAN: >60
GLUCOSE BLD-MCNC: 97 MG/DL (ref 70–99)
HAV IGM SER IA-ACNC: NORMAL
HCT VFR BLD CALC: 38.6 % (ref 36–48)
HEMOGLOBIN: 13.5 G/DL (ref 12–16)
HEPATITIS B CORE IGM ANTIBODY: NORMAL
HEPATITIS B SURFACE ANTIGEN INTERPRETATION: NORMAL
HEPATITIS C ANTIBODY INTERPRETATION: NORMAL
LYMPHOCYTES ABSOLUTE: 1.2 K/UL (ref 1–5.1)
LYMPHOCYTES RELATIVE PERCENT: 21.9 %
MAGNESIUM: 1.8 MG/DL (ref 1.8–2.4)
MCH RBC QN AUTO: 31.6 PG (ref 26–34)
MCHC RBC AUTO-ENTMCNC: 35.1 G/DL (ref 31–36)
MCV RBC AUTO: 90.3 FL (ref 80–100)
MONOCYTES ABSOLUTE: 0.5 K/UL (ref 0–1.3)
MONOCYTES RELATIVE PERCENT: 8.1 %
NEUTROPHILS ABSOLUTE: 3.8 K/UL (ref 1.7–7.7)
NEUTROPHILS RELATIVE PERCENT: 67.8 %
PDW BLD-RTO: 13.8 % (ref 12.4–15.4)
PLATELET # BLD: 182 K/UL (ref 135–450)
PMV BLD AUTO: 8.7 FL (ref 5–10.5)
POTASSIUM REFLEX MAGNESIUM: 3.2 MMOL/L (ref 3.5–5.1)
RBC # BLD: 4.28 M/UL (ref 4–5.2)
SODIUM BLD-SCNC: 134 MMOL/L (ref 136–145)
T4 FREE: 1.3 NG/DL (ref 0.9–1.8)
TSH SERPL DL<=0.05 MIU/L-ACNC: 2.41 UIU/ML (ref 0.27–4.2)
WBC # BLD: 5.6 K/UL (ref 4–11)

## 2021-03-17 PROCEDURE — 84439 ASSAY OF FREE THYROXINE: CPT

## 2021-03-17 PROCEDURE — 51798 US URINE CAPACITY MEASURE: CPT

## 2021-03-17 PROCEDURE — 83835 ASSAY OF METANEPHRINES: CPT

## 2021-03-17 PROCEDURE — 85025 COMPLETE CBC W/AUTO DIFF WBC: CPT

## 2021-03-17 PROCEDURE — 80048 BASIC METABOLIC PNL TOTAL CA: CPT

## 2021-03-17 PROCEDURE — 6360000002 HC RX W HCPCS: Performed by: INTERNAL MEDICINE

## 2021-03-17 PROCEDURE — 2060000000 HC ICU INTERMEDIATE R&B

## 2021-03-17 PROCEDURE — 6370000000 HC RX 637 (ALT 250 FOR IP): Performed by: INTERNAL MEDICINE

## 2021-03-17 PROCEDURE — 2580000003 HC RX 258: Performed by: INTERNAL MEDICINE

## 2021-03-17 PROCEDURE — 84443 ASSAY THYROID STIM HORMONE: CPT

## 2021-03-17 PROCEDURE — 83497 ASSAY OF 5-HIAA: CPT

## 2021-03-17 PROCEDURE — 36415 COLL VENOUS BLD VENIPUNCTURE: CPT

## 2021-03-17 PROCEDURE — 80074 ACUTE HEPATITIS PANEL: CPT

## 2021-03-17 PROCEDURE — 83735 ASSAY OF MAGNESIUM: CPT

## 2021-03-17 PROCEDURE — 84244 ASSAY OF RENIN: CPT

## 2021-03-17 PROCEDURE — 82088 ASSAY OF ALDOSTERONE: CPT

## 2021-03-17 RX ORDER — HYDRALAZINE HYDROCHLORIDE 25 MG/1
25 TABLET, FILM COATED ORAL EVERY 8 HOURS PRN
Status: DISCONTINUED | OUTPATIENT
Start: 2021-03-17 | End: 2021-03-18

## 2021-03-17 RX ORDER — SPIRONOLACTONE 25 MG/1
25 TABLET ORAL DAILY
Status: DISCONTINUED | OUTPATIENT
Start: 2021-03-17 | End: 2021-03-20 | Stop reason: HOSPADM

## 2021-03-17 RX ORDER — POTASSIUM CHLORIDE 20 MEQ/1
40 TABLET, EXTENDED RELEASE ORAL ONCE
Status: COMPLETED | OUTPATIENT
Start: 2021-03-17 | End: 2021-03-17

## 2021-03-17 RX ORDER — BACITRACIN, NEOMYCIN, POLYMYXIN B 400; 3.5; 5 [USP'U]/G; MG/G; [USP'U]/G
OINTMENT TOPICAL 2 TIMES DAILY PRN
Status: DISCONTINUED | OUTPATIENT
Start: 2021-03-17 | End: 2021-03-20 | Stop reason: HOSPADM

## 2021-03-17 RX ADMIN — SODIUM CHLORIDE, PRESERVATIVE FREE 10 ML: 5 INJECTION INTRAVENOUS at 07:44

## 2021-03-17 RX ADMIN — LOSARTAN POTASSIUM 100 MG: 25 TABLET, FILM COATED ORAL at 07:44

## 2021-03-17 RX ADMIN — ENOXAPARIN SODIUM 40 MG: 40 INJECTION SUBCUTANEOUS at 21:45

## 2021-03-17 RX ADMIN — ASPIRIN 81 MG: 81 TABLET, CHEWABLE ORAL at 07:43

## 2021-03-17 RX ADMIN — SPIRONOLACTONE 25 MG: 25 TABLET ORAL at 16:56

## 2021-03-17 RX ADMIN — CARVEDILOL 25 MG: 6.25 TABLET, FILM COATED ORAL at 16:56

## 2021-03-17 RX ADMIN — SODIUM CHLORIDE: 9 INJECTION, SOLUTION INTRAVENOUS at 16:56

## 2021-03-17 RX ADMIN — POTASSIUM CHLORIDE 40 MEQ: 1500 TABLET, EXTENDED RELEASE ORAL at 10:50

## 2021-03-17 RX ADMIN — CHOLESTYRAMINE 4 G: 4 POWDER, FOR SUSPENSION ORAL at 10:50

## 2021-03-17 RX ADMIN — SODIUM CHLORIDE: 9 INJECTION, SOLUTION INTRAVENOUS at 04:29

## 2021-03-17 RX ADMIN — CARVEDILOL 25 MG: 6.25 TABLET, FILM COATED ORAL at 07:44

## 2021-03-17 RX ADMIN — HYDRALAZINE HYDROCHLORIDE 10 MG: 20 INJECTION INTRAMUSCULAR; INTRAVENOUS at 04:30

## 2021-03-17 ASSESSMENT — PAIN SCALES - WONG BAKER
WONGBAKER_NUMERICALRESPONSE: 0

## 2021-03-17 ASSESSMENT — PAIN SCALES - GENERAL: PAINLEVEL_OUTOF10: 0

## 2021-03-17 NOTE — FLOWSHEET NOTE
Orthostatic Blood pressure and pulse:       03/17/21 0915   Vital Signs   /64   MAP (mmHg) 88   Blood Pressure Lying 121/64   Pulse Lying 77 PER MINUTE   Blood Pressure Sitting 110/66   Pulse Sitting 79 PER MINUTE   Blood Pressure Standing 88/54   Pulse Standing 88 PER MINUTE   Level of Consciousness Alert (0)       Electronically signed by Migdalia Welsh RN on 3/17/2021 at 9:34 AM

## 2021-03-17 NOTE — PROGRESS NOTES
Hospitalist Progress Note      PCP: Basia Golden MD    Date of Admission: 3/16/2021    Chief Complaint: dizziness, fall    Hospital Course: Pt is an 68y.o. year-old female with a history of hypertension and morbid obesity who presents to the emergency room for evaluation after falling at home. He reports a several day history of feeling weak and feverish. She reports having temperatures between 99.5 and 99.8 when she checked, but states that those are high temperatures for her. This morning she had 4 episodes of diarrhea. Following the last episode she was attempting to get off of the toilet when she fell forward. She did not sustain any injury, and did not lose consciousness. In the emergency room she was found to have orthostatic hypotension along with hypertensive urgency. She states that she has not taken any of her blood pressure medications today. Associated signs and symptoms do not include nausea, vomiting, abdominal pain, hemoptysis, hematochezia, constipation or urinary symptoms. Subjective: Pt seen and examined. Feels better today, but got dizzy when transferring from sitting to standing. Feels that her feet are warm.        Medications:  Reviewed    Infusion Medications    sodium chloride 75 mL/hr at 03/17/21 0429     Scheduled Medications    losartan  100 mg Oral Daily    cholestyramine light  4 g Oral Daily    carvedilol  25 mg Oral BID WC    aspirin  81 mg Oral Daily    sodium chloride flush  10 mL Intravenous 2 times per day    enoxaparin  40 mg Subcutaneous Nightly    cloNIDine  0.1 mg Oral Once     PRN Meds: sodium chloride flush, traMADol, ondansetron, polyethylene glycol, acetaminophen **OR** acetaminophen      Intake/Output Summary (Last 24 hours) at 3/17/2021 1052  Last data filed at 3/17/2021 0824  Gross per 24 hour   Intake 1320 ml   Output 850 ml   Net 470 ml       Exam:    /64   Pulse 77   Temp 98.4 °F (36.9 °C) (Oral)   Resp 18   Ht 5' 6\" (1.676 m) Wt 213 lb 6.5 oz (96.8 kg)   SpO2 98%   BMI 34.44 kg/m²     General appearance: No apparent distress, appears stated age and cooperative. HEENT: Pupils equal, round, and reactive to light. Conjunctivae/corneas clear. Neck: Supple, with full range of motion. No jugular venous distention. Trachea midline. Respiratory:  Normal respiratory effort. Clear to auscultation, bilaterally without Rales/Wheezes/Rhonchi. Cardiovascular: Regular rate and rhythm with normal S1/S2 without murmurs, rubs or gallops. Abdomen: Soft, non-tender, non-distended with normal bowel sounds. Musculoskeletal: No clubbing, cyanosis or edema bilaterally. Full range of motion without deformity. Skin: Skin color, texture, turgor normal.  No rashes or lesions. Neurologic:  Neurovascularly intact without any focal sensory/motor deficits. Cranial nerves: II-XII intact, grossly non-focal.  Psychiatric: Alert and oriented, thought content appropriate, normal insight  Capillary Refill: Brisk,< 3 seconds   Peripheral Pulses: +2 palpable, equal bilaterally       Labs:   Recent Labs     03/16/21  1315 03/17/21  0614   WBC 6.7 5.6   HGB 15.5 13.5   HCT 43.9 38.6    182     Recent Labs     03/16/21  1315 03/17/21  0614   * 134*   K 3.8 3.2*   CL 97* 100   CO2 22 24   BUN 9 9   CREATININE <0.5* <0.5*   CALCIUM 9.5 8.5     Recent Labs     03/16/21  1315   AST 19   ALT 26   BILIDIR 0.4*   BILITOT 2.5*   ALKPHOS 82     No results for input(s): INR in the last 72 hours. Recent Labs     03/16/21  1315   TROPONINI <0.01       Urinalysis:      Lab Results   Component Value Date    NITRU Negative 03/16/2021    WBCUA 5 03/16/2021    BACTERIA 1+ 03/16/2021    RBCUA 10 03/16/2021    BLOODU TRACE-INTACT 03/16/2021    SPECGRAV 1.025 03/16/2021    GLUCOSEU Negative 03/16/2021    GLUCOSEU NEGATIVE 03/30/2012       Radiology:  CT HEAD WO CONTRAST   Final Result   No acute intracranial abnormality.          XR CHEST PORTABLE   Final Result   No acute cardiopulmonary disease. Assessment/Plan:    Active Hospital Problems    Diagnosis Date Noted    Hypertensive urgency [I16.0] 03/16/2021    Orthostatic hypotension [I95.1] 03/16/2021    Dizziness [R42] 03/16/2021    Diarrhea [R19.7] 03/16/2021    Fall at home [A97. Emily Arnold, V87.036] 03/16/2021    Morbid obesity due to excess calories ([facility] [address] [E66.01]     Generalized weakness [R53.1] 05/23/2011    Hyperlipidemia [E78.5] 08/11/2010       Hypertensive urgency - Patient reports that she has not taken any of her blood pressure medications today. Resume losartan and Coreg. Hold Aldactone and Lasix for now. Stop IV hydralazine.  -check renin/aldosterone, morning cortisol, 5-HIAA, metanephrines     Diarrhea - likely a viral infection. Stool studes ordered to include C diff, Fecal Leukocytes, Rotavirus Antigen, and bacterial pathogens by PCR. Will monitor and provide supportive care.      Orthostatic hypotension, Dizziness - likely secondary to poor intake today along with recurrent episodes of diarrhea. She will be given IV fluids and monitored  - Hold Spironolactone and Lasix  - ACE wrap lower extremities  - check morning cortisol, TSH, free T4    Unconjugated hyperbilirubinemia - possible 2/2 Gilbert syndrome or hyperthyroidism  -check TSH, free T4     Generalized weakness/Fall at home - Will ask PT/OT to evaluate and treat patient, and if necessary to provide recommendations for post hospital therapy     Hyperlipidemia -takes WelChol at home. Continue  therapy while in the hospital     Morbid obesity due to excess calories (Body mass index is 35.26 kg/m². ) - Complicating assessment and treatment. Placing patient at risk for multiple co-morbidities as well as early death and contributing to the patient's presentation.  on weight loss when appropriate.       DVT Prophylaxis: Lovenox  Diet: DIET GENERAL;  Code Status: Full Code    PT/OT Eval Status: defer    Dispo - continue care    [employer] Enma Weiss MD

## 2021-03-17 NOTE — CARE COORDINATION
INITIAL CASE MANAGEMENT ASSESSMENT     Reviewed chart, spoke with patient to assess possible discharge needs. Explained Case Management role/services. Living Situation: Patient verified home address. Patient lives with spouse. ADLs: Independent      DME: 13year old walker   PATIENT IDENTIFIED: possible needs for new Walker and Shower Chair. Would need PT/OT eval for recommendation     PT/OT Recs: Not ordered at this time. Active Services: No active services     Transportation: Spouse to transport     Med[facility] on [address] and [address] [place] [address]     PCP: Neil Caldwell MD      PLAN/COMMENTS: Patient plans to return home with spouse at discharge. DME needs identified by patient, and SW notified RN. SW/CM provided contact information for patient or family to call with any questions. SW/CM will follow and assist as needed.     MORGAN West, [place], Social Work  [phone]  Electronically signed by MORGAN West, LSW on 3/17/2021 at 4:24 PM

## 2021-03-18 LAB
ANION GAP SERPL CALCULATED.3IONS-SCNC: 7 MMOL/L (ref 3–16)
BASOPHILS ABSOLUTE: 0 K/UL (ref 0–0.2)
BASOPHILS RELATIVE PERCENT: 1.1 %
BUN BLDV-MCNC: 8 MG/DL (ref 7–20)
CALCIUM SERPL-MCNC: 8.3 MG/DL (ref 8.3–10.6)
CHLORIDE BLD-SCNC: 102 MMOL/L (ref 99–110)
CO2: 23 MMOL/L (ref 21–32)
CORTISOL - AM: 9.9 UG/DL (ref 4.3–22.4)
CREAT SERPL-MCNC: <0.5 MG/DL (ref 0.6–1.2)
EOSINOPHILS ABSOLUTE: 0.1 K/UL (ref 0–0.6)
EOSINOPHILS RELATIVE PERCENT: 2.9 %
GFR AFRICAN AMERICAN: >60
GFR NON-AFRICAN AMERICAN: >60
GLUCOSE BLD-MCNC: 100 MG/DL (ref 70–99)
HCT VFR BLD CALC: 37.5 % (ref 36–48)
HEMOGLOBIN: 13.1 G/DL (ref 12–16)
LYMPHOCYTES ABSOLUTE: 1.1 K/UL (ref 1–5.1)
LYMPHOCYTES RELATIVE PERCENT: 23.1 %
MAGNESIUM: 1.9 MG/DL (ref 1.8–2.4)
MCH RBC QN AUTO: 31.9 PG (ref 26–34)
MCHC RBC AUTO-ENTMCNC: 35 G/DL (ref 31–36)
MCV RBC AUTO: 91 FL (ref 80–100)
MONOCYTES ABSOLUTE: 0.4 K/UL (ref 0–1.3)
MONOCYTES RELATIVE PERCENT: 8.8 %
NEUTROPHILS ABSOLUTE: 2.9 K/UL (ref 1.7–7.7)
NEUTROPHILS RELATIVE PERCENT: 64.1 %
PDW BLD-RTO: 14.2 % (ref 12.4–15.4)
PLATELET # BLD: 159 K/UL (ref 135–450)
PMV BLD AUTO: 9.2 FL (ref 5–10.5)
POTASSIUM REFLEX MAGNESIUM: 3.4 MMOL/L (ref 3.5–5.1)
RBC # BLD: 4.12 M/UL (ref 4–5.2)
SODIUM BLD-SCNC: 132 MMOL/L (ref 136–145)
WBC # BLD: 4.6 K/UL (ref 4–11)

## 2021-03-18 PROCEDURE — 6370000000 HC RX 637 (ALT 250 FOR IP): Performed by: INTERNAL MEDICINE

## 2021-03-18 PROCEDURE — 97530 THERAPEUTIC ACTIVITIES: CPT

## 2021-03-18 PROCEDURE — 83735 ASSAY OF MAGNESIUM: CPT

## 2021-03-18 PROCEDURE — 85025 COMPLETE CBC W/AUTO DIFF WBC: CPT

## 2021-03-18 PROCEDURE — 36415 COLL VENOUS BLD VENIPUNCTURE: CPT

## 2021-03-18 PROCEDURE — 6360000002 HC RX W HCPCS: Performed by: INTERNAL MEDICINE

## 2021-03-18 PROCEDURE — 97116 GAIT TRAINING THERAPY: CPT

## 2021-03-18 PROCEDURE — 2060000000 HC ICU INTERMEDIATE R&B

## 2021-03-18 PROCEDURE — 94760 N-INVAS EAR/PLS OXIMETRY 1: CPT

## 2021-03-18 PROCEDURE — 97162 PT EVAL MOD COMPLEX 30 MIN: CPT

## 2021-03-18 PROCEDURE — 97165 OT EVAL LOW COMPLEX 30 MIN: CPT

## 2021-03-18 PROCEDURE — 82533 TOTAL CORTISOL: CPT

## 2021-03-18 PROCEDURE — 97110 THERAPEUTIC EXERCISES: CPT

## 2021-03-18 PROCEDURE — 80048 BASIC METABOLIC PNL TOTAL CA: CPT

## 2021-03-18 PROCEDURE — 2580000003 HC RX 258: Performed by: INTERNAL MEDICINE

## 2021-03-18 RX ORDER — POTASSIUM CHLORIDE 20 MEQ/1
40 TABLET, EXTENDED RELEASE ORAL ONCE
Status: COMPLETED | OUTPATIENT
Start: 2021-03-18 | End: 2021-03-18

## 2021-03-18 RX ORDER — HYDRALAZINE HYDROCHLORIDE 25 MG/1
25 TABLET, FILM COATED ORAL EVERY 8 HOURS SCHEDULED
Status: DISCONTINUED | OUTPATIENT
Start: 2021-03-18 | End: 2021-03-19

## 2021-03-18 RX ADMIN — CARVEDILOL 25 MG: 6.25 TABLET, FILM COATED ORAL at 07:39

## 2021-03-18 RX ADMIN — ASPIRIN 81 MG: 81 TABLET, CHEWABLE ORAL at 07:39

## 2021-03-18 RX ADMIN — ENOXAPARIN SODIUM 40 MG: 40 INJECTION SUBCUTANEOUS at 21:47

## 2021-03-18 RX ADMIN — SPIRONOLACTONE 25 MG: 25 TABLET ORAL at 07:39

## 2021-03-18 RX ADMIN — CHOLESTYRAMINE 4 G: 4 POWDER, FOR SUSPENSION ORAL at 10:09

## 2021-03-18 RX ADMIN — HYDRALAZINE HYDROCHLORIDE 25 MG: 25 TABLET, FILM COATED ORAL at 21:47

## 2021-03-18 RX ADMIN — CARVEDILOL 25 MG: 6.25 TABLET, FILM COATED ORAL at 16:10

## 2021-03-18 RX ADMIN — SODIUM CHLORIDE: 9 INJECTION, SOLUTION INTRAVENOUS at 05:57

## 2021-03-18 RX ADMIN — LOSARTAN POTASSIUM 100 MG: 25 TABLET, FILM COATED ORAL at 07:39

## 2021-03-18 RX ADMIN — POTASSIUM CHLORIDE 40 MEQ: 1500 TABLET, EXTENDED RELEASE ORAL at 10:09

## 2021-03-18 RX ADMIN — HYDRALAZINE HYDROCHLORIDE 25 MG: 25 TABLET, FILM COATED ORAL at 03:33

## 2021-03-18 ASSESSMENT — PAIN SCALES - GENERAL
PAINLEVEL_OUTOF10: 0

## 2021-03-18 ASSESSMENT — PAIN SCALES - WONG BAKER
WONGBAKER_NUMERICALRESPONSE: 0

## 2021-03-18 NOTE — PROGRESS NOTES
Physical Therapy    Facility/Department: [address] PROGRESSIVE CARE  Initial Assessment    NAME: Ramandeep Franco  : 1940  MRN: [id number]    Date of Service: 3/18/2021    Discharge Recommendations:  Patient would benefit from continued therapy after discharge, Home with Home health PT, S Level 1   PT Equipment Recommendations  Equipment Needed: Yes  Mobility Devices: Justin Stake: Oscar Franco scored a 18/24 on the AM-PAC short mobility form. Current research shows that an AM-PAC score of 18 or greater is typically associated with a discharge to the patient's home setting. Based on the patient's AM-PAC score and their current functional mobility deficits, it is recommended that the patient have 2-3 sessions per week of Physical Therapy at d/c to increase the patient's independence. At this time, this patient demonstrates the endurance and safety to discharge home with home services and a follow up treatment frequency of 2-3x/wk. Please see assessment section for further patient specific details. If patient discharges prior to next session this note will serve as a discharge summary. Please see below for the latest assessment towards goals. Assessment   Body structures, Functions, Activity limitations: Decreased functional mobility ; Decreased endurance;Decreased balance  Assessment: Pt presents today moderately below prior level of I in hiome with family assist, not using an AD. Today pt presents with decreasing BP upon sitting and standing, mild symptoms that limit walking distance but pt was able to get up to chair. Bed mobilitry and transfers were CGA, ot was able to amb only a few steps with wh walker CGA. Anticipate pt will be safe to go home with family support and home PT as BP issues resolve.   Treatment Diagnosis: decreased functional mobility following orthostatic BP  Specific instructions for Next Treatment: increase gt as able  Prognosis: Good  Decision Making: Medium Complexity  History: see above  Exam: see above  Clinical Presentation: evolving  Barriers to Learning: none noted  REQUIRES PT FOLLOW UP: Yes  Activity Tolerance  Activity Tolerance: Patient limited by fatigue;Patient limited by endurance       Patient Diagnosis(es): The primary encounter diagnosis was Orthostatic hypotension. Diagnoses of Dizziness, General weakness, and Fever, unspecified fever cause were also pertinent to this visit. has a past medical history of Arthritis, Cataract, High blood pressure, Hyperlipidemia, and stress test.   has a past surgical history that includes joint replacement and eye surgery (1985). Restrictions     Vision/Hearing  Vision: Impaired(wears one contact, does not have it with her)  Vision Exceptions: (wears one contact due to eye injury)  Hearing: Within functional limits     Subjective  General  Chart Reviewed: Yes  Additional Pertinent Hx: Pt admitted with dizziness and falls, orthostatic hypotension  Response To Previous Treatment: Not applicable  Family / Caregiver Present: No  Referring Practitioner: Mounika Tejeda  Referral Date : 03/17/21  Diagnosis: with dizziness and falls, orthostatic hypotension  Follows Commands: Within Functional Limits  General Comment  Comments: Pt in bed, willing to get up and wants to go to the bathroom but is afraid she will get dizzy.   Subjective  Subjective: denies c/o in bed  Pain Screening  Patient Currently in Pain: Denies  Vital Signs  Patient Currently in Pain: Denies       Orientation  Orientation  Overall Orientation Status: Within Normal Limits  Social/Functional History  Social/Functional History  Lives With: Spouse  Type of Home: House  Home Layout: Laundry in basement( does laundry)  Home Access: Stairs to enter with rails  Entrance Stairs - Number of Steps: 1 + 3 to go in from the back  Entrance Stairs - Rails: Right  Bathroom Shower/Tub: Walk-in shower  Bathroom Toilet: Standard  Bathroom Equipment: Grab bars around toilet  Bathroom Accessibility: Accessible  Home Equipment: Standard walker(old walker, did not use, was from family)  [address] Help From: Family  ADL Assistance: Independent  Homemaking Assistance: Independent  Homemaking Responsibilities: (pt shares cleaning,  cooks, pt sorts laundry,  washes, pt folds)  Ambulation Assistance: Independent  Transfer Assistance: Independent  Active : Yes  Occupation: Retired  Type of occupation: data processing  Cognition   Cognition  Overall Cognitive Status: WFL    Objective          AROM RLE (degrees)  RLE AROM: WFL  AROM LLE (degrees)  LLE AROM : WFL  Strength RLE  Strength RLE: WFL  Strength LLE  Strength LLE: WFL  Tone RLE  RLE Tone: Normotonic  Tone LLE  LLE Tone: Normotonic  Motor Control  Gross Motor?: WNL  Sensation  Overall Sensation Status: WFL(lt touch)  Bed mobility  Rolling to Left: Stand by assistance  Supine to Sit: Contact guard assistance  Sit to Supine: Stand by assistance  Scooting: Stand by assistance  Transfers  Sit to Stand: Contact guard assistance  Stand to sit: Contact guard assistance  Bed to Chair: Contact guard assistance(with wh walker)  Comment: pt requested to use commode, toileting mod A with clothing, transfer CGA  Ambulation  Ambulation?: Yes  More Ambulation?: No  Ambulation 1  Surface: level tile  Device: Rolling Walker  Assistance: Contact guard assistance  Quality of Gait: slow pace, small steps  Gait Deviations: Slow Viviana;Decreased step length;Decreased step height  Distance: took a few steps bed to commode to bed, then bed to recliner with wh walker, standing BP = 104/62 and pt feels weak and a little dizzy     Balance  Posture: Fair  Sitting - Static: Good  Sitting - Dynamic: Good  Standing - Static: Fair  Standing - Dynamic: [address]  Times per week: 3 to 5  Specific instructions for Next Treatment: increase gt as able  Current Treatment Recommendations: Transfer Training, Gait Training, Endurance Training  Safety Devices  Type of devices: All fall risk precautions in place, Call light within reach, Gait belt, Left in chair, Nurse notified(no chair alarm present, RN in room and aware and reports she will obtain an alarm)    AM-PAC Score  AM-PAC Inpatient Mobility Raw Score : 18 (03/18/21 1043)  AM-PAC Inpatient T-Scale Score : 43.63 (03/18/21 1043)  Mobility Inpatient CMS 0-100% Score: 46.58 (03/18/21 1043)  Mobility Inpatient CMS G-Code Modifier : CK (03/18/21 1043)     Goals  Short term goals  Time Frame for Short term goals: 3 to 5 days  Short term goal 1: all trasnfers S to modif I  Short term goal 2: amb 48' with wh walker S to modif I  Short term goal 3: standing LE ex without dizziness  Patient Goals   Patient goals : go home, walk without feeling dizzy       Therapy Time   Individual Concurrent Group Co-treatment   Time In 0900         Time Out 1000         Minutes 60         Timed Code Treatment Minutes: 60 Minutes     If pt is discharged before subsequent therapy sessions, this note will serve as the discharge summary.     Cristofer Marie, [address]

## 2021-03-18 NOTE — PROGRESS NOTES
Occupational Therapy   Occupational Therapy Initial Assessment and Tentative D/C    This note to serve as d/c summary should pt d/c prior to next session. Date: 3/18/2021   Patient Name: Ag Bangura  MRN: [id number]     : 1940    Date of Service: 3/18/2021    Discharge Recommendations: Ag Bangura scored a 21/24 on the AM-PAC ADL Inpatient form. At this time, no further OT is recommended upon discharge due to anticipate pt safe to return home with assist from family. Recommend patient returns to prior setting with prior services. Continue to assess pending progress, 24 hour supervision or assist  OT Equipment Recommendations  Other: possible shower chair pending progress    Assessment   Performance deficits / Impairments: Decreased functional mobility ; Decreased endurance;Decreased ADL status; Decreased high-level IADLs;Decreased balance  Assessment: Ag Bangura is a 68 y.o. female who indicates that she has not been feeling as well for the last several days. She states that she first noticed that she was feeling feverish last Wednesday evening. She states that every evening since then she has felt feverish all night long with sweats and chills and temperatures between 99.5 and 99.8 when she has checked them. She states that she really has not had a lot of other symptoms along with those elevated temperatures. She denies any cough or congestion. No abdominal pain or difficult eating or drinking. No urinary discomfort or increased urination or blood in urine. PTA pt from home with  where pt was Ind with mobility and ADLs. Pt currently limited this date due to decreasing BP with position change. Pt asymptomatic throughout session. Pt able to complete bed mobility with SBA. Pt completes transfers with SBA/CGA and use of RW. Anticipate pt needing up to CGA/Min A for ADLs. Pt will benefit from skilled OT services at this time.  Anticipate pt safe to return home with assist from family and progression in therapy. Prognosis: Good  Decision Making: Low Complexity  Exam: see above  Assistance / Modification: RW  OT Education: Plan of Care;Transfer Training;OT Role  REQUIRES OT FOLLOW UP: Yes  Activity Tolerance  Activity Tolerance: Patient Tolerated treatment well;Treatment limited secondary to medical complications (free text)  Activity Tolerance: BP seated 165/76, in stance 139/68; pt asymptomatic  Safety Devices  Safety Devices in place: Yes  Type of devices: Bed alarm in place; Left in bed;Nurse notified;Call light within reach           Patient Diagnosis(es): The primary encounter diagnosis was Orthostatic hypotension. Diagnoses of Dizziness, General weakness, and Fever, unspecified fever cause were also pertinent to this visit. has a past medical history of Arthritis, Cataract, High blood pressure, Hyperlipidemia, and stress test.   has a past surgical history that includes joint replacement and eye surgery (1985). Restrictions  Restrictions/Precautions  Restrictions/Precautions: Fall Risk    Subjective   General  Chart Reviewed: Yes  Patient assessed for rehabilitation services?: Yes  Additional Pertinent Hx: per ED note, Gustabo Beckwith is a 68 y.o. female who indicates that she has not been feeling as well for the last several days. She states that she first noticed that she was feeling feverish last Wednesday evening. She states that every evening since then she has felt feverish all night long with sweats and chills and temperatures between 99.5 and 99.8 when she has checked them. She states that she really has not had a lot of other symptoms along with those elevated temperatures. She denies any cough or congestion. No abdominal pain or difficult eating or drinking. No urinary discomfort or increased urination or blood in urine. Family / Caregiver Present: No  Referring Practitioner: Sage Ha MD  Subjective  Subjective: Pt agreeable to OT evaluation.  Pt with no reports of pain. Pt reports feeling better. General Comment  Comments: okay for therapy per RN.   Vital Signs  Blood Pressure Lying: 160/66  Pulse Lyin PER MINUTE  Blood Pressure Sittin/76  Pulse Sittin PER MINUTE  Blood Pressure Standin/68  Pulse Standin PER MINUTE  Level of Consciousness: Alert (0)  Social/Functional History  Social/Functional History  Lives With: Spouse  Type of Home: House  Home Layout: Laundry in basement( does laundry)  Home Access: Stairs to enter with rails  Entrance Stairs - Number of Steps: 1 + 3 to go in from the back  Entrance Stairs - Rails: Right  Bathroom Shower/Tub: Walk-in shower  Bathroom Toilet: Standard  Bathroom Equipment: Grab bars around toilet  Bathroom Accessibility: Accessible  Home Equipment: Standard walker(old walker, did not use, was from family)  Receives Help From: Family  ADL Assistance: [address] [place] [address] Avenue: Independent  Homemaking Responsibilities: (pt shares cleaning,  cooks, pt sorts laundry,  washes, pt folds)  Ambulation Assistance: Independent  Transfer Assistance: Independent  Active : Yes  Occupation: Retired  Type of occupation: data processing       Objective   Vision: Impaired(wears one contact, does not have it with her)  Vision Exceptions: (wears one contact due to eye injury)  Hearing: Within functional limits    Orientation  Overall Orientation Status: Within Functional Limits     Balance  Sitting Balance: Supervision  Standing Balance: Contact guard assistance(SBA/CGA; RW)  Functional Mobility  Functional - Mobility Device: Rolling Walker  Activity: Other(small steps bed to commode; ~4ft)  Assist Level: Contact guard assistance  Functional Mobility Comments: no LOB; no reports of light headedness or dizziness  Toilet Transfers  Toilet - Technique: Ambulating(RW)  Equipment Used: Standard bedside commode  Toilet Transfer: Stand by assistance;Contact guard assistance  Wheelchair Bed complete grooming in stance at sink [address] term goals  Time Frame for Long term goals : STG=LTG  Patient Goals   Patient goals : return home       Therapy Time   Individual Concurrent Group Co-treatment   Time In 1345         Time Out 1420         Minutes 35         Timed Code Treatment Minutes: 20 Minutes(15 minute eval)       Jessica Alcala OTR/L

## 2021-03-18 NOTE — PROGRESS NOTES
Physician Progress Note      PATIENT:               Alvina Lopez  CSN #:                  [id number]  :                       1940  ADMIT DATE:       3/16/2021 12:21 PM  [address] DATE:  RESPONDING  PROVIDER #:        Garlin Cranker MD          QUERY TEXT:    Pt admitted with diarrhea, weakness, low grade temp. Per progress notes,   viral infection suspected. If possible, please document in the progress notes   and discharge summary if you are evaluating and /or treating any of the   following: The medical record reflects the following:  Risk Factors: 69 yo, current admission for diarrhea  Clinical Indicators: diarrhea, generalized weakness, low grade temp, poor   appetite  Treatment: IV fluids, stool studies pending    Thank you,  Mckenzie Deng RN, BSN, CCDS  [email or web address]  Options provided:  -- Viral gastroenteritis  -- Non infectious gastroenteritis  -- Other - I will add my own diagnosis  -- Disagree - Not applicable / Not valid  -- Disagree - Clinically unable to determine / Unknown  -- Refer to Clinical Documentation Reviewer    PROVIDER RESPONSE TEXT:    This patient has viral gastroenteritis.     Query created by: Wilbur Cheadle on 3/18/2021 12:45 PM      Electronically signed by:  Garlin Cranker MD 3/18/2021 5:21 PM

## 2021-03-18 NOTE — FLOWSHEET NOTE
Orthostatic Vitals:          03/18/21 1400   Vital Signs   Blood Pressure Lying 160/66   Pulse Lying 85 PER MINUTE   Blood Pressure Sitting 165/76   Pulse Sitting 80 PER MINUTE   Blood Pressure Standing 139/68   Pulse Standing 88 PER MINUTE   Level of Consciousness Alert (0)

## 2021-03-18 NOTE — PLAN OF CARE
Problem: Falls - Risk of:  Goal: Will remain free from falls  Description: Will remain free from falls  3/18/2021 1633 by Jana Nuno RN  Outcome: Ongoing   Fall risk assessment completed . Fall precautions in place, bed/ chair alarm on, side rails 2/4 up, call light in reach, educated pt on calling for assistance when needed, room clear of clutter. Pt verbalized understanding. Problem: Infection:  Goal: Will remain free from infection  Description: Will remain free from infection  3/18/2021 1633 by Jana Nuno RN  Outcome: Ongoing     Problem: Safety:  Goal: Free from intentional harm  Description: Free from intentional harm  3/18/2021 1633 by Jana Nuno RN  Outcome: Ongoing     Problem: Pain:  Goal: Patient's pain/discomfort is manageable  Description: Patient's pain/discomfort is manageable  3/18/2021 1633 by Jana Nuno RN  Outcome: Ongoing   Pain/discomfort being managed with PRN analgesics per MD orders. Pt able to express presence and absence of pain and rate pain appropriately using numerical scale. Problem: Skin Integrity:  Goal: Will show no infection signs and symptoms  Description: Will show no infection signs and symptoms  3/18/2021 1633 by Jana Nuno RN  Outcome: Ongoing   Skin assessment completed every shift. Pt assessed for incontinence, appropriate barrier cream applied prn. Pt encouraged to turn/rotate every 2 hours. Assistance provided if pt unable to do so themselves.

## 2021-03-18 NOTE — CONSULTS
[address] [place] [address] Nephrology   [facility] [employer]  [phone]  Nephrology Consult Note          Patient ID: Isaura Lynn  Referring/ Physician: Radha Dodd MD      Assessment/Plan:   Isaura Lynn is being seen by nephrology for HTN with lability. #Labile hypertension:   Home BP 131O systolic   Here she was 198/35 and then dropeed to 88/54 with standing. Pulse did not change but she is on BB  Clinically not hypovolemic. She had not been taking the lasix. Had a fall at home with dizziness prior probably due to orthostatic hypotension. Its a new problem. Was not associated with pallor, palpitation or anxiety. Has previously had normal kidney function. Hypokalemia noted here but has not had previously. Marcos and renin in process but this will not    # positive orthostatic vitals. Lasix held, still on aldactone. On NS  Previousl Echo. G1DD 60-65%   AM cortisol 9.9   TSH normal  No UTI     #euvolemic, no edema. Plan:   - would hold all diuretics given the orthostatic hypotension and lack of edema. - marcos and renin sent but this will not be accurate on aldactone. Will have to be repeated with aldactone off as an outpatient. - will follow up urine and plasma metanephrines. - SSRIs can cause or exacerbate orthostatic hypotension. Would hold this drug and leave off. She did not have these issues prior to starting lexapro  - her orthostatic vitals have been positive so agree with IVFs. [facility] would like to thank you for the opportunity to serve this patient. Please call with any questions or concerns. Ankur Peterson MD  [facility]  [address]  Fax: [phone]  Office: [phone]         CC/Reason for consult:   Reason for consult: orthostatic hypotension and labile BP. Chief Complaint   Patient presents with    Fever     pt complains of feeling hot 6 days ago.  Fall     diarrhea x 4 today. pt fell after leaving bathroom. HPI/Hospital Course:   Jesus Lee is a 68 y.o. female presented to the hospital on   3/16/2021 with fall. Has a h/o  has a past medical history of Arthritis, Cataract, High blood pressure, Hyperlipidemia, and stress test..     Went to see Dr. Veena Charles for depression. She was started on lexapro on Monday of last week. By Wednesday Thursday she felt she had a fever but did not measure it. She took tylenol for the subjective fever. She was told to stop taking it. Then she resumed it after talking to her doctor. She went to the bathroom and felt \"woozy\" and fell. She then had stool incontinence. Did not hit her head. Nothing like that this has happened. 60 years of high BP. Its usually high in office visits but was told that her number was ok for her. At home last was 140 /65. Has had lightheadedness with standing about 2 times per month. No issues with leg swelling. No DM. No other new medications. Antacid over the counter    Quite sodas 4 years ago. Does not drink alcohol regularly. No recent NSADs, tylenol only. No cough medicine. No antihistamines. No UTIs sxs  Drinks a lot of water  No LUTS no dysuria no hematuria. Review of Systems:   [address] All other remaining systems are negative. Constitutional:  fever, chills, weakness, weight change, fatigue,      Skin:  rash, pruritus, hair loss, bruising, dry skin, petechiae. Head, Face, Neck   headaches, swelling,  cervical adenopathy. Respiratory: shortness of breath, cough, or wheezing  Cardiovascular: chest pain, palpitations, dizzy, edema  Gastrointestinal: nausea, vomiting, diarrhea, constipation,belly pain    Yellow skin, blood in stool  Musculoskeletal:  back pain, muscle weakness, gait problems,       joint pain or swelling. Genitourinary:  dysuria, poor urine flow, flank pain, blood in urine  Neurologic:  vertigo, TIA'S, syncope, seizures, focal weakness  Psychosocial:  insomnia, anxiety, or depression.   Additional positive findings: -     PMH/SH/FH:    Medical Hx: reviewed and updated as appropriate  Past Medical History:   Diagnosis Date    Arthritis     Cataract     High blood pressure     Hyperlipidemia     stress test 2007    normal         Surgical Hx: reviewed and updated as appropriate   has a past surgical history that includes joint replacement and eye surgery (1985). Social Hx: reviewed and updated as appropriate  Social History     Tobacco Use    Smoking status: Never Smoker    Smokeless tobacco: Never Used   Substance Use Topics    Alcohol use: No     Alcohol/week: 0.0 standard drinks        Family hx: reviewed and updated as appropriate  family history includes Cancer in an other family member; High Blood Pressure in an other family member. Medications:   spironolactone, 25 mg, Daily  losartan, 100 mg, Daily  cholestyramine light, 4 g, Daily  carvedilol, 25 mg, BID WC  aspirin, 81 mg, Daily  sodium chloride flush, 10 mL, 2 times per day  enoxaparin, 40 mg, Nightly  cloNIDine, 0.1 mg, Once       Codeine, Sulfa antibiotics, and Atorvastatin    Allergies: Allergies   Allergen Reactions    Codeine Rash    Sulfa Antibiotics Anaphylaxis and Other (See Comments)    Atorvastatin      Reaction: Increases LFT's         Physical Exam/Objective:   Vitals:    03/18/21 1121   BP: (!) 167/72   Pulse: 71   Resp: 16   Temp: 97.9 °F (36.6 °C)   SpO2: 96%       Intake/Output Summary (Last 24 hours) at 3/18/2021 1340  Last data filed at 3/18/2021 1223  Gross per 24 hour   Intake 960 ml   Output 1420 ml   Net -460 ml         General appearance: female in no acute distress, comfortable, communicative and fully alert and oriented x 3. HEENT: Lips and teeth normal, no icterus, EOM intact, trachea midline. Neck : no masses, appears symmetrical and no JVD appreciated. Respiratory: Respiratory effort normal, bilateral equal chest rise. No wheeze, no crackles   Cardiovascular: Ausculation shows RR.  no edema Abdomen: abdomen is soft, non distended, no masses, no pain with palpation.   Musculoskeletal:  no joint swelling, no deformity, strength grossly normal.   Skin: no rashes, no induration, no tightening, no jaundice   Neuro/psychiatric:  Judgement and insight is normal. Follows commands, moves all extremities spontaneously       Data:   CBC:   Recent Labs     03/16/21  1315 03/17/21  0614 03/18/21  0542   WBC 6.7 5.6 4.6   HGB 15.5 13.5 13.1   HCT 43.9 38.6 37.5    182 159     BMP:    Recent Labs     03/16/21  1315 03/17/21  0614 03/18/21  0542   * 134* 132*   K 3.8 3.2* 3.4*   CL 97* 100 102   CO2 22 24 23   BUN 9 9 8   CREATININE <0.5* <0.5* <0.5*   GLUCOSE 111* 97 100*   MG  --  1.80 1.90

## 2021-03-18 NOTE — PROGRESS NOTES
Hospitalist Progress Note      PCP: Mariposa Giron MD    Date of Admission: 3/16/2021    Chief Complaint: dizziness, fall    Hospital Course: Pt is an 68y.o. year-old female with a history of hypertension and morbid obesity who presents to the emergency room for evaluation after falling at home. He reports a several day history of feeling weak and feverish. She reports having temperatures between 99.5 and 99.8 when she checked, but states that those are high temperatures for her. This morning she had 4 episodes of diarrhea. Following the last episode she was attempting to get off of the toilet when she fell forward. She did not sustain any injury, and did not lose consciousness. In the emergency room she was found to have orthostatic hypotension along with hypertensive urgency. She states that she has not taken any of her blood pressure medications today. Associated signs and symptoms do not include nausea, vomiting, abdominal pain, hemoptysis, hematochezia, constipation or urinary symptoms. Subjective: Pt seen and examined. Feels better today. Did not get dizzy when she stood up today. Blood pressures continue to be very labile. She is still +orthostatic.       Medications:  Reviewed    Infusion Medications    sodium chloride 75 mL/hr at 03/18/21 0557     Scheduled Medications    spironolactone  25 mg Oral Daily    losartan  100 mg Oral Daily    cholestyramine light  4 g Oral Daily    carvedilol  25 mg Oral BID WC    aspirin  81 mg Oral Daily    sodium chloride flush  10 mL Intravenous 2 times per day    enoxaparin  40 mg Subcutaneous Nightly    cloNIDine  0.1 mg Oral Once     PRN Meds: neomycin-bacitracin-polymyxin, hydrALAZINE, sodium chloride flush, traMADol, ondansetron, polyethylene glycol, acetaminophen **OR** acetaminophen      Intake/Output Summary (Last 24 hours) at 3/18/2021 2836  Last data filed at 3/18/2021 1714  Gross per 24 hour   Intake 1160 ml   Output 1900 ml   Net -740 ml Exam:    BP (!) 194/71   Pulse 77   Temp 98.2 °F (36.8 °C) (Oral)   Resp 16   Ht 5' 6\" (1.676 m)   Wt 218 lb 4.1 oz (99 kg)   SpO2 97%   BMI 35.23 kg/m²     General appearance: No apparent distress, appears stated age and cooperative. HEENT: Pupils equal, round, and reactive to light. Conjunctivae/corneas clear. Neck: Supple, with full range of motion. No jugular venous distention. Trachea midline. Respiratory:  Normal respiratory effort. Clear to auscultation, bilaterally without Rales/Wheezes/Rhonchi. Cardiovascular: Regular rate and rhythm with normal S1/S2 without murmurs, rubs or gallops. Abdomen: Soft, non-tender, non-distended with normal bowel sounds. Musculoskeletal: No clubbing, cyanosis or edema bilaterally. Full range of motion without deformity. Skin: Skin color, texture, turgor normal.  No rashes or lesions. Neurologic:  Neurovascularly intact without any focal sensory/motor deficits. Cranial nerves: II-XII intact, grossly non-focal.  Psychiatric: Alert and oriented, thought content appropriate, normal insight  Capillary Refill: Brisk,< 3 seconds   Peripheral Pulses: +2 palpable, equal bilaterally       Labs:   Recent Labs     03/16/21  1315 03/17/21  0614 03/18/21  0542   WBC 6.7 5.6 4.6   HGB 15.5 13.5 13.1   HCT 43.9 38.6 37.5    182 159     Recent Labs     03/16/21  1315 03/17/21  0614 03/18/21  0542   * 134* 132*   K 3.8 3.2* 3.4*   CL 97* 100 102   CO2 22 24 23   BUN 9 9 8   CREATININE <0.5* <0.5* <0.5*   CALCIUM 9.5 8.5 8.3     Recent Labs     03/16/21  1315   AST 19   ALT 26   BILIDIR 0.4*   BILITOT 2.5*   ALKPHOS 82     No results for input(s): INR in the last 72 hours.   Recent Labs     03/16/21  1315   TROPONINI <0.01       Urinalysis:      Lab Results   Component Value Date    NITRU Negative 03/16/2021    WBCUA 5 03/16/2021    BACTERIA 1+ 03/16/2021    RBCUA 10 03/16/2021    BLOODU TRACE-INTACT 03/16/2021    SPECGRAV 1.025 03/16/2021    GLUCOSEU Negative 03/16/2021    Nam Rubio 994 NEGATIVE 03/30/2012       Radiology:  CT HEAD WO CONTRAST   Final Result   No acute intracranial abnormality. XR CHEST PORTABLE   Final Result   No acute cardiopulmonary disease. Assessment/Plan:    Active Hospital Problems    Diagnosis Date Noted    Hypertensive urgency [I16.0] 03/16/2021    Orthostatic hypotension [I95.1] 03/16/2021    Dizziness [R42] 03/16/2021    Diarrhea [R19.7] 03/16/2021    Fall at home [Q52. Julio Lara, C69.437] 03/16/2021    Morbid obesity due to excess calories ([facility] [address] [E66.01]     Generalized weakness [R53.1] 05/23/2011    Hyperlipidemia [E78.5] 08/11/2010       Hypertensive urgency - Patient reports that she has not taken any of her blood pressure medications today. Resumed losartan and Coreg. Resumed Aldactone held Lasix for now. Stop IV hydralazine and start scheduled PO hydralazine.  -check renin/aldosterone, morning cortisol, 5-HIAA, metanephrines  -nephrology consulted, recs appreciated     Diarrhea - likely a viral infection. Stool studes ordered to include C diff, Fecal Leukocytes, Rotavirus Antigen, and bacterial pathogens by PCR. Will monitor and provide supportive care.      Orthostatic hypotension, Dizziness - likely secondary to poor intake today along with recurrent episodes of diarrhea. She will be given IV fluids and monitored  - Hold Lasix  - ACE wrap lower extremities  - check morning cortisol, TSH, free T4 - no abnormalities    Unconjugated hyperbilirubinemia - possible 2/2 Gilbert syndrome or hyperthyroidism  -check TSH, free T4  -check LFT tomorrow AM     Generalized weakness/Fall at home - Will ask PT/OT to evaluate and treat patient, and if necessary to provide recommendations for post hospital therapy     Hyperlipidemia -takes WelChol at home. Continue  therapy while in the hospital     Morbid obesity due to excess calories (Body mass index is 35.26 kg/m². ) - Complicating assessment and treatment.  Placing patient at risk for multiple co-morbidities as well as early death and contributing to the patient's presentation.  on weight loss when appropriate.       DVT Prophylaxis: Lovenox  Diet: DIET GENERAL;  Code Status: Full Code    PT/OT Eval Status: defer    Dispo - continue care    Jem Petty MD

## 2021-03-19 LAB
ALBUMIN SERPL-MCNC: 3.4 G/DL (ref 3.4–5)
ALDOSTERONE: 4.2 NG/DL
ALP BLD-CCNC: 64 U/L (ref 40–129)
ALT SERPL-CCNC: 22 U/L (ref 10–40)
ANION GAP SERPL CALCULATED.3IONS-SCNC: 9 MMOL/L (ref 3–16)
AST SERPL-CCNC: 15 U/L (ref 15–37)
BASOPHILS ABSOLUTE: 0 K/UL (ref 0–0.2)
BASOPHILS RELATIVE PERCENT: 1 %
BILIRUB SERPL-MCNC: 2 MG/DL (ref 0–1)
BILIRUBIN DIRECT: 0.3 MG/DL (ref 0–0.3)
BILIRUBIN, INDIRECT: 1.7 MG/DL (ref 0–1)
BUN BLDV-MCNC: 6 MG/DL (ref 7–20)
CALCIUM SERPL-MCNC: 8.7 MG/DL (ref 8.3–10.6)
CHLORIDE BLD-SCNC: 104 MMOL/L (ref 99–110)
CO2: 23 MMOL/L (ref 21–32)
CREAT SERPL-MCNC: <0.5 MG/DL (ref 0.6–1.2)
EOSINOPHILS ABSOLUTE: 0.2 K/UL (ref 0–0.6)
EOSINOPHILS RELATIVE PERCENT: 3.7 %
GFR AFRICAN AMERICAN: >60
GFR NON-AFRICAN AMERICAN: >60
GLUCOSE BLD-MCNC: 101 MG/DL (ref 70–99)
HCT VFR BLD CALC: 38.1 % (ref 36–48)
HEMOGLOBIN: 13.4 G/DL (ref 12–16)
LYMPHOCYTES ABSOLUTE: 1.2 K/UL (ref 1–5.1)
LYMPHOCYTES RELATIVE PERCENT: 27.8 %
MCH RBC QN AUTO: 31.8 PG (ref 26–34)
MCHC RBC AUTO-ENTMCNC: 35.1 G/DL (ref 31–36)
MCV RBC AUTO: 90.6 FL (ref 80–100)
MONOCYTES ABSOLUTE: 0.3 K/UL (ref 0–1.3)
MONOCYTES RELATIVE PERCENT: 7.7 %
NEUTROPHILS ABSOLUTE: 2.5 K/UL (ref 1.7–7.7)
NEUTROPHILS RELATIVE PERCENT: 59.8 %
PDW BLD-RTO: 14.4 % (ref 12.4–15.4)
PLATELET # BLD: 164 K/UL (ref 135–450)
PMV BLD AUTO: 8.8 FL (ref 5–10.5)
POTASSIUM REFLEX MAGNESIUM: 3.7 MMOL/L (ref 3.5–5.1)
RBC # BLD: 4.21 M/UL (ref 4–5.2)
SODIUM BLD-SCNC: 136 MMOL/L (ref 136–145)
TOTAL PROTEIN: 5.9 G/DL (ref 6.4–8.2)
WBC # BLD: 4.2 K/UL (ref 4–11)

## 2021-03-19 PROCEDURE — 94761 N-INVAS EAR/PLS OXIMETRY MLT: CPT

## 2021-03-19 PROCEDURE — 6370000000 HC RX 637 (ALT 250 FOR IP): Performed by: INTERNAL MEDICINE

## 2021-03-19 PROCEDURE — 80048 BASIC METABOLIC PNL TOTAL CA: CPT

## 2021-03-19 PROCEDURE — 85025 COMPLETE CBC W/AUTO DIFF WBC: CPT

## 2021-03-19 PROCEDURE — 97535 SELF CARE MNGMENT TRAINING: CPT

## 2021-03-19 PROCEDURE — 80076 HEPATIC FUNCTION PANEL: CPT

## 2021-03-19 PROCEDURE — 97110 THERAPEUTIC EXERCISES: CPT

## 2021-03-19 PROCEDURE — 6360000002 HC RX W HCPCS: Performed by: INTERNAL MEDICINE

## 2021-03-19 PROCEDURE — 2060000000 HC ICU INTERMEDIATE R&B

## 2021-03-19 PROCEDURE — 97116 GAIT TRAINING THERAPY: CPT

## 2021-03-19 PROCEDURE — 97530 THERAPEUTIC ACTIVITIES: CPT

## 2021-03-19 PROCEDURE — 2580000003 HC RX 258: Performed by: INTERNAL MEDICINE

## 2021-03-19 PROCEDURE — 36415 COLL VENOUS BLD VENIPUNCTURE: CPT

## 2021-03-19 RX ORDER — CLONIDINE HYDROCHLORIDE 0.1 MG/1
0.1 TABLET ORAL EVERY 4 HOURS PRN
Status: DISCONTINUED | OUTPATIENT
Start: 2021-03-19 | End: 2021-03-20 | Stop reason: HOSPADM

## 2021-03-19 RX ORDER — CLONIDINE HYDROCHLORIDE 0.1 MG/1
0.1 TABLET ORAL 2 TIMES DAILY
Status: DISCONTINUED | OUTPATIENT
Start: 2021-03-19 | End: 2021-03-19

## 2021-03-19 RX ORDER — HYDRALAZINE HYDROCHLORIDE 25 MG/1
25 TABLET, FILM COATED ORAL ONCE
Status: COMPLETED | OUTPATIENT
Start: 2021-03-19 | End: 2021-03-19

## 2021-03-19 RX ORDER — HYDRALAZINE HYDROCHLORIDE 50 MG/1
50 TABLET, FILM COATED ORAL EVERY 8 HOURS SCHEDULED
Status: DISCONTINUED | OUTPATIENT
Start: 2021-03-19 | End: 2021-03-20 | Stop reason: HOSPADM

## 2021-03-19 RX ORDER — NIFEDIPINE 60 MG/1
60 TABLET, EXTENDED RELEASE ORAL 2 TIMES DAILY
Status: DISCONTINUED | OUTPATIENT
Start: 2021-03-19 | End: 2021-03-20 | Stop reason: HOSPADM

## 2021-03-19 RX ADMIN — CARVEDILOL 25 MG: 6.25 TABLET, FILM COATED ORAL at 16:58

## 2021-03-19 RX ADMIN — NIFEDIPINE 60 MG: 60 TABLET, EXTENDED RELEASE ORAL at 20:11

## 2021-03-19 RX ADMIN — CARVEDILOL 25 MG: 6.25 TABLET, FILM COATED ORAL at 07:51

## 2021-03-19 RX ADMIN — SODIUM CHLORIDE, PRESERVATIVE FREE 10 ML: 5 INJECTION INTRAVENOUS at 21:55

## 2021-03-19 RX ADMIN — HYDRALAZINE HYDROCHLORIDE 50 MG: 50 TABLET, FILM COATED ORAL at 15:02

## 2021-03-19 RX ADMIN — ENOXAPARIN SODIUM 40 MG: 40 INJECTION SUBCUTANEOUS at 21:52

## 2021-03-19 RX ADMIN — CLONIDINE HYDROCHLORIDE 0.1 MG: 0.1 TABLET ORAL at 10:24

## 2021-03-19 RX ADMIN — CLONIDINE HYDROCHLORIDE 0.1 MG: 0.1 TABLET ORAL at 15:50

## 2021-03-19 RX ADMIN — ACETAMINOPHEN 650 MG: 325 TABLET ORAL at 21:54

## 2021-03-19 RX ADMIN — LOSARTAN POTASSIUM 100 MG: 25 TABLET, FILM COATED ORAL at 07:51

## 2021-03-19 RX ADMIN — ONDANSETRON 4 MG: 2 INJECTION INTRAMUSCULAR; INTRAVENOUS at 21:55

## 2021-03-19 RX ADMIN — CHOLESTYRAMINE 4 G: 4 POWDER, FOR SUSPENSION ORAL at 10:24

## 2021-03-19 RX ADMIN — ASPIRIN 81 MG: 81 TABLET, CHEWABLE ORAL at 07:51

## 2021-03-19 RX ADMIN — SPIRONOLACTONE 25 MG: 25 TABLET ORAL at 07:51

## 2021-03-19 RX ADMIN — HYDRALAZINE HYDROCHLORIDE 25 MG: 25 TABLET, FILM COATED ORAL at 10:24

## 2021-03-19 RX ADMIN — SODIUM CHLORIDE: 9 INJECTION, SOLUTION INTRAVENOUS at 07:00

## 2021-03-19 RX ADMIN — HYDRALAZINE HYDROCHLORIDE 25 MG: 25 TABLET, FILM COATED ORAL at 05:28

## 2021-03-19 RX ADMIN — SODIUM CHLORIDE, PRESERVATIVE FREE 10 ML: 5 INJECTION INTRAVENOUS at 07:52

## 2021-03-19 RX ADMIN — HYDRALAZINE HYDROCHLORIDE 50 MG: 50 TABLET, FILM COATED ORAL at 22:35

## 2021-03-19 ASSESSMENT — PAIN DESCRIPTION - LOCATION: LOCATION: HEAD

## 2021-03-19 ASSESSMENT — PAIN - FUNCTIONAL ASSESSMENT: PAIN_FUNCTIONAL_ASSESSMENT: ACTIVITIES ARE NOT PREVENTED

## 2021-03-19 ASSESSMENT — PAIN SCALES - WONG BAKER: WONGBAKER_NUMERICALRESPONSE: 0

## 2021-03-19 ASSESSMENT — PAIN DESCRIPTION - ORIENTATION: ORIENTATION: ANTERIOR

## 2021-03-19 ASSESSMENT — PAIN SCALES - GENERAL: PAINLEVEL_OUTOF10: 3

## 2021-03-19 ASSESSMENT — PAIN DESCRIPTION - PAIN TYPE: TYPE: ACUTE PAIN

## 2021-03-19 ASSESSMENT — PAIN DESCRIPTION - DESCRIPTORS: DESCRIPTORS: HEADACHE

## 2021-03-19 ASSESSMENT — PAIN DESCRIPTION - PROGRESSION: CLINICAL_PROGRESSION: NOT CHANGED

## 2021-03-19 ASSESSMENT — PAIN DESCRIPTION - ONSET: ONSET: GRADUAL

## 2021-03-19 NOTE — PROGRESS NOTES
Physical Therapy  Facility/Department: [address] PROGRESSIVE CARE  Daily Treatment Note  NAME: Vivi Marcos  : 1940  MRN: [id number]    Date of Service: 3/19/2021    Discharge Recommendations:  Patient would benefit from continued therapy after discharge, Home with Home health PT, S Level 1, Home with assist PRN   PT Equipment Recommendations  Equipment Needed: Yes  Mobility Devices: Jessica Alamin: Rolling     Vivi Marcos scored a 19/24 on the AM-PAC short mobility form. Current research shows that an AM-PAC score of 18 or greater is typically associated with a discharge to the patient's home setting. Based on the patient's AM-PAC score and their current functional mobility deficits, it is recommended that the patient have 2-3 sessions per week of Physical Therapy at d/c to increase the patient's independence. At this time, this patient demonstrates the endurance and safety to discharge home with home PT and a follow up treatment frequency of 2-3x/wk. Please see assessment section for further patient specific details. If patient discharges prior to next session this note will serve as a discharge summary. Please see below for the latest assessment towards goals. HOME HEALTH CARE: LEVEL 1 STANDARD     -Initial home health evaluation to occur within 24-48 hours, in patient home    -Home health agency to establish plan of care for patient over 60 day period    -Medication Reconciliation    -PCP Visit scheduled within seven days of discharge    -PT/OT to evaluate with goal of regaining prior level of functioning    -OT to evaluate if patient has [address] needs for personal care         Assessment   Body structures, Functions, Activity limitations: Decreased functional mobility ; Decreased endurance;Decreased balance  Assessment: Today, pt denied dizziness but required use of RW and CGA to ambulate household distance.   Anticipate pt will be safe to go home with assist PRN from family and home health PT level 1 to address gait and activity tolerance. Pt will need RW for safe mobility at home. Treatment Diagnosis: decreased functional mobility following orthostatic BP  Prognosis: Good  PT Education: Goals;PT Role;Plan of Care;General Safety;Gait Training;Transfer Training;Home Exercise Program;Equipment  REQUIRES PT FOLLOW UP: Yes  Activity Tolerance  Activity Tolerance: Patient limited by endurance; Patient Tolerated treatment well  Activity Tolerance: denied dizziness     Patient Diagnosis(es): The primary encounter diagnosis was Orthostatic hypotension. Diagnoses of Dizziness, General weakness, and Fever, unspecified fever cause were also pertinent to this visit. has a past medical history of Arthritis, Cataract, High blood pressure, Hyperlipidemia, and stress test.   has a past surgical history that includes joint replacement and eye surgery (1985). Restrictions  Restrictions/Precautions  Restrictions/Precautions: Fall Risk     Social/Functional History  Lives With: Spouse  Type of Home: House  Home Layout: Laundry in basement( does laundry)  Home Access: Stairs to enter with rails  Entrance Stairs - Number of Steps: 1 + 3 to go in from the back  Entrance Stairs - Rails: Right  Bathroom Shower/Tub: Walk-in shower  Bathroom Toilet: Standard  Bathroom Equipment: Grab bars around toilet  Bathroom Accessibility: Accessible  Home Equipment: Standard walker(old walker, did not use, was from family)  [address] Help From: Family  ADL Assistance: Independent  Homemaking Assistance: Independent  Homemaking Responsibilities: (pt shares cleaning,  cooks, pt sorts laundry,  washes, pt folds)  Ambulation Assistance: Independent  Transfer Assistance: Independent  Active : Yes  Occupation: Retired  Type of occupation: data processing    Subjective   General  Chart Reviewed:  Yes  Additional Pertinent Hx: Pt admitted with dizziness and falls, orthostatic hypotension  Response To Previous Treatment: Patient with no complaints from previous session. Family / Caregiver Present: No  Referring Practitioner: Ronel Cha  Subjective  Subjective: Pt pleasant and agreeable to PT treatment. Denies pain and dizziness. On BSC with RN at beginning of session.                  Objective   Bed mobility  Supine to Sit: Stand by assistance(HOB elevated)  Sit to Supine: Stand by assistance(HOB flat)  Scooting: Stand by assistance     Transfers  Sit to Stand: Contact guard assistance  Stand to sit: Contact guard assistance  Comment: cues for hand placement     Ambulation  Ambulation?: Yes  More Ambulation?: No  Ambulation 1  Surface: level tile  Device: Rolling Walker  Assistance: Contact guard assistance  Quality of Gait: slow pace, small steps, bumps into objects with RW but self corrects, no LOB  Gait Deviations: Slow Viviana;Decreased step length;Decreased step height  Distance: 8' and then approx 61' with multiple turns  Comments: denied dizziness  Stairs/Curb  Stairs?: No     Balance  Posture: Fair  Sitting - Static: Good  Sitting - Dynamic: Good  Standing - Static: Fair;+  Standing - Dynamic: Fair  Comments: pt stood at the sink for approx 6 min washing hands, brushing teeth and brushing hair with SBA for standing balance and no complaints of dizziness     Exercises  Hip Flexion: x10 alternating marches  Hip Abduction: x10 kay no resistance with cues for technique  Knee Long Arc Quad: x10 kay with cues for full ROM  Ankle Pumps: x20 heel raises/toe raises  Comments: ther ex seated EOB with supervision for sitting balance; decreased coordination noted in BLEs with alternating movements         Other Activities: Other (see comment)  Comment: pt on BSC upon arrival with RN; PT assisted pt with changing brief sitting on BSC and PT completed pericare and assisted with donning brief- SBA-CGA for standing balance throughout; pt set up with lunch tray at end of session sitting EOB            AM-PAC Score  AM-PAC Inpatient Mobility Raw Score : 19 (03/19/21 1227)  AM-PAC Inpatient T-Scale Score : 45.44 (03/19/21 1227)  Mobility Inpatient CMS 0-100% Score: 41.77 (03/19/21 1227)  Mobility Inpatient CMS G-Code Modifier : CK (03/19/21 1227)          Goals  Short term goals  Time Frame for Short term goals: 3 to 5 days (all goals ongoing as of 3/19)  Short term goal 1: all trasnfers S to modif I  Short term goal 2: amb 48' with wh walker S to modif I  Short term goal 3: standing LE ex without dizziness  Patient Goals   Patient goals : go home, walk without feeling dizzy    Plan    Plan  Times per week: 3 to 5  Specific instructions for Next Treatment: increase gt as able  Current Treatment Recommendations: Transfer Training, Gait Training, Endurance Training  Safety Devices  Type of devices:  All fall risk precautions in place, Bed alarm in place, Call light within reach, Left in bed, Nurse notified, Gait belt(Pt sitting EOB with bed alarm on at end of session- BRIDGET Feliz aware)  Restraints  Initially in place: No     Therapy Time   Individual Concurrent Group Co-treatment   Time In 1146         Time Out 1227         Minutes 41         Timed Code Treatment Minutes: 41 Minutes         Electronically signed by Carmine Murillo on 3/19/2021 at 12:30 PM

## 2021-03-19 NOTE — PLAN OF CARE
Problem: Falls - Risk of:  Goal: Will remain free from falls  Description: Will remain free from falls  3/19/2021 1316 by Celestino Hui RN  Outcome: Ongoing   Fall risk assessment completed . Fall precautions in place, bed/ chair alarm on, side rails 2/4 up, call light in reach, educated pt on calling for assistance when needed, room clear of clutter. Pt verbalized understanding. Problem: Safety:  Goal: Free from accidental physical injury  Description: Free from accidental physical injury  3/19/2021 1316 by Celestino Hui RN  Outcome: Ongoing   Patient assessed for fall risk; fall precautions initiated. Patient and family instructed about safety devices. Environment kept free of clutter and adequate lighting provided. Bed locked and in lowest position. Call light within reach. Will continue to monitor. Problem: Skin Integrity:  Goal: Skin integrity will stabilize  Description: Skin integrity will stabilize  3/19/2021 1316 by Celestino Hui RN  Outcome: Ongoing     Problem: Skin Integrity:  Goal: Absence of new skin breakdown  Description: Absence of new skin breakdown  3/19/2021 1316 by Celestino Hui RN  Outcome: Ongoing   Skin assessment completed every shift. Pt assessed for incontinence, appropriate barrier cream applied prn. Pt encouraged to turn/rotate every 2 hours. Assistance provided if pt unable to do so themselves.

## 2021-03-19 NOTE — FLOWSHEET NOTE
Orthostatic Blood Pressures for 2021 Mornin/19/21 0746   Vital Signs   Blood Pressure Lying 202/76   Pulse Lying 77 PER MINUTE   Blood Pressure Sitting 199/86   Pulse Sitting 78 PER MINUTE   Blood Pressure Standing 168/77   Pulse Standing 79 PER MINUTE   Level of Consciousness Alert (0)   MEWS Score 3 Validate Date Of Previous Biopsy (Can Hide Date Of Previous Biopsy In Settings Tab): No

## 2021-03-19 NOTE — PROGRESS NOTES
[facility]                                                 [phone]                                      [facility]                                        Inpatient Progress note    Summary:   Luis Manuel Enciso is being seen by nephrology for orthostatic hypotension and labile hypertension. She was admitted after a fall where she felt dizziness with standing in the bathroom. She was recently started on lexapro. Also had some preceeding poor PO intake and diarrhea. Interval History  Seen and examined at bedside. She has no complaints though is very anxious about her BP issues. She has had some dizziness with standing but less than when she was admitted. /76 laying down  168/77 standing. UO 3.5 L   Negative 2.1 L past day     Her home meds: losartan 100 mg BID, aldactone 25 mg, coreg 25 mg BID. Lasix held, she had had diarrhea and + orthostats still. Clonidine 0.1 mg BID added this AM.     Plan:   - her orthostatic hypotension has improved with fluids. It is possible that the SSRI recently added could have exacerbated this. - would continue to hold loop diuretics. - her BP is currently 130/71 but had been 207/76 earlier. Got a dose of clonidine   - can stop IVFs and encourage PO intake. - clonidine may not be the best long term agent for her, especially since she is BB  - will add procardia 60 mg BID and stop the clonidine. Would try to wean off hydralazine because it is TID med. - renin ,josseline, metanephrines all processing    [facility] thanks you for the opportunity to serve this patient. Please call with any questions of concerns. Hebert Anderson MD  [facility]  [address] [facility], [address]  Fax: [phone]  Office: [phone]    Assessment:   #Labile hypertension:   Home BP 564O systolic   Here she was 990/32 and then dropeed to 88/54 with standing. Pulse did not change but she is on BB  Clinically not hypovolemic.    She Ausculation shows RR, no edema  Abdomen: No visible mass or tenderness, non distended. Musculoskeletal:  Joints with no swelling or deformity. Strength grossly normal  Skin:no rashes, ulcers, induration, no jaundice. Neuro: face symmetric, no focal deficits. Appropriate responses.  CN 2-12 grossly intact      Lab Results   Component Value Date    CREATININE <0.5 (L) 03/19/2021    BUN 6 (L) 03/19/2021     03/19/2021    K 3.7 03/19/2021     03/19/2021    CO2 23 03/19/2021      Lab Results   Component Value Date    WBC 4.2 03/19/2021    HGB 13.4 03/19/2021    HCT 38.1 03/19/2021    MCV 90.6 03/19/2021     03/19/2021     Lab Results   Component Value Date    PTH 29.8 12/14/2010    CALCIUM 8.7 03/19/2021    CAION 1.16 01/13/2016    PHOS 3.1 01/14/2016

## 2021-03-19 NOTE — PROGRESS NOTES
Occupational Therapy  Facility/Department: [address] PROGRESSIVE CARE  Daily Treatment Note  NAME: Afshan Hernandez  : 1940  MRN: [id number]    Date of Service: 3/19/2021    Discharge Recommendations:  Continue to assess pending progress, 24 hour supervision or assist  OT Equipment Recommendations  Other: possible shower chair pending progress    Assessment   Performance deficits / Impairments: Decreased functional mobility ; Decreased endurance;Decreased ADL status; Decreased high-level IADLs;Decreased balance  Assessment: Pt making progress towards goals. Pt with improved fxl transfers with SBA, and improved standing tolerance/balance to complete fxl mobility further distances with RW and CGA, and to stand at sink for grooming with SBA. Pt SBA/CGA toileting, and anticipate pt would require overall min A for ADLs. Pt participated in B UE therex to improved strength/endurance for ADLs. Tolerated well. Cont per OT POC to address the above limitations and maximize pt's safety and independence. Anticipate pt will be safe to return home with 's assist at d/c. Prognosis: Good  OT Education: Plan of Care;Transfer Training;OT Role;ADL Adaptive Strategies; Home Exercise Program  REQUIRES OT FOLLOW UP: Yes  Activity Tolerance  Activity Tolerance: Patient Tolerated treatment well  Safety Devices  Safety Devices in place: Yes  Type of devices: Bed alarm in place; Left in bed;Nurse notified;Call light within reach         Patient Diagnosis(es): The primary encounter diagnosis was Orthostatic hypotension. Diagnoses of Dizziness, General weakness, and Fever, unspecified fever cause were also pertinent to this visit. has a past medical history of Arthritis, Cataract, High blood pressure, Hyperlipidemia, and stress test.   has a past surgical history that includes joint replacement and eye surgery ().     Restrictions  Restrictions/Precautions  Restrictions/Precautions: Fall Risk  Subjective   General  Chart Reviewed: Yes  Patient assessed for rehabilitation services?: Yes  Additional Pertinent Hx: per ED note, Sudha Boston is a 68 y.o. female who indicates that she has not been feeling as well for the last several days. She states that she first noticed that she was feeling feverish last Wednesday evening. She states that every evening since then she has felt feverish all night long with sweats and chills and temperatures between 99.5 and 99.8 when she has checked them. She states that she really has not had a lot of other symptoms along with those elevated temperatures. She denies any cough or congestion. No abdominal pain or difficult eating or drinking. No urinary discomfort or increased urination or blood in urine. Family / Caregiver Present: No  Referring Practitioner: Holly Garzon MD  Subjective  Subjective: Pt met b/s for OT tx. Pt seated on [facility] with nursing on arrival, agreeable to participate in therapy. Pt denies pain. Pt very pleasant and cooperative. Objective    ADL  Grooming: Stand by assistance(standing at sink to brush teeth, wash hands)  Toileting: Contact guard assistance;Stand by assistance(dry trial on BSC, then voided urine at comfort height toilet. SBA/CGA for clothing management and pericare in standing)     Balance  Sitting Balance: Supervision  Standing Balance: Stand by assistance  Functional Mobility  Functional - Mobility Device: Rolling Walker  Activity: Other; To/from bathroom  Assist Level: Contact guard assistance  Functional Mobility Comments: Pt completed fxl mobility 2 laps around room and to/from BR with RW and CGA. Pt running into objects, needs verbal cues to direct.     Bed mobility  Sit to Supine: Stand by assistance  Scooting: Stand by assistance     Transfers  Sit to stand: Stand by assistance  Stand to sit: Stand by assistance   Toilet Transfers  Toilet - Technique: Ambulating  Equipment Used: Standard bedside commode(and comfort height toilet)  Toilet Transfer: Stand by assistance  Toilet Transfers Comments: to/from [facility] and comfort height toilet    Cognition  Overall Cognitive Status: WFL     Type of ROM/Therapeutic Exercise  Type of ROM/Therapeutic Exercise: AROM  Comment: Pt completed B UE therex to improve strength/endurance for ADLs. Exercises  Shoulder Flexion: x10  Horizontal ABduction: x10  Horizontal ADduction: x10  Other: arm circles forwards/backwards x10 reps each direction, standing at RW pt completed calf raises and mini squats x10 reps of improve standing tolerance/balance for ADLs. Plan   Plan  Times per week: 3-5x  Current Treatment Recommendations: Strengthening, Functional Mobility Training, Endurance Training, Balance Training, Self-Care / ADL, Safety Education & Training, Equipment Evaluation, Education, & procurement, Patient/Caregiver Education & Training    AM-PAC Score        AM-[facility] Inpatient Daily Activity Raw Score: 21 (03/19/21 1548)  AM-PAC Inpatient ADL T-Scale Score : 44.27 (03/19/21 1548)  ADL Inpatient CMS 0-100% Score: 32.79 (03/19/21 1548)  ADL Inpatient CMS G-Code Modifier : Alverna Reas (03/19/21 1548)    Goals  Short term goals  Time Frame for Short term goals: prior to D/C: STATUS GOALS 3/19: ALL GOALS ONGOING  Short term goal 1: complete functional mobility and transfers Mod Ind  Short term goal 2: complete bathing and dressing Mod Ind  Short term goal 3: complete toileting Mod Ind  Short term goal 4: complete grooming in stance at sink Mod Ind  Long term goals  Time Frame for Long term goals : STG=LTG  Patient Goals   Patient goals : return home       Therapy Time   Individual Concurrent Group Co-treatment   Time In 1400         Time Out 1434         Minutes 34              This note to serve as OT d/c summary if pt is d/c-ed prior to next therapy session.       Sherly Rivas, OTR/L 1552

## 2021-03-19 NOTE — PROGRESS NOTES
Hospitalist Progress Note      PCP: Tiarra Werner MD    Date of Admission: 3/16/2021    Chief Complaint: dizziness, fall    Hospital Course: Pt is an 68y.o. year-old female with a history of hypertension and morbid obesity who presents to the emergency room for evaluation after falling at home. He reports a several day history of feeling weak and feverish. She reports having temperatures between 99.5 and 99.8 when she checked, but states that those are high temperatures for her. This morning she had 4 episodes of diarrhea. Following the last episode she was attempting to get off of the toilet when she fell forward. She did not sustain any injury, and did not lose consciousness. In the emergency room she was found to have orthostatic hypotension along with hypertensive urgency. She states that she has not taken any of her blood pressure medications today. Associated signs and symptoms do not include nausea, vomiting, abdominal pain, hemoptysis, hematochezia, constipation or urinary symptoms. Subjective: Pt seen and examined. Had some abdominal pain earlier this morning that resolved with a bowel movement. Not dizzy.       Medications:  Reviewed    Infusion Medications    sodium chloride 75 mL/hr at 03/19/21 0700     Scheduled Medications    hydrALAZINE  50 mg Oral 3 times per day    cloNIDine  0.1 mg Oral BID    spironolactone  25 mg Oral Daily    losartan  100 mg Oral Daily    cholestyramine light  4 g Oral Daily    carvedilol  25 mg Oral BID WC    aspirin  81 mg Oral Daily    sodium chloride flush  10 mL Intravenous 2 times per day    enoxaparin  40 mg Subcutaneous Nightly    cloNIDine  0.1 mg Oral Once     PRN Meds: neomycin-bacitracin-polymyxin, sodium chloride flush, traMADol, ondansetron, polyethylene glycol, acetaminophen **OR** acetaminophen      Intake/Output Summary (Last 24 hours) at 3/19/2021 1056  Last data filed at 3/19/2021 0923  Gross per 24 hour   Intake 1880 ml   Output 3900 ml   Net -2020 ml       Exam:    /61   Pulse 87   Temp 98.4 °F (36.9 °C) (Oral)   Resp 18   Ht 5' 6\" (1.676 m)   Wt 220 lb 0.3 oz (99.8 kg)   SpO2 96%   BMI 35.51 kg/m²     General appearance: No apparent distress, appears stated age and cooperative. HEENT: Pupils equal, round, and reactive to light. Conjunctivae/corneas clear. Neck: Supple, with full range of motion. No jugular venous distention. Trachea midline. Respiratory:  Normal respiratory effort. Clear to auscultation, bilaterally without Rales/Wheezes/Rhonchi. Cardiovascular: Regular rate and rhythm with normal S1/S2 without murmurs, rubs or gallops. Abdomen: Soft, non-tender, non-distended with normal bowel sounds. Musculoskeletal: No clubbing, cyanosis or edema bilaterally. Full range of motion without deformity. Skin: Skin color, texture, turgor normal.  No rashes or lesions. Neurologic:  Neurovascularly intact without any focal sensory/motor deficits. Cranial nerves: II-XII intact, grossly non-focal.  Psychiatric: Alert and oriented, thought content appropriate, normal insight  Capillary Refill: Brisk,< 3 seconds   Peripheral Pulses: +2 palpable, equal bilaterally       Labs:   Recent Labs     03/17/21  0614 03/18/21  0542 03/19/21  0525   WBC 5.6 4.6 4.2   HGB 13.5 13.1 13.4   HCT 38.6 37.5 38.1    159 164     Recent Labs     03/17/21  0614 03/18/21  0542 03/19/21  0525   * 132* 136   K 3.2* 3.4* 3.7    102 104   CO2 24 23 23   BUN 9 8 6*   CREATININE <0.5* <0.5* <0.5*   CALCIUM 8.5 8.3 8.7     Recent Labs     03/16/21  1315 03/19/21  0525   AST 19 15   ALT 26 22   BILIDIR 0.4* 0.3   BILITOT 2.5* 2.0*   ALKPHOS 82 64     No results for input(s): INR in the last 72 hours.   Recent Labs     03/16/21  1315   TROPONINI <0.01       Urinalysis:      Lab Results   Component Value Date    NITRU Negative 03/16/2021    WBCUA 5 03/16/2021    BACTERIA 1+ 03/16/2021    RBCUA 10 03/16/2021    BLOODU TRACE-INTACT 2021    SPECGRAV 1.025 2021    GLUCOSEU Negative 2021    GLUCOSEU NEGATIVE 2012       Radiology:  CT HEAD WO CONTRAST   Final Result   No acute intracranial abnormality. XR CHEST PORTABLE   Final Result   No acute cardiopulmonary disease. Assessment/Plan:    Active Hospital Problems    Diagnosis Date Noted    Hypertensive urgency [I16.0] 2021    Orthostatic hypotension [I95.1] 2021    Dizziness [R42] 2021    Diarrhea [R19.7] 2021    Fall at home [Y49. Ricardo Barron, G86.292] 2021    Morbid obesity due to excess calories ([facility] [address] [E66.01]     Generalized weakness [R53.1] 2011    Hyperlipidemia [E78.5] 2010       Hypertensive urgency - Patient reports that she has not taken any of her blood pressure medications today. Resumed losartan and Coreg. Resumed Aldactone held Lasix for now. Stop IV hydralazine and start scheduled PO hydralazine.  -cortisol WNL  -check renin/aldosterone, 5-HIAA, metanephrines  -nephrology consulted, recs appreciated  -added scheduled hydralazine and started on clonidine with improved pressures so far     Diarrhea - likely a viral infection. Stool studes ordered to include C diff, Fecal Leukocytes, Rotavirus Antigen, and bacterial pathogens by PCR. Will monitor and provide supportive care.      Orthostatic hypotension, Dizziness - likely secondary to poor intake today along with recurrent episodes of diarrhea.   She will be given IV fluids and monitored  - Hold Lasix  - ACE wrap lower extremities  - check morning cortisol, TSH, free T4 - no abnormalities  - rechecked orthostatic vitals personally   Lyin/72   Sittin/71   Standin/71    Unconjugated hyperbilirubinemia - possible 2/2 Gilbert syndrome or hyperthyroidism - improved  -TSH, free T4 WNL  -trend     Generalized weakness/Fall at home - Will ask PT/OT to evaluate and treat patient, and if necessary to provide recommendations for post hospital therapy     Hyperlipidemia -takes WelChol at home. Continue  therapy while in the hospital     Morbid obesity due to excess calories (Body mass index is 35.26 kg/m². ) - Complicating assessment and treatment. Placing patient at risk for multiple co-morbidities as well as early death and contributing to the patient's presentation.  on weight loss when appropriate.       DVT Prophylaxis: Lovenox  Diet: DIET GENERAL;  Code Status: Full Code    PT/OT Eval Status: defer    Dispo - continue care    Kacy Stanley MD

## 2021-03-20 VITALS
SYSTOLIC BLOOD PRESSURE: 151 MMHG | BODY MASS INDEX: 34.93 KG/M2 | TEMPERATURE: 99.4 F | DIASTOLIC BLOOD PRESSURE: 68 MMHG | OXYGEN SATURATION: 95 % | WEIGHT: 217.37 LBS | HEART RATE: 85 BPM | HEIGHT: 66 IN | RESPIRATION RATE: 16 BRPM

## 2021-03-20 LAB
ANION GAP SERPL CALCULATED.3IONS-SCNC: 10 MMOL/L (ref 3–16)
BASOPHILS ABSOLUTE: 0 K/UL (ref 0–0.2)
BASOPHILS RELATIVE PERCENT: 0.8 %
BLOOD CULTURE, ROUTINE: NORMAL
BUN BLDV-MCNC: 10 MG/DL (ref 7–20)
CALCIUM SERPL-MCNC: 8.9 MG/DL (ref 8.3–10.6)
CHLORIDE BLD-SCNC: 101 MMOL/L (ref 99–110)
CO2: 23 MMOL/L (ref 21–32)
CREAT SERPL-MCNC: 0.6 MG/DL (ref 0.6–1.2)
CULTURE, BLOOD 2: NORMAL
EOSINOPHILS ABSOLUTE: 0.2 K/UL (ref 0–0.6)
EOSINOPHILS RELATIVE PERCENT: 3.7 %
GFR AFRICAN AMERICAN: >60
GFR NON-AFRICAN AMERICAN: >60
GLUCOSE BLD-MCNC: 101 MG/DL (ref 70–99)
HCT VFR BLD CALC: 37.6 % (ref 36–48)
HEMOGLOBIN: 12.9 G/DL (ref 12–16)
LYMPHOCYTES ABSOLUTE: 1.1 K/UL (ref 1–5.1)
LYMPHOCYTES RELATIVE PERCENT: 23.1 %
MCH RBC QN AUTO: 31.3 PG (ref 26–34)
MCHC RBC AUTO-ENTMCNC: 34.2 G/DL (ref 31–36)
MCV RBC AUTO: 91.5 FL (ref 80–100)
METANEPH/PLASMA INTERP: NORMAL
METANEPHRINE FREE PLASMA: 0.14 NMOL/L (ref 0–0.49)
MONOCYTES ABSOLUTE: 0.3 K/UL (ref 0–1.3)
MONOCYTES RELATIVE PERCENT: 7.3 %
NEUTROPHILS ABSOLUTE: 3.1 K/UL (ref 1.7–7.7)
NEUTROPHILS RELATIVE PERCENT: 65.1 %
NORMETANEPHRINE FREE PLASMA: 0.59 NMOL/L (ref 0–0.89)
PDW BLD-RTO: 14.1 % (ref 12.4–15.4)
PLATELET # BLD: 196 K/UL (ref 135–450)
PMV BLD AUTO: 9.2 FL (ref 5–10.5)
POTASSIUM REFLEX MAGNESIUM: 3.9 MMOL/L (ref 3.5–5.1)
RBC # BLD: 4.1 M/UL (ref 4–5.2)
SODIUM BLD-SCNC: 134 MMOL/L (ref 136–145)
WBC # BLD: 4.7 K/UL (ref 4–11)

## 2021-03-20 PROCEDURE — 36415 COLL VENOUS BLD VENIPUNCTURE: CPT

## 2021-03-20 PROCEDURE — 94760 N-INVAS EAR/PLS OXIMETRY 1: CPT

## 2021-03-20 PROCEDURE — 80048 BASIC METABOLIC PNL TOTAL CA: CPT

## 2021-03-20 PROCEDURE — 6370000000 HC RX 637 (ALT 250 FOR IP): Performed by: INTERNAL MEDICINE

## 2021-03-20 PROCEDURE — 85025 COMPLETE CBC W/AUTO DIFF WBC: CPT

## 2021-03-20 PROCEDURE — 2580000003 HC RX 258: Performed by: INTERNAL MEDICINE

## 2021-03-20 PROCEDURE — 6370000000 HC RX 637 (ALT 250 FOR IP): Performed by: NURSE PRACTITIONER

## 2021-03-20 RX ORDER — LANOLIN ALCOHOL/MO/W.PET/CERES
3 CREAM (GRAM) TOPICAL NIGHTLY PRN
Status: DISCONTINUED | OUTPATIENT
Start: 2021-03-20 | End: 2021-03-20 | Stop reason: HOSPADM

## 2021-03-20 RX ORDER — HYDRALAZINE HYDROCHLORIDE 50 MG/1
50 TABLET, FILM COATED ORAL EVERY 8 HOURS PRN
Qty: 90 TABLET | Refills: 3 | Status: SHIPPED | OUTPATIENT
Start: 2021-03-20 | End: 2021-06-14

## 2021-03-20 RX ORDER — NIFEDIPINE 60 MG/1
60 TABLET, FILM COATED, EXTENDED RELEASE ORAL 2 TIMES DAILY
Qty: 30 TABLET | Refills: 3 | Status: SHIPPED | OUTPATIENT
Start: 2021-03-20 | End: 2021-05-17 | Stop reason: DRUGHIGH

## 2021-03-20 RX ADMIN — CHOLESTYRAMINE 4 G: 4 POWDER, FOR SUSPENSION ORAL at 08:29

## 2021-03-20 RX ADMIN — CARVEDILOL 25 MG: 6.25 TABLET, FILM COATED ORAL at 08:29

## 2021-03-20 RX ADMIN — NIFEDIPINE 60 MG: 60 TABLET, EXTENDED RELEASE ORAL at 08:28

## 2021-03-20 RX ADMIN — LOSARTAN POTASSIUM 100 MG: 25 TABLET, FILM COATED ORAL at 08:28

## 2021-03-20 RX ADMIN — SPIRONOLACTONE 25 MG: 25 TABLET ORAL at 08:28

## 2021-03-20 RX ADMIN — ASPIRIN 81 MG: 81 TABLET, CHEWABLE ORAL at 08:28

## 2021-03-20 RX ADMIN — SODIUM CHLORIDE, PRESERVATIVE FREE 10 ML: 5 INJECTION INTRAVENOUS at 08:29

## 2021-03-20 RX ADMIN — Medication 3 MG: at 02:16

## 2021-03-20 RX ADMIN — HYDRALAZINE HYDROCHLORIDE 50 MG: 50 TABLET, FILM COATED ORAL at 05:45

## 2021-03-20 NOTE — DISCHARGE SUMMARY
Hospital Medicine Discharge Summary    Patient ID: Barbara Matson      Patient's PCP: Zuhair Singh MD    Admit Date: 3/16/2021     Discharge Date:   3/20/2021    Admitting Physician: Juan Diamond MD     Discharge Physician: Amparo York MD     Discharge Diagnoses: Active Hospital Problems    Diagnosis Date Noted    Hypertensive urgency [I16.0] 03/16/2021    Orthostatic hypotension [I95.1] 03/16/2021    Dizziness [R42] 03/16/2021    Diarrhea [R19.7] 03/16/2021    Fall at home [U13. Narendra Mckeon, V47.014] 03/16/2021    Morbid obesity due to excess calories ([facility] [address] [E66.01]     Generalized weakness [R53.1] 05/23/2011    Hyperlipidemia [E78.5] 08/11/2010       The patient was seen and examined on day of discharge and this discharge summary is in conjunction with any daily progress note from day of discharge. Hospital Course: Pt is an 68y.o. year-old female with a history of hypertension and morbid obesity who presents to the emergency room for evaluation after falling at home. He reports a several day history of feeling weak and feverish. She reports having temperatures between 99.5 and 99.8 when she checked, but states that those are high temperatures for her. This morning she had 4 episodes of diarrhea. Following the last episode she was attempting to get off of the toilet when she fell forward. She did not sustain any injury, and did not lose consciousness. In the emergency room she was found to have orthostatic hypotension along with hypertensive urgency. She states that she has not taken any of her blood pressure medications today. Associated signs and symptoms do not include nausea, vomiting, abdominal pain, hemoptysis, hematochezia, constipation or urinary symptoms. Hypertensive urgency - Patient reports that she has not taken any of her blood pressure medications today.  Resumed losartan and Coreg. Resumed Aldactone held Lasix for now.  Stopped IV hydralazine and start scheduled PO hydralazine.  -added Procardia with good response and will make PO hydralazine PRN at discharge  -cortisol WNL  -check renin/aldosterone, 5-HIAA, metanephrines  -nephrology consulted, recs appreciated     Diarrhea - likely a viral infection. Stool studes ordered to include C diff, Fecal Leukocytes, Rotavirus Antigen, and bacterial pathogens by PCR. Will monitor and provide supportive care.      Orthostatic hypotension, Dizziness - likely secondary to poor intake today along with recurrent episodes of diarrhea.  She will be given IV fluids and monitored  - Hold Lasix  - ACE wrap lower extremities  - check morning cortisol, TSH, free T4 - no abnormalities  -orthostatic improved significantly on day of discharge and patient asymptomatic     Unconjugated hyperbilirubinemia - possible 2/2 Gilbert syndrome or hyperthyroidism - improved  -TSH, free T4 WNL  -trend     Generalized weakness/Fall at home - Will ask PT/OT to evaluate and treat patient, and if necessary to provide recommendations for post hospital therapy     Hyperlipidemia -takes WelChol at home. Continue  therapy while in the hospital     Morbid obesity due to excess calories (Body mass index is 35.26 kg/m². ) - Complicating assessment and treatment. Placing patient at risk for multiple co-morbidities as well as early death and contributing to the patient's presentation.  on weight loss when appropriate. Exam:     BP (!) 151/68   Pulse 85   Temp 99.4 °F (37.4 °C) (Oral)   Resp 16   Ht 5' 6\" (1.676 m)   Wt 217 lb 6 oz (98.6 kg)   SpO2 95%   BMI 35.09 kg/m²       General appearance:  No apparent distress, appears stated age and cooperative. HEENT:  Normal cephalic, atraumatic without obvious deformity. Pupils equal, round, and reactive to light. Extra ocular muscles intact. Conjunctivae/corneas clear. Neck: Supple, with full range of motion. No jugular venous distention. Trachea midline. Respiratory:  Normal respiratory effort.  Clear to NIFEdipine (ADALAT CC) 60 MG extended release tablet Take 1 tablet by mouth 2 times daily  Qty: 30 tablet, Refills: 3              Details   losartan (COZAAR) 100 MG tablet TAKE 1 TABLET BY MOUTH  DAILY  Qty: 90 tablet, Refills: 3    Comments: Requesting 1 year supply      colesevelam (WELCHOL) 625 MG tablet TAKE 3 TABLETS BY MOUTH TWO TIMES DAILY WITH MEALS  Qty: 540 tablet, Refills: 3    Comments: Requesting 1 year supply  Associated Diagnoses: Essential hypertension, benign      spironolactone (ALDACTONE) 25 MG tablet TAKE 1 TABLET BY MOUTH  DAILY  Qty: 90 tablet, Refills: 2      carvedilol (COREG) 25 MG tablet TAKE 1 TABLET BY MOUTH TWO  TIMES DAILY  Qty: 180 tablet, Refills: 3      traMADol (ULTRAM) 50 MG tablet Take 1 tablet by mouth every 8 hours as needed for Pain  Qty: 270 tablet, Refills: 1    Associated Diagnoses: Essential hypertension, benign      aspirin 81 MG tablet Take 81 mg by mouth daily. Ascorbic Acid (VITAMIN C) 1000 MG tablet Take 1,000 mg by mouth daily. calcium-vitamin D 500 MG tablet Take 1 tablet by mouth 2 times daily. Multiple Vitamin (MULTIVITAMIN PO) Take 1 tablet by mouth daily              Time Spent on discharge is more than 30 minutes in the examination, evaluation, counseling and review of medications and discharge plan. Signed:    Sage Ha MD   3/20/2021      Thank you Mariposa Giron MD for the opportunity to be involved in this patient's care. If you have any questions or concerns please feel free to contact me at [phone].

## 2021-03-20 NOTE — PROGRESS NOTES
[facility]                                                 [phone]                                      [facility]                                        Inpatient Progress note    Summary:   Cisco Noonan is being seen by nephrology for orthostatic hypotension and labile hypertension. She was admitted after a fall where she felt dizziness with standing in the bathroom. She was recently started on lexapro. Also had some preceeding poor PO intake and diarrhea. Interval History  Seen and examined at bedside. Feels ok   Significant orthostasis    Plan:   - her orthostatic hypotension has improved with fluids. It is possible that the SSRI recently added could have exacerbated this. - would continue to hold loop diuretics. - meds vs volume related presfer BP in 150 than 120 at this time given the orthostatis. - renin ,marcos, metanephrines all processing    [facility] thanks you for the opportunity to serve this patient. Please call with any questions of concerns. Eliza Phillips MD  [facility]  [address], [address]  Fax: [phone]  Office: [phone]    Assessment:   #Labile hypertension:   Home BP 977X systolic   Here she was 906/25 and then dropeed to 88/54 with standing. Pulse did not change but she is on BB  Clinically not hypovolemic. She had not been taking the lasix. Had a fall at home with dizziness prior probably due to orthostatic hypotension. Its a new problem. Was not associated with pallor, palpitation or anxiety. Has previously had normal kidney function. Hypokalemia noted here but has not had previously. Marcos and renin in process but this will not    # positive orthostatic vitals. Lasix held, still on aldactone. On NS  Previousl Echo. G1DD 60-65%   AM cortisol 9.9   TSH normal  No UTI     #euvolemic, no edema. ROS:   Positives Listed Bold. All other remaining systems are negative.     Constitutional: fever, chills, weakness, weight change, fatigue,      Skin:  rash, pruritus, hair loss, bruising, dry skin, petechiae. Head, Face, Neck   headaches, swelling,  cervical adenopathy. Respiratory: shortness of breath, cough, or wheezing  Cardiovascular: chest pain, palpitations, dizzy, edema  Gastrointestinal: nausea, vomiting, diarrhea, constipation,belly pain    Yellow skin, blood in stool  Musculoskeletal:  back pain, muscle weakness, gait problems,       joint pain or swelling. Genitourinary:  dysuria, poor urine flow, flank pain, blood in urine  Neurologic:  vertigo, TIA'S, syncope, seizures, focal weakness  Psychosocial:  insomnia, anxiety, or depression. Additional positive findings: -     PMH:   Past medical history, surgical history, social history, family history are reviewed and updated as appropriate. Reviewed current medication list.   Allergies reviewed and updated as needed. PE:   Vitals:    03/20/21 0007   BP: (!) 120/52   Pulse: 76   Resp: 16   Temp: 97.8 °F (36.6 °C)   SpO2: 96%       General appearance: female in NAD, fully alert and oriented. Comfortable. HEENT: EOM intact, no icterus. Trachea is midline. Neck : No masses, appears symmetrical, no JVD, trachea is midline  Respiratory: Respiratory effort appears normal, bilateral equal chest rise, no wheeze, no crackles   Cardiovascular: Ausculation shows RR, no edema  Abdomen: No visible mass or tenderness, non distended. Musculoskeletal:  Joints with no swelling or deformity. Strength grossly normal  Skin:no rashes, ulcers, induration, no jaundice. Neuro: face symmetric, no focal deficits. Appropriate responses.  CN 2-12 grossly intact      Lab Results   Component Value Date    CREATININE <0.5 (L) 03/19/2021    BUN 6 (L) 03/19/2021     03/19/2021    K 3.7 03/19/2021     03/19/2021    CO2 23 03/19/2021      Lab Results   Component Value Date    WBC 4.2 03/19/2021    HGB 13.4 03/19/2021    HCT 38.1 03/19/2021    MCV 90.6 03/19/2021     03/19/2021     Lab Results   Component Value Date    PTH 29.8 12/14/2010    CALCIUM 8.7 03/19/2021    CAION 1.16 01/13/2016    PHOS 3.1 01/14/2016

## 2021-03-20 NOTE — PLAN OF CARE
Problem: Falls - Risk of:  Goal: Will remain free from falls  Description: Will remain free from falls  3/20/2021 1053 by Gal Arias RN  Outcome: Ongoing  Note: Fall precautions in place. Bed locked and in lowest position. Call light within reach.  Alarm on.   3/20/2021 0107 by Brandan Oconnell RN  Outcome: Ongoing  Goal: Absence of physical injury  Description: Absence of physical injury  3/20/2021 1053 by Gal Arias RN  Outcome: Ongoing  3/20/2021 0107 by Brandan Oconnell RN  Outcome: Ongoing     Problem: Infection:  Goal: Will remain free from infection  Description: Will remain free from infection  3/20/2021 1053 by Gal Arias RN  Outcome: Ongoing  3/20/2021 0107 by Brandan Oconnell RN  Outcome: Ongoing     Problem: Safety:  Goal: Free from accidental physical injury  Description: Free from accidental physical injury  3/20/2021 1053 by Gal Arias RN  Outcome: Ongoing  3/20/2021 0107 by Brandan Oconnell RN  Outcome: Ongoing  Goal: Free from intentional harm  Description: Free from intentional harm  3/20/2021 1053 by Gal Arias RN  Outcome: Ongoing  3/20/2021 0107 by Brandan Oconnell RN  Outcome: Ongoing     Problem: Daily Care:  Goal: Daily care needs are met  Description: Daily care needs are met  3/20/2021 1053 by Gal Arias RN  Outcome: Ongoing  3/20/2021 0107 by Brandan Oconnell RN  Outcome: Ongoing     Problem: Pain:  Goal: Patient's pain/discomfort is manageable  Description: Patient's pain/discomfort is manageable  3/20/2021 1053 by Gal Arias RN  Outcome: Ongoing  3/20/2021 0107 by Brandan Oconnell RN  Outcome: Ongoing     Problem: Skin Integrity:  Goal: Skin integrity will stabilize  Description: Skin integrity will stabilize  3/20/2021 1053 by Gal Arias RN  Outcome: Ongoing  3/20/2021 0107 by Brandan Oconnell RN  Outcome: Ongoing     Problem: Discharge Planning:  Goal: Patients continuum of care needs are met  Description: Patients continuum of care needs are met  3/20/2021 1053 by Alexsandra Apodaca RN  Outcome: Ongoing  3/20/2021 0107 by Tiarra Chamberlain RN  Outcome: Ongoing     Problem: Skin Integrity:  Goal: Will show no infection signs and symptoms  Description: Will show no infection signs and symptoms  3/20/2021 1053 by Alexsandra Apodaca RN  Outcome: Ongoing  3/20/2021 0107 by Tiarra Chamberlain RN  Outcome: Ongoing  Goal: Absence of new skin breakdown  Description: Absence of new skin breakdown  3/20/2021 1053 by Alexsandra Apodaca RN  Outcome: Ongoing  3/20/2021 0107 by Tiarra Chamberlain RN  Outcome: Ongoing

## 2021-03-20 NOTE — PLAN OF CARE
Problem: Falls - Risk of:  Goal: Will remain free from falls  Description: Will remain free from falls  3/20/2021 0107 by Darlin Bronson RN  Outcome: Ongoing  Bed locked and in lowest position. Bed alarm on. Non-skid socks on pt. Call light within reach. Siderails x3. Problem: Daily Care:  Goal: Daily care needs are met  Description: Daily care needs are met  3/20/2021 0107 by Darlin Bronson RN  Outcome: Ongoing  Pt expresses no needs at this time. Problem: Pain:  Goal: Patient's pain/discomfort is manageable  Description: Patient's pain/discomfort is manageable  3/20/2021 0107 by Darlin Bronson RN  Outcome: Ongoing  Pt able to report pain appropriately. PRN medications administered as ordered. Problem: Skin Integrity:  Goal: Absence of new skin breakdown  Description: Absence of new skin breakdown  3/20/2021 0107 by Darlin Bronson RN  Outcome: Ongoing  Pt able to ambulate to commode and turn self in bed. Heels elevated off bed. No skin breakdown noted.

## 2021-03-20 NOTE — DISCHARGE INSTR - COC
Continuity of Care Form    Patient Name: Katelin Miner   :  1940  MRN:  [id number]    Admit date:  3/16/2021  Discharge date:  2021    Code Status Order: Full Code   Advance Directives:   [address] [facility] Documentation       Date/Time Healthcare Directive Type of Healthcare Directive Copy in [address] Agent's Name Healthcare Agent's Phone Number    21 1824  No, patient does not have an advance directive for healthcare treatment -- -- -- -- --            Admitting Physician:  Lita Pickett MD  PCP: Antoinette Horne MD    Discharging Nurse: THE [facility] Unit/Room#: [address] Phone Number: [phone]    Emergency Contact:   Extended Emergency Contact Information  Primary Emergency Contact: Hero Sellers  Address: [address] Phone: [phone]  Relation: Spouse  Secondary Emergency Contact: [address], [address] [facility] Phone: [phone]  Relation: Child  Preferred language: English   needed?  No    Past Surgical History:  Past Surgical History:   Procedure Laterality Date    EYE SURGERY  1985    L eye - injury    JOINT REPLACEMENT      R TKR - Dr Jamari Velasquez       Immunization History:   Immunization History   Administered Date(s) Administered    Influenza Virus Vaccine 10/05/2012, 10/29/2013, 2014    Influenza Whole 10/04/2010, 10/07/2011    Influenza, High Dose (Fluzone 65 yrs and older) 2015, 2016, 2017, 10/22/2018    Influenza, Triv, inactivated, subunit, adjuvanted, IM (Fluad 65 yrs and older) 10/01/2019    Pneumococcal Conjugate 13-valent ([id number]) 2015    Pneumococcal Polysaccharide ([id number]) 2018    Td, unspecified formulation 2010    Zoster Live (Zostavax) 2012       Active Problems:  Patient Active Problem List   Diagnosis Code    Hypertension I10    Hyperlipidemia E78.5    Hypercalcemia E83.52    Generalized weakness R53.1    Edema R60.9    Stress F43.9    Bacteriuria, asymptomatic R82.71    Transient cerebral ischemia G45.9    Morbid obesity due to excess calories (HCC) E66.01    History of total knee arthroplasty, right Z96.651    Primary osteoarthritis of left knee M17.12    Seasonal affective disorder (HCC) F33.8    Depression, reactive F32.9    Hypertensive urgency I16.0    Orthostatic hypotension I95.1    Dizziness R42    Diarrhea R19.7    Fall at home W19. Rachael Ewing, Y92.009       Isolation/Infection:   Isolation            No Isolation          Patient Infection Status       Infection Onset Added Last Indicated Last Indicated By Review Planned Expiration Resolved Resolved By    None active    Resolved    C-diff Rule Out 03/16/21 03/16/21 03/16/21 Gastrointestinal Panel, Molecular (Ordered)   03/18/21 Marlo Lanier RN    COVID-19 Rule Out 03/16/21 03/16/21 03/16/21 COVID-19, Rapid (Ordered)   03/16/21 Rule-Out Test Resulted            Nurse Assessment:  Last Vital Signs: BP (!) 151/68   Pulse 85   Temp 99.4 °F (37.4 °C) (Oral)   Resp 16   Ht 5' 6\" (1.676 m)   Wt 217 lb 6 oz (98.6 kg)   SpO2 95%   BMI 35.09 kg/m²     Last documented pain score (0-10 scale): Pain Level: 2  Last Weight:   Wt Readings from Last 1 Encounters:   03/20/21 217 lb 6 oz (98.6 kg)     Mental Status:  oriented and alert    IV Access:  - None    Nursing Mobility/ADLs:  Walking   Assisted  Transfer  Assisted  Bathing  Assisted  Dressing  Assisted  Toileting  Assisted  Feeding  Independent  Med Admin  Assisted  Med Delivery   whole    Wound Care Documentation and Therapy:        Elimination:  Continence:   · Bowel:  Yes  · Bladder: Yes  Urinary Catheter: None   Colostomy/Ileostomy/Ileal Conduit: No       Date of Last BM: ***    Intake/Output Summary (Last 24 hours) at 3/20/2021 1108  Last data filed at 3/20/2021 0928  Gross per 24 hour   Intake 5308.75 ml   Output 2300 ml   Net 3008.75 ml I/O last 3 completed shifts: In: 6028.8 [P.O.:1980; I.V.:4048.8]  Out: 2600 [Urine:2600]    Safety Concerns:     History of Falls (last 30 days) and At Risk for Falls    Impairments/Disabilities:      Vision    Nutrition Therapy:  Current Nutrition Therapy:   - Oral Diet:  General    Routes of Feeding: Oral  Liquids: Thin Liquids  Daily Fluid Restriction: no  Last Modified Barium Swallow with Video (Video Swallowing Test): not done    Treatments at the Time of Hospital Discharge:   Respiratory Treatments: ***  Oxygen Therapy:  is not on home oxygen therapy. Ventilator:    - No ventilator support    Rehab Therapies: Physical Therapy and Occupational Therapy  Weight Bearing Status/Restrictions: No weight bearing restirctions  Other Medical Equipment (for information only, NOT a DME order):  walker  Other Treatments: ***    Patient's personal belongings (please select all that are sent with patient):  Tre GILL SIGNATURE:  Electronically signed by Juana Dewitt RN on 3/20/21 at 11:16 AM EDT    CASE MANAGEMENT/SOCIAL WORK SECTION    Inpatient Status Date: ***    Readmission Risk Assessment Score:  Readmission Risk              Risk of Unplanned Readmission:        10           Discharging to Facility/ Agency   · Name: [facility]  · Address: [address] [place] [address]  · Phone: [phone]  · Fax: [phone]      / signature: Electronically signed by Rusty Patten RN MSN on 3/20/2021 at 12:12 PM      PHYSICIAN SECTION    Prognosis: Good    Condition at Discharge: Stable    Rehab Potential (if transferring to Rehab): Good    Recommended Labs or Other Treatments After Discharge: meds as prescribed, follow-up with PCP, nephrology    Physician Certification: I certify the above information and transfer of Isael Burgos  is necessary for the continuing treatment of the diagnosis listed and that she requires Home Care for less 30 days.      Update Admission H&P: No change in H&P    PHYSICIAN SIGNATURE:  Electronically signed by Issa Sanchez MD on 3/20/21 at 11:09 AM EDT

## 2021-03-21 LAB
5 HIAA URINE (PER GM CREAT): 3 MG/GCR (ref 0–14)
5-HIAA 24 HOUR URINE: NORMAL MG/D (ref 0–15)
5-HIAA INTERPRETATION: NORMAL
5-HIAA URINE: 1.9 MG/L

## 2021-03-22 LAB
CREATININE 24 HOUR URINE: NORMAL MG/D (ref 500–1400)
CREATININE URINE: 68 MG/DL
HOURS COLLECTED: NORMAL
METANEPHRINE INTREP URINE: NORMAL
METANEPHRINE UG/G CRE: 81 UG/G CRT (ref 0–300)
METANEPHRINE, UR-PER VOL: 55 UG/L
METANEPHRINES URINE: NORMAL UG/D (ref 36–229)
NORMETANEPHRINE 24 HOUR URINE: NORMAL UG/D (ref 95–650)
NORMETANEPHRINE, (G/CRT): 296 UG/G CRT (ref 0–400)
NORMETANEPHRINES, NMOL/L: 201 UG/L
RENIN ACTIVITY: 0.9 NG/ML/HR
URINE TOTAL VOLUME: NORMAL

## 2021-03-24 ENCOUNTER — TELEPHONE (OUTPATIENT)
Dept: INTERNAL MEDICINE CLINIC | Age: 81
End: 2021-03-24

## 2021-03-24 NOTE — TELEPHONE ENCOUNTER
[facility] Transitions Initial Follow Up Call    Outreach made within 2 business days of discharge: Yes    Patient: Lucho Kaur Patient : 1940   MRN: <[id number]>  Reason for Admission: There are no discharge diagnoses documented for the most recent discharge.   Discharge Date: 3/20/21       Spoke with: unable to leave voicemail    Discharge department/facility: [facility]    Scheduled appointment with PCP within 7-14 days    Follow Up  Future Appointments   Date Time Provider Jj Anthony   2021  1:00 PM MD GREER Naj[facility]   2021 10:00 AM MD GREER N[facility]   2021 10:30 AM KIRA Castelan [place], [place]

## 2021-03-29 ENCOUNTER — TELEPHONE (OUTPATIENT)
Dept: INTERNAL MEDICINE CLINIC | Age: 81
End: 2021-03-29

## 2021-04-06 ENCOUNTER — OFFICE VISIT (OUTPATIENT)
Dept: INTERNAL MEDICINE CLINIC | Age: 81
End: 2021-04-06
Payer: MEDICARE

## 2021-04-06 VITALS
WEIGHT: 212 LBS | TEMPERATURE: 99.1 F | HEIGHT: 66 IN | SYSTOLIC BLOOD PRESSURE: 138 MMHG | DIASTOLIC BLOOD PRESSURE: 70 MMHG | BODY MASS INDEX: 34.07 KG/M2

## 2021-04-06 DIAGNOSIS — Z09 HOSPITAL DISCHARGE FOLLOW-UP: ICD-10-CM

## 2021-04-06 DIAGNOSIS — R82.71 BACTERIURIA, ASYMPTOMATIC: ICD-10-CM

## 2021-04-06 DIAGNOSIS — I10 ESSENTIAL HYPERTENSION: ICD-10-CM

## 2021-04-06 DIAGNOSIS — F32.9 DEPRESSION, REACTIVE: ICD-10-CM

## 2021-04-06 DIAGNOSIS — R53.1 GENERALIZED WEAKNESS: ICD-10-CM

## 2021-04-06 DIAGNOSIS — K59.00 OBSTIPATION: ICD-10-CM

## 2021-04-06 PROBLEM — R19.7 DIARRHEA: Status: RESOLVED | Noted: 2021-03-16 | Resolved: 2021-04-06

## 2021-04-06 PROBLEM — I16.0 HYPERTENSIVE URGENCY: Status: RESOLVED | Noted: 2021-03-16 | Resolved: 2021-04-06
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
## 2021-04-06 PROBLEM — R42 DIZZINESS: Status: RESOLVED | Noted: 2021-03-16 | Resolved: 2021-04-06

## 2021-04-06 PROBLEM — I95.1 ORTHOSTATIC HYPOTENSION: Status: RESOLVED | Noted: 2021-03-16 | Resolved: 2021-04-06
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
## 2021-04-06 PROCEDURE — 1123F ACP DISCUSS/DSCN MKR DOCD: CPT | Performed by: INTERNAL MEDICINE

## 2021-04-06 PROCEDURE — 1111F DSCHRG MED/CURRENT MED MERGE: CPT | Performed by: INTERNAL MEDICINE

## 2021-04-06 PROCEDURE — 99214 OFFICE O/P EST MOD 30 MIN: CPT | Performed by: INTERNAL MEDICINE

## 2021-04-06 PROCEDURE — 1090F PRES/ABSN URINE INCON ASSESS: CPT | Performed by: INTERNAL MEDICINE

## 2021-04-06 PROCEDURE — 1036F TOBACCO NON-USER: CPT | Performed by: INTERNAL MEDICINE

## 2021-04-06 PROCEDURE — G8427 DOCREV CUR MEDS BY ELIG CLIN: HCPCS | Performed by: INTERNAL MEDICINE

## 2021-04-06 PROCEDURE — 4040F PNEUMOC VAC/ADMIN/RCVD: CPT | Performed by: INTERNAL MEDICINE

## 2021-04-06 PROCEDURE — G8399 PT W/DXA RESULTS DOCUMENT: HCPCS | Performed by: INTERNAL MEDICINE

## 2021-04-06 PROCEDURE — G8417 CALC BMI ABV UP PARAM F/U: HCPCS | Performed by: INTERNAL MEDICINE

## 2021-04-06 RX ORDER — CIPROFLOXACIN 500 MG/1
500 TABLET, FILM COATED ORAL 2 TIMES DAILY
Qty: 10 TABLET | Refills: 3 | Status: SHIPPED | OUTPATIENT
Start: 2021-04-06 | End: 2021-06-14

## 2021-04-06 ASSESSMENT — PATIENT HEALTH QUESTIONNAIRE - PHQ9
1. LITTLE INTEREST OR PLEASURE IN DOING THINGS: 0
SUM OF ALL RESPONSES TO PHQ9 QUESTIONS 1 & 2: 0
SUM OF ALL RESPONSES TO PHQ QUESTIONS 1-9: 0
SUM OF ALL RESPONSES TO PHQ QUESTIONS 1-9: 0

## 2021-04-06 ASSESSMENT — ENCOUNTER SYMPTOMS
CONSTIPATION: 1
DIARRHEA: 0
NAUSEA: 0
SHORTNESS OF BREATH: 0

## 2021-04-06 NOTE — ASSESSMENT & PLAN NOTE
She did not try the medication. She believes she is starting to feel better as the weather improves. We will hold off on medication.

## 2021-04-06 NOTE — ASSESSMENT & PLAN NOTE
Hospital records reviewed. Discussed with patient. Medications reviewed. I believe she is feeling fatigued and shaky now from too much blood pressure medication. Home blood pressure readings are in the low 100s. Will adjust medication-see hypertension.

## 2021-04-06 NOTE — ASSESSMENT & PLAN NOTE
Improved but she still feels somewhat weak and occasionally lightheaded-suspect her new blood pressure medication. See hypertension.

## 2021-04-06 NOTE — PROGRESS NOTES
SUBJECTIVE:  Patient ID: Katelin Santana is an 68 y.o. female. HPI: Patient here today for the f/u of chronic problems-- see Problem List and associated comments. New issues or complaints include (also see Assessment for more details): Patient here for follow-up after hospitalization. She felt lightheaded when she got off the toilet and her knees collapsed underneath her. She did not pass out. No seizure-like activity. She then lost control of her bowels. No diaphoresis. She just felt weak and fatigued. EMS took her to the hospital where she had a thorough evaluation which has been reviewed. Her blood pressure was elevated upon arrival.  Medications were adjusted. She feels weaker and more fatigued since she has been home on the new medication regimen. Review of Systems   Constitutional: Positive for fatigue. Negative for fever. Respiratory: Negative for shortness of breath. Cardiovascular: Negative for chest pain and palpitations. Gastrointestinal: Positive for constipation. Negative for diarrhea and nausea. Neurological: Positive for weakness and light-headedness. Negative for dizziness. OBJECTIVE:    /70 (Site: Right Upper Arm)   Temp 99.1 °F (37.3 °C)   Ht 5' 6\" (1.676 m)   Wt 212 lb (96.2 kg)   BMI 34.22 kg/m²      Physical Exam  Constitutional:       Appearance: She is obese. She is not ill-appearing or diaphoretic. Eyes:      Extraocular Movements: Extraocular movements intact. Cardiovascular:      Rate and Rhythm: Normal rate and regular rhythm. Heart sounds: No murmur. No gallop. Pulmonary:      Effort: Pulmonary effort is normal. No respiratory distress. Breath sounds: Normal breath sounds. Abdominal:      General: Bowel sounds are normal.      Palpations: Abdomen is soft. Musculoskeletal:      Right lower leg: No edema. Left lower leg: No edema. Skin:     Coloration: Skin is not pale. Neurological:      General: No focal deficit present. Mental Status: She is oriented to person, place, and time. Psychiatric:         Mood and Affect: Mood normal.         Thought Content: Thought content normal.         Judgment: Judgment normal.         ASSESSMENT:       Encounter Diagnoses   Name Primary? [facility] discharge follow-up     Essential hypertension     Generalized weakness     Bacteriuria, asymptomatic     Depression, reactive    [facility] discharge follow-up  Hospital records reviewed. Discussed with patient. Medications reviewed. I believe she is feeling fatigued and shaky now from too much blood pressure medication. Home blood pressure readings are in the low 100s. Will adjust medication-see hypertension. Hypertension  Hospital records reviewed. Feels lightheaded or fatigued since coming home on new PPI regimen. We will decrease nifedipine to 60 mg once a day, not twice daily. Not taking hydralazine. Monitor BP and follow-up in 1 month. Generalized weakness  Improved but she still feels somewhat weak and occasionally lightheaded-suspect her new blood pressure medication. See hypertension. Bacteriuria, asymptomatic  Occasionally symptomatic. Gave her Cipro to keep on hand. Depression, reactive  She did not try the medication. She believes she is starting to feel better as the weather improves. We will hold off on medication. Obstipation  She has been using laxatives daily every evening for the last several days. We will stop WelChol and see if this improves. PLAN:See ASSESSMENT for evaluation & PLAN     No orders of the defined types were placed in this encounter.    , PMH, SH and FH reviewed and noted. Recent and past labs, tests and consultsalso reviewed. Recent or new meds also reviewed.

## 2021-05-06 PROBLEM — Z09 HOSPITAL DISCHARGE FOLLOW-UP: Status: RESOLVED | Noted: 2021-04-06 | Resolved: 2021-05-06

## 2021-05-11 DIAGNOSIS — E78.5 HYPERLIPIDEMIA, UNSPECIFIED HYPERLIPIDEMIA TYPE: ICD-10-CM

## 2021-05-11 DIAGNOSIS — R82.71 BACTERIURIA, ASYMPTOMATIC: ICD-10-CM

## 2021-05-11 DIAGNOSIS — R73.9 HYPERGLYCEMIA: ICD-10-CM

## 2021-05-11 DIAGNOSIS — I10 ESSENTIAL HYPERTENSION: ICD-10-CM

## 2021-05-11 LAB
A/G RATIO: 1.7 (ref 1.1–2.2)
ALBUMIN SERPL-MCNC: 4.3 G/DL (ref 3.4–5)
ALP BLD-CCNC: 91 U/L (ref 40–129)
ALT SERPL-CCNC: 7 U/L (ref 10–40)
ANION GAP SERPL CALCULATED.3IONS-SCNC: 14 MMOL/L (ref 3–16)
AST SERPL-CCNC: 11 U/L (ref 15–37)
BASOPHILS ABSOLUTE: 0.1 K/UL (ref 0–0.2)
BASOPHILS RELATIVE PERCENT: 1 %
BILIRUB SERPL-MCNC: 1.8 MG/DL (ref 0–1)
BILIRUBIN URINE: NEGATIVE
BLOOD, URINE: NEGATIVE
BUN BLDV-MCNC: 9 MG/DL (ref 7–20)
CALCIUM SERPL-MCNC: 9.5 MG/DL (ref 8.3–10.6)
CHLORIDE BLD-SCNC: 102 MMOL/L (ref 99–110)
CHOLESTEROL, TOTAL: 236 MG/DL (ref 0–199)
CLARITY: ABNORMAL
CO2: 26 MMOL/L (ref 21–32)
COLOR: YELLOW
CREAT SERPL-MCNC: 0.6 MG/DL (ref 0.6–1.2)
EOSINOPHILS ABSOLUTE: 0.2 K/UL (ref 0–0.6)
EOSINOPHILS RELATIVE PERCENT: 4 %
EPITHELIAL CELLS, UA: 10 /HPF (ref 0–5)
GFR AFRICAN AMERICAN: >60
GFR NON-AFRICAN AMERICAN: >60
GLOBULIN: 2.6 G/DL
GLUCOSE BLD-MCNC: 103 MG/DL (ref 70–99)
GLUCOSE URINE: NEGATIVE MG/DL
HCT VFR BLD CALC: 41.4 % (ref 36–48)
HDLC SERPL-MCNC: 60 MG/DL (ref 40–60)
HEMOGLOBIN: 14.3 G/DL (ref 12–16)
HYALINE CASTS: 1 /LPF (ref 0–8)
KETONES, URINE: NEGATIVE MG/DL
LDL CHOLESTEROL CALCULATED: 152 MG/DL
LEUKOCYTE ESTERASE, URINE: ABNORMAL
LYMPHOCYTES ABSOLUTE: 1.4 K/UL (ref 1–5.1)
LYMPHOCYTES RELATIVE PERCENT: 25.1 %
MCH RBC QN AUTO: 32 PG (ref 26–34)
MCHC RBC AUTO-ENTMCNC: 34.5 G/DL (ref 31–36)
MCV RBC AUTO: 92.8 FL (ref 80–100)
MICROSCOPIC EXAMINATION: YES
MONOCYTES ABSOLUTE: 0.4 K/UL (ref 0–1.3)
MONOCYTES RELATIVE PERCENT: 6.8 %
NEUTROPHILS ABSOLUTE: 3.6 K/UL (ref 1.7–7.7)
NEUTROPHILS RELATIVE PERCENT: 63.1 %
NITRITE, URINE: NEGATIVE
PDW BLD-RTO: 14.2 % (ref 12.4–15.4)
PH UA: 7.5 (ref 5–8)
PLATELET # BLD: 279 K/UL (ref 135–450)
PMV BLD AUTO: 9 FL (ref 5–10.5)
POTASSIUM SERPL-SCNC: 4.3 MMOL/L (ref 3.5–5.1)
PROTEIN UA: NEGATIVE MG/DL
RBC # BLD: 4.46 M/UL (ref 4–5.2)
RBC UA: 1 /HPF (ref 0–4)
SODIUM BLD-SCNC: 142 MMOL/L (ref 136–145)
SPECIFIC GRAVITY UA: 1.01 (ref 1–1.03)
TOTAL PROTEIN: 6.9 G/DL (ref 6.4–8.2)
TRIGL SERPL-MCNC: 119 MG/DL (ref 0–150)
URINE REFLEX TO CULTURE: ABNORMAL
URINE TYPE: ABNORMAL
UROBILINOGEN, URINE: 1 E.U./DL
VLDLC SERPL CALC-MCNC: 24 MG/DL
WBC # BLD: 5.7 K/UL (ref 4–11)
WBC UA: 5 /HPF (ref 0–5)

## 2021-05-12 LAB
ESTIMATED AVERAGE GLUCOSE: 93.9 MG/DL
HBA1C MFR BLD: 4.9 %
URINE CULTURE, ROUTINE: NORMAL

## 2021-05-17 ENCOUNTER — OFFICE VISIT (OUTPATIENT)
Dept: INTERNAL MEDICINE CLINIC | Age: 81
End: 2021-05-17
Payer: MEDICARE

## 2021-05-17 VITALS
HEIGHT: 66 IN | WEIGHT: 207 LBS | BODY MASS INDEX: 33.27 KG/M2 | DIASTOLIC BLOOD PRESSURE: 68 MMHG | SYSTOLIC BLOOD PRESSURE: 138 MMHG

## 2021-05-17 DIAGNOSIS — E78.5 HYPERLIPIDEMIA, UNSPECIFIED HYPERLIPIDEMIA TYPE: ICD-10-CM

## 2021-05-17 DIAGNOSIS — I10 ESSENTIAL HYPERTENSION: Primary | ICD-10-CM

## 2021-05-17 DIAGNOSIS — R63.4 WEIGHT LOSS: ICD-10-CM

## 2021-05-17 DIAGNOSIS — E83.52 HYPERCALCEMIA: ICD-10-CM

## 2021-05-17 DIAGNOSIS — F33.8 SEASONAL AFFECTIVE DISORDER (HCC): ICD-10-CM

## 2021-05-17 DIAGNOSIS — F32.9 DEPRESSION, REACTIVE: ICD-10-CM

## 2021-05-17 DIAGNOSIS — R53.1 GENERALIZED WEAKNESS: ICD-10-CM

## 2021-05-17 DIAGNOSIS — Z00.00 PREVENTATIVE HEALTH CARE: ICD-10-CM

## 2021-05-17 DIAGNOSIS — Z00.00 ENCOUNTER FOR ANNUAL WELLNESS VISIT (AWV) IN MEDICARE PATIENT: ICD-10-CM

## 2021-05-17 DIAGNOSIS — R73.9 HYPERGLYCEMIA: ICD-10-CM

## 2021-05-17 DIAGNOSIS — R82.71 BACTERIURIA, ASYMPTOMATIC: ICD-10-CM

## 2021-05-17 PROCEDURE — G8427 DOCREV CUR MEDS BY ELIG CLIN: HCPCS | Performed by: INTERNAL MEDICINE

## 2021-05-17 PROCEDURE — 1036F TOBACCO NON-USER: CPT | Performed by: INTERNAL MEDICINE

## 2021-05-17 PROCEDURE — 99214 OFFICE O/P EST MOD 30 MIN: CPT | Performed by: INTERNAL MEDICINE

## 2021-05-17 PROCEDURE — G8417 CALC BMI ABV UP PARAM F/U: HCPCS | Performed by: INTERNAL MEDICINE

## 2021-05-17 PROCEDURE — 1090F PRES/ABSN URINE INCON ASSESS: CPT | Performed by: INTERNAL MEDICINE

## 2021-05-17 PROCEDURE — G8399 PT W/DXA RESULTS DOCUMENT: HCPCS | Performed by: INTERNAL MEDICINE

## 2021-05-17 PROCEDURE — 4040F PNEUMOC VAC/ADMIN/RCVD: CPT | Performed by: INTERNAL MEDICINE

## 2021-05-17 PROCEDURE — 1123F ACP DISCUSS/DSCN MKR DOCD: CPT | Performed by: INTERNAL MEDICINE

## 2021-05-17 RX ORDER — NIFEDIPINE 30 MG/1
30 TABLET, EXTENDED RELEASE ORAL DAILY
Qty: 90 TABLET | Refills: 3 | Status: SHIPPED | OUTPATIENT
Start: 2021-05-17 | End: 2022-08-19

## 2021-05-17 ASSESSMENT — SOCIAL DETERMINANTS OF HEALTH (SDOH): HOW HARD IS IT FOR YOU TO PAY FOR THE VERY BASICS LIKE FOOD, HOUSING, MEDICAL CARE, AND HEATING?: NOT HARD AT ALL

## 2021-05-17 ASSESSMENT — ENCOUNTER SYMPTOMS
SHORTNESS OF BREATH: 0
GASTROINTESTINAL NEGATIVE: 1
TROUBLE SWALLOWING: 0

## 2021-05-17 NOTE — ASSESSMENT & PLAN NOTE
home BP readings are much lower than here. She feels still weak and lightheaded. We will decrease her nifedipine further from 60 mg daily to 30 mg daily. She has not had to use the hydralazine at all. She will call in 1 month with blood pressure readings. If still low will decrease carvedilol next.

## 2021-05-17 NOTE — ASSESSMENT & PLAN NOTE
occasional symptoms-she will take Cipro for 3 days, not 5. She states the Cipro constipates her she takes it too much.

## 2021-05-17 NOTE — ASSESSMENT & PLAN NOTE
somewhat better since decreasing her nifedipine-we will decrease the dose further. Recent labs okay.

## 2021-05-17 NOTE — PROGRESS NOTES
SUBJECTIVE:  Patient ID: Florine Primrose is an 68 y.o. female. HPI: Patient here today for the f/u of chronic problems-- see Problem List and associated comments. New issues or complaints include (also see Assessment for more details): Here for routine follow-up. She was just here a month ago after being in the hospital.  Those records are again reviewed. She has felt weak and lightheaded since then. She claims she is regaining some weight slowly and her appetite is picking up. Review of Systems   Constitutional: Positive for fatigue and unexpected weight change. Negative for appetite change. HENT: Negative for trouble swallowing. Respiratory: Negative for shortness of breath. Cardiovascular: Negative. Gastrointestinal: Negative. Genitourinary: Positive for dysuria. Musculoskeletal: Positive for arthralgias. Neurological: Negative. Negative for light-headedness. Hematological: Negative for adenopathy. Psychiatric/Behavioral: Negative for dysphoric mood. OBJECTIVE:    /68 (Site: Right Upper Arm)   Ht 5' 6\" (1.676 m)   Wt 207 lb (93.9 kg)   BMI 33.41 kg/m²      Physical Exam  Constitutional:       General: She is not in acute distress. Appearance: She is not ill-appearing or diaphoretic. HENT:      Head: Normocephalic and atraumatic. Eyes:      General: No scleral icterus. Extraocular Movements: Extraocular movements intact. Neck:      Vascular: No carotid bruit. Cardiovascular:      Rate and Rhythm: Normal rate and regular rhythm. Heart sounds: No murmur heard. No gallop. Pulmonary:      Effort: Pulmonary effort is normal. No respiratory distress. Breath sounds: Normal breath sounds. Abdominal:      General: Bowel sounds are normal. There is no distension or abdominal bruit. Palpations: Abdomen is soft. There is no hepatomegaly or splenomegaly. Tenderness: There is no abdominal tenderness.    Musculoskeletal:      Right lower leg: No edema. Left lower leg: No edema. Lymphadenopathy:      Head:      Right side of head: No submandibular adenopathy. Left side of head: No submandibular adenopathy. Cervical: No cervical adenopathy. Upper Body:      Right upper body: No supraclavicular adenopathy. Left upper body: No supraclavicular adenopathy. Skin:     Coloration: Skin is not jaundiced or pale. Neurological:      General: No focal deficit present. Mental Status: She is alert and oriented to person, place, and time. Cranial Nerves: Cranial nerves are intact. Motor: Motor function is intact. No tremor. Coordination: Coordination is intact. Psychiatric:         Mood and Affect: Mood normal.         Thought Content: Thought content normal.         Judgment: Judgment normal.         ASSESSMENT:       Encounter Diagnoses   Name Primary?  Essential hypertension Yes    Hyperlipidemia, unspecified hyperlipidemia type     Hypercalcemia     Encounter for annual wellness visit (AWV) in Medicare patient     [facility]     Hyperglycemia     Weight loss     Generalized weakness     Bacteriuria, asymptomatic     Depression, reactive     Seasonal affective disorder (HCC)        Hypertension    home BP readings are much lower than here. She feels still weak and lightheaded. We will decrease her nifedipine further from 60 mg daily to 30 mg daily. She has not had to use the hydralazine at all. She will call in 1 month with blood pressure readings. If still low will decrease carvedilol next. Weight loss    unexpected since her hospitalization over a month ago. However lately her appetite has returned significantly and she is eating better. She claims to have regained a few pounds already. Discussed with her the concern for weight loss for an unknown reason.   She will monitor her weight at home and if she continues to lose then more evaluation will be necessary-possibly CT scan, and Cologuard. Generalized weakness    somewhat better since decreasing her nifedipine-we will decrease the dose further. Recent labs okay. Hyperlipidemia    labs okay-no Rx    Hypercalcemia    normal-9.5    Bacteriuria, asymptomatic    occasional symptoms-she will take Cipro for 3 days, not 5. She states the Cipro constipates her she takes it too much. Depression, reactive    clinically improving. As spring and summer arrives she feels like she is doing better. Declines any Rx. Seasonal affective disorder ([address]    improving. Declines any Rx. PLAN:See ASSESSMENT for evaluation & PLAN     Orders Placed This Encounter   Procedures    COMPREHENSIVE METABOLIC PANEL     Standing Status:   Future     Standing Expiration Date:   5/17/2022    CBC WITH AUTO DIFFERENTIAL     Standing Status:   Future     Standing Expiration Date:   5/17/2022    LIPID PANEL     Standing Status:   Future     Standing Expiration Date:   5/17/2022     Order Specific Question:   Is Patient Fasting?/# of Hours     Answer:   mid    HEMOGLOBIN A1C     Standing Status:   Future     Standing Expiration Date:   5/17/2022    URINALYSIS WITH MICROSCOPIC     Standing Status:   Future     Standing Expiration Date:   5/17/2022     Order Specific Question:   SPECIFY(EX-CATH,MIDSTREAM,CYSTO,ETC)? Answer:   mid     , PMH, SH and FH reviewed and noted. Recent and past labs, tests and consultsalso reviewed. Recent or new meds also reviewed.

## 2021-05-17 NOTE — ASSESSMENT & PLAN NOTE
clinically improving. As spring and summer arrives she feels like she is doing better. Declines any Rx.

## 2021-05-25 ENCOUNTER — OFFICE VISIT (OUTPATIENT)
Dept: ORTHOPEDIC SURGERY | Age: 81
End: 2021-05-25
Payer: MEDICARE

## 2021-05-25 VITALS — BODY MASS INDEX: 33.27 KG/M2 | RESPIRATION RATE: 16 BRPM | WEIGHT: 207 LBS | HEIGHT: 66 IN

## 2021-05-25 DIAGNOSIS — M17.12 PRIMARY OSTEOARTHRITIS OF LEFT KNEE: Primary | ICD-10-CM

## 2021-05-25 PROCEDURE — G8427 DOCREV CUR MEDS BY ELIG CLIN: HCPCS | Performed by: PHYSICIAN ASSISTANT

## 2021-05-25 PROCEDURE — 1123F ACP DISCUSS/DSCN MKR DOCD: CPT | Performed by: PHYSICIAN ASSISTANT

## 2021-05-25 PROCEDURE — 20610 DRAIN/INJ JOINT/BURSA W/O US: CPT | Performed by: PHYSICIAN ASSISTANT

## 2021-05-25 PROCEDURE — 99213 OFFICE O/P EST LOW 20 MIN: CPT | Performed by: PHYSICIAN ASSISTANT

## 2021-05-25 PROCEDURE — 4040F PNEUMOC VAC/ADMIN/RCVD: CPT | Performed by: PHYSICIAN ASSISTANT

## 2021-05-25 PROCEDURE — G8399 PT W/DXA RESULTS DOCUMENT: HCPCS | Performed by: PHYSICIAN ASSISTANT

## 2021-05-25 PROCEDURE — 1090F PRES/ABSN URINE INCON ASSESS: CPT | Performed by: PHYSICIAN ASSISTANT

## 2021-05-25 PROCEDURE — 1036F TOBACCO NON-USER: CPT | Performed by: PHYSICIAN ASSISTANT

## 2021-05-25 PROCEDURE — G8417 CALC BMI ABV UP PARAM F/U: HCPCS | Performed by: PHYSICIAN ASSISTANT

## 2021-05-26 NOTE — PROGRESS NOTES
Subjective:      Patient ID: Kristal Loving is a 80 y.o.  female. Chief Complaint   Patient presents with    Follow-up     left knee        HPI: She is here for evaluation and treatment of left knee pain related to arthritis. She states the previous injection which was performed 1/26/2021 helped relieve the knee symptoms. The knee pain has gradually returned. There has not been a recent injury. Pain is 5/10. Pain is worse with activity. Pain improves somewhat with rest and elevation. It has been over a year since x-rays and clinical exam has been performed for knee arthritic complaints. History of right knee arthroplasty. Not interested in left knee arthroplasty. Review of Systems:   Negative for fever or chills. Negative for numbness or tingling around the knee.     Past Medical History:   Diagnosis Date    Arthritis     Cataract     High blood pressure     Hyperlipidemia     stress test 2007    normal       Family History   Problem Relation Age of Onset    Cancer Other     High Blood Pressure Other        Past Surgical History:   Procedure Laterality Date    EYE SURGERY  1985    L eye - injury    JOINT REPLACEMENT      R TKR - Dr Manav Webb Not on file   Tobacco Use    Smoking status: Never Smoker    Smokeless tobacco: Never Used   Substance and Sexual Activity    Alcohol use: No     Alcohol/week: 0.0 standard drinks    Drug use: No    Sexual activity: Not on file       Current Outpatient Medications   Medication Sig Dispense Refill    NIFEdipine (PROCARDIA XL) 30 MG extended release tablet Take 1 tablet by mouth daily 90 tablet 3    ciprofloxacin (CIPRO) 500 MG tablet Take 1 tablet by mouth 2 times daily 10 tablet 3    hydrALAZINE (APRESOLINE) 50 MG tablet Take 1 tablet by mouth every 8 hours as needed (for blood pressure > 180) 90 tablet 3    losartan (COZAAR) 100 MG tablet TAKE 1 TABLET BY MOUTH  DAILY 90 tablet 3    colesevelam (WELCHOL) 625 MG tablet TAKE 3 TABLETS BY MOUTH TWO TIMES DAILY WITH MEALS 540 tablet 3    spironolactone (ALDACTONE) 25 MG tablet TAKE 1 TABLET BY MOUTH  DAILY 90 tablet 2    carvedilol (COREG) 25 MG tablet TAKE 1 TABLET BY MOUTH TWO  TIMES DAILY 180 tablet 3    traMADol (ULTRAM) 50 MG tablet Take 1 tablet by mouth every 8 hours as needed for Pain 270 tablet 1    aspirin 81 MG tablet Take 81 mg by mouth daily.  Ascorbic Acid (VITAMIN C) 1000 MG tablet Take 1,000 mg by mouth daily.  calcium-vitamin D 500 MG tablet Take 1 tablet by mouth 2 times daily.  Multiple Vitamin (MULTIVITAMIN PO) Take 1 tablet by mouth daily        No current facility-administered medications for this visit. Objective:   She is alert, oriented x 3, pleasant, well nourished, developed and in no acute distress. Resp 16   Ht 5' 6\" (1.676 m)   Wt 207 lb (93.9 kg)   BMI 33.41 kg/m²      KNEE EXAM:  Examination of the left knee shows: There is not erythema. ROM- decreased range of motion is noted. There is mild to moderate pain associated with ROM testing. Extensor Mechanism is  intact. Examination of the lower extremities are intact with sensation to light touch. Motor testing  5/5 in all major motor groups of the lower extremities. Gait is normal heel to toe. Gait is antalgic. Negative Bourgeois's Sign. SLR negative. Intact perfusion to both lower extremities. No cyanosis. Digits are warm to touch, capillary refill is less than 2 seconds. There is mild edema noted BLE. The skin to be intact without lacerations or abrasions. No significant erythema. No rashes or skin lesions. X Rays: was performed in the office today:   AP Standing, Lateral and Sunrise views of left knee:   No acute fractures or dislocations noted. Knee x-rays demonstrate osteoarthritis. Medial compartment-    2/4 Kellgren and Leonel Classification.    Lateral compartment-    2/4 Kellgren and Mickeal [place] Follow up- 3-4 months   Call or return to clinic if these symptoms worsen or fail to improve as anticipated.

## 2021-06-08 ENCOUNTER — TELEPHONE (OUTPATIENT)
Dept: INTERNAL MEDICINE CLINIC | Age: 81
End: 2021-06-08

## 2021-06-08 DIAGNOSIS — Z12.11 SCREENING FOR COLON CANCER: Primary | ICD-10-CM

## 2021-06-08 DIAGNOSIS — R63.4 WEIGHT LOSS: ICD-10-CM

## 2021-06-08 NOTE — TELEPHONE ENCOUNTER
Patient would like you [facility] to call her, she has blood pressure and weight results. Please call to advise.

## 2021-06-08 NOTE — TELEPHONE ENCOUNTER
Blood pressure looks okay. I am concerned about her continuing weight loss.   Send her a Cologuard, and also please order CT scan of her chest abdomen and pelvis without contrast, use diagnosis of weight loss

## 2021-06-08 NOTE — TELEPHONE ENCOUNTER
Called pt back     Weights:  5/17/21  208    6/7/21  201      Blood Pressure Readings.    5/18  126/60    5/19  137/63    5/23  140/66    5/24  135/66     5/31  122/74    6/7  122/66

## 2021-06-10 ENCOUNTER — HOSPITAL ENCOUNTER (OUTPATIENT)
Dept: CT IMAGING | Age: 81
Discharge: HOME | End: 2021-06-10
Payer: MEDICARE

## 2021-06-10 DIAGNOSIS — R63.4 WEIGHT LOSS: ICD-10-CM

## 2021-06-10 PROCEDURE — 74176 CT ABD & PELVIS W/O CONTRAST: CPT

## 2021-06-10 PROCEDURE — 6360000004 HC RX CONTRAST MEDICATION: Performed by: INTERNAL MEDICINE

## 2021-06-10 RX ADMIN — IOHEXOL 50 ML: 240 INJECTION, SOLUTION INTRATHECAL; INTRAVASCULAR; INTRAVENOUS; ORAL at 07:34

## 2021-06-10 NOTE — TELEPHONE ENCOUNTER
Pt called [facility] to return Ratna Simple phone call regarding CT and Cologard. Pt informed all has been ordered and can call to schedule CT.

## 2021-06-11 ENCOUNTER — TELEPHONE (OUTPATIENT)
Dept: INTERNAL MEDICINE CLINIC | Age: 81
End: 2021-06-11

## 2021-06-11 RX ORDER — CARVEDILOL 25 MG/1
TABLET ORAL
Qty: 180 TABLET | Refills: 3 | Status: SHIPPED | OUTPATIENT
Start: 2021-06-11 | End: 2022-03-26

## 2021-06-14 ENCOUNTER — OFFICE VISIT (OUTPATIENT)
Dept: INTERNAL MEDICINE CLINIC | Age: 81
End: 2021-06-14
Payer: MEDICARE

## 2021-06-14 VITALS
HEIGHT: 66 IN | DIASTOLIC BLOOD PRESSURE: 70 MMHG | BODY MASS INDEX: 32.3 KG/M2 | WEIGHT: 201 LBS | SYSTOLIC BLOOD PRESSURE: 134 MMHG

## 2021-06-14 DIAGNOSIS — F32.9 DEPRESSION, REACTIVE: ICD-10-CM

## 2021-06-14 DIAGNOSIS — E83.52 HYPERCALCEMIA: ICD-10-CM

## 2021-06-14 DIAGNOSIS — R73.9 HYPERGLYCEMIA: ICD-10-CM

## 2021-06-14 DIAGNOSIS — R63.4 WEIGHT LOSS: ICD-10-CM

## 2021-06-14 DIAGNOSIS — I10 ESSENTIAL HYPERTENSION, BENIGN: ICD-10-CM

## 2021-06-14 DIAGNOSIS — I10 ESSENTIAL HYPERTENSION: Primary | ICD-10-CM

## 2021-06-14 DIAGNOSIS — R53.1 GENERALIZED WEAKNESS: ICD-10-CM

## 2021-06-14 DIAGNOSIS — I10 ESSENTIAL HYPERTENSION: ICD-10-CM

## 2021-06-14 LAB
A/G RATIO: 1.7 (ref 1.1–2.2)
ALBUMIN SERPL-MCNC: 4.3 G/DL (ref 3.4–5)
ALP BLD-CCNC: 87 U/L (ref 40–129)
ALT SERPL-CCNC: 9 U/L (ref 10–40)
ANION GAP SERPL CALCULATED.3IONS-SCNC: 18 MMOL/L (ref 3–16)
AST SERPL-CCNC: 13 U/L (ref 15–37)
BASOPHILS ABSOLUTE: 0 K/UL (ref 0–0.2)
BASOPHILS RELATIVE PERCENT: 0.7 %
BILIRUB SERPL-MCNC: 1.8 MG/DL (ref 0–1)
BUN BLDV-MCNC: 8 MG/DL (ref 7–20)
CALCIUM SERPL-MCNC: 9.7 MG/DL (ref 8.3–10.6)
CHLORIDE BLD-SCNC: 101 MMOL/L (ref 99–110)
CO2: 20 MMOL/L (ref 21–32)
CORTISOL TOTAL: 13.6 UG/DL
CREAT SERPL-MCNC: 0.7 MG/DL (ref 0.6–1.2)
EOSINOPHILS ABSOLUTE: 0.1 K/UL (ref 0–0.6)
EOSINOPHILS RELATIVE PERCENT: 2.3 %
GFR AFRICAN AMERICAN: >60
GFR NON-AFRICAN AMERICAN: >60
GLOBULIN: 2.6 G/DL
GLUCOSE BLD-MCNC: 101 MG/DL (ref 70–99)
HCT VFR BLD CALC: 41.4 % (ref 36–48)
HEMOGLOBIN: 14.2 G/DL (ref 12–16)
LYMPHOCYTES ABSOLUTE: 1.5 K/UL (ref 1–5.1)
LYMPHOCYTES RELATIVE PERCENT: 24.2 %
MCH RBC QN AUTO: 31.9 PG (ref 26–34)
MCHC RBC AUTO-ENTMCNC: 34.3 G/DL (ref 31–36)
MCV RBC AUTO: 92.9 FL (ref 80–100)
MONOCYTES ABSOLUTE: 0.4 K/UL (ref 0–1.3)
MONOCYTES RELATIVE PERCENT: 6.6 %
NEUTROPHILS ABSOLUTE: 4.1 K/UL (ref 1.7–7.7)
NEUTROPHILS RELATIVE PERCENT: 66.2 %
PDW BLD-RTO: 14.1 % (ref 12.4–15.4)
PLATELET # BLD: 224 K/UL (ref 135–450)
PMV BLD AUTO: 9.5 FL (ref 5–10.5)
POTASSIUM SERPL-SCNC: 4.3 MMOL/L (ref 3.5–5.1)
RBC # BLD: 4.46 M/UL (ref 4–5.2)
SODIUM BLD-SCNC: 139 MMOL/L (ref 136–145)
T4 FREE: 1.1 NG/DL (ref 0.9–1.8)
TOTAL PROTEIN: 6.9 G/DL (ref 6.4–8.2)
TSH REFLEX FT4: 4.4 UIU/ML (ref 0.27–4.2)
WBC # BLD: 6.2 K/UL (ref 4–11)

## 2021-06-14 PROCEDURE — 99214 OFFICE O/P EST MOD 30 MIN: CPT | Performed by: INTERNAL MEDICINE

## 2021-06-14 PROCEDURE — 1123F ACP DISCUSS/DSCN MKR DOCD: CPT | Performed by: INTERNAL MEDICINE

## 2021-06-14 PROCEDURE — 1090F PRES/ABSN URINE INCON ASSESS: CPT | Performed by: INTERNAL MEDICINE

## 2021-06-14 PROCEDURE — 4040F PNEUMOC VAC/ADMIN/RCVD: CPT | Performed by: INTERNAL MEDICINE

## 2021-06-14 PROCEDURE — G8417 CALC BMI ABV UP PARAM F/U: HCPCS | Performed by: INTERNAL MEDICINE

## 2021-06-14 PROCEDURE — G8399 PT W/DXA RESULTS DOCUMENT: HCPCS | Performed by: INTERNAL MEDICINE

## 2021-06-14 PROCEDURE — G8427 DOCREV CUR MEDS BY ELIG CLIN: HCPCS | Performed by: INTERNAL MEDICINE

## 2021-06-14 PROCEDURE — 1036F TOBACCO NON-USER: CPT | Performed by: INTERNAL MEDICINE

## 2021-06-14 ASSESSMENT — PATIENT HEALTH QUESTIONNAIRE - PHQ9
SUM OF ALL RESPONSES TO PHQ QUESTIONS 1-9: 0
2. FEELING DOWN, DEPRESSED OR HOPELESS: 0
SUM OF ALL RESPONSES TO PHQ9 QUESTIONS 1 & 2: 0
SUM OF ALL RESPONSES TO PHQ QUESTIONS 1-9: 0
SUM OF ALL RESPONSES TO PHQ QUESTIONS 1-9: 0
1. LITTLE INTEREST OR PLEASURE IN DOING THINGS: 0

## 2021-06-14 ASSESSMENT — ENCOUNTER SYMPTOMS: RESPIRATORY NEGATIVE: 1

## 2021-06-14 NOTE — ASSESSMENT & PLAN NOTE
she still has depression-like symptoms with lack of ambition, sleep disturbance and lack of energy. Checking labs. CT scan was okay. She could not tolerate Lexapro. Rechecking some labs today, and if okay then we will try to get her on a different antidepressant.

## 2021-06-14 NOTE — PROGRESS NOTES
SUBJECTIVE:  Patient ID: Vanessa Rivera is an 80 y.o. female. HPI: Patient here today for the f/u of chronic problems-- see Problem List and associated comments. New issues or complaints include (also see Assessment for more details): Follows up today for her depression. She still feels depressed-could not tolerate the Lexapro. Appetite is good. There is some sleep disturbance. She is not suicidal.  She cannot explain how she feels except she she notes that she feels like her energy level is low and her mental ambition is nonexistent. She has never quite felt like this before. She has good support from her . She complains about the weather, cicadas, and the world in general.    Review of Systems   Constitutional: Negative for appetite change. Respiratory: Negative. Cardiovascular: Negative. Psychiatric/Behavioral: Positive for decreased concentration, dysphoric mood and sleep disturbance. Negative for suicidal ideas. OBJECTIVE:    /70 (Site: Right Upper Arm)   Ht 5' 6\" (1.676 m)   Wt 201 lb (91.2 kg)   BMI 32.44 kg/m²      Physical Exam  Constitutional:       Appearance: Normal appearance. She is not ill-appearing or toxic-appearing. Cardiovascular:      Rate and Rhythm: Normal rate and regular rhythm. Pulmonary:      Effort: Pulmonary effort is normal. No respiratory distress. Skin:     Coloration: Skin is not pale. Neurological:      General: No focal deficit present. Mental Status: She is alert and oriented to person, place, and time. Psychiatric:         Mood and Affect: Mood is depressed. Mood is not anxious. Speech: Speech normal.         Behavior: Behavior is not agitated or withdrawn. Thought Content: Thought content does not include homicidal or suicidal ideation. Cognition and Memory: Cognition normal.         ASSESSMENT:       Encounter Diagnoses   Name Primary?     Essential hypertension Yes    Hypercalcemia     Hyperglycemia  Generalized weakness     Essential hypertension, benign     Weight loss     Depression, reactive        Weight loss    appetite is good. Checking labs today. Cologuard still pending. CT scan left chest abdomen pelvis was okay. Depression, reactive    she still has depression-like symptoms with lack of ambition, sleep disturbance and lack of energy. Checking labs. CT scan was okay. She could not tolerate Lexapro. Rechecking some labs today, and if okay then we will try to get her on a different antidepressant. Generalized weakness    still has symptoms of feeling weak. Depression symptoms. Checking labs today. Hypertension    BP okay since decreasing nifedipine. She has not used hydralazine at all. PLAN:See ASSESSMENT for evaluation & PLAN     Orders Placed This Encounter   Procedures    CBC Auto Differential     Standing Status:   Future     Number of Occurrences:   1     Standing Expiration Date:   6/14/2022    Comprehensive Metabolic Panel     Standing Status:   Future     Number of Occurrences:   1     Standing Expiration Date:   6/14/2022    TSH WITH REFLEX TO FT4     Standing Status:   Future     Number of Occurrences:   1     Standing Expiration Date:   6/14/2022    Hemoglobin A1C     Standing Status:   Future     Number of Occurrences:   1     Standing Expiration Date:   6/14/2022    Cortisol Total     Standing Status:   Future     Number of Occurrences:   1     Standing Expiration Date:   6/14/2022     Order Specific Question:   8AM or 4PM?     Answer:   NA     , PMH, SH and FH reviewed and noted. Recent and past labs, tests and consultsalso reviewed. Recent or new meds also reviewed.

## 2021-06-15 LAB
ESTIMATED AVERAGE GLUCOSE: 93.9 MG/DL
HBA1C MFR BLD: 4.9 %

## 2021-06-23 ENCOUNTER — TELEPHONE (OUTPATIENT)
Dept: INTERNAL MEDICINE CLINIC | Age: 81
End: 2021-06-23

## 2021-06-23 NOTE — TELEPHONE ENCOUNTER
----- Message from April Juni sent at 6/23/2021 12:41 PM EDT -----  Subject: Message to Provider    QUESTIONS  Information for Provider? patient mailed martínezrd test last thursday 6/17, patient called to inquire about test results   ---------------------------------------------------------------------------  --------------  CALL BACK INFO  What is the best way for the office to contact you? OK to leave message on   voicemail  Preferred Call Back Phone Number? [phone]  ---------------------------------------------------------------------------  --------------  SCRIPT ANSWERS  Relationship to Patient?  Self

## 2021-09-23 ENCOUNTER — OFFICE VISIT (OUTPATIENT)
Dept: ORTHOPEDIC SURGERY | Age: 81
End: 2021-09-23
Payer: MEDICARE

## 2021-09-23 VITALS — RESPIRATION RATE: 16 BRPM | HEIGHT: 66 IN | BODY MASS INDEX: 32.3 KG/M2 | WEIGHT: 201 LBS

## 2021-09-23 DIAGNOSIS — M17.12 PRIMARY OSTEOARTHRITIS OF LEFT KNEE: Primary | ICD-10-CM

## 2021-09-23 PROCEDURE — 20610 DRAIN/INJ JOINT/BURSA W/O US: CPT | Performed by: PHYSICIAN ASSISTANT

## 2021-09-23 NOTE — PROGRESS NOTES
Subjective:      Patient ID: Chris Paz is a 80 y.o.  female. Chief Complaint   Patient presents with    Knee Pain     left knee pain        HPI: She is here for evaluation and treatment of left knee pain related to arthritis. She states the previous injection which was performed 5/25/2021  helped relieve the knee symptoms. She knee pain has gradually returned. She denies recent injury. Pain is 6/10. Pain is worse with weightbearing activities. Pain improves somewhat with rest and elevation. Review of Systems:   She denies fever or chills. She denies numbness or tingling around the knee.     Past Medical History:   Diagnosis Date    Arthritis     Cataract     High blood pressure     Hyperlipidemia     stress test 2007    normal       Family History   Problem Relation Age of Onset    Cancer Other     High Blood Pressure Other        Past Surgical History:   Procedure Laterality Date    EYE SURGERY  1985    L eye - injury    JOINT REPLACEMENT      R TKR - Dr Ricardo Lamb Not on file   Tobacco Use    Smoking status: Never Smoker    Smokeless tobacco: Never Used   Substance and Sexual Activity    Alcohol use: No     Alcohol/week: 0.0 standard drinks    Drug use: No    Sexual activity: Not on file       Current Outpatient Medications   Medication Sig Dispense Refill    carvedilol (COREG) 25 MG tablet Take 1 Tablet by mouth two times daily 180 tablet 3    NIFEdipine (PROCARDIA XL) 30 MG extended release tablet Take 1 tablet by mouth daily 90 tablet 3    losartan (COZAAR) 100 MG tablet TAKE 1 TABLET BY MOUTH  DAILY 90 tablet 3    colesevelam (WELCHOL) 625 MG tablet TAKE 3 TABLETS BY MOUTH TWO TIMES DAILY WITH MEALS 540 tablet 3    spironolactone (ALDACTONE) 25 MG tablet TAKE 1 TABLET BY MOUTH  DAILY 90 tablet 2    traMADol (ULTRAM) 50 MG tablet Take 1 tablet by mouth every 8 hours as needed for Pain 270 tablet 1    aspirin 81 MG tablet Take 81 mg by mouth daily.  Ascorbic Acid (VITAMIN C) 1000 MG tablet Take 1,000 mg by mouth daily.  calcium-vitamin D 500 MG tablet Take 1 tablet by mouth 2 times daily.  Multiple Vitamin (MULTIVITAMIN PO) Take 1 tablet by mouth daily        No current facility-administered medications for this visit. Allergies   Allergen Reactions    Codeine Rash    Sulfa Antibiotics Anaphylaxis and Other (See Comments)    Atorvastatin      Reaction: Increases LFT's        Objective:   She is alert, oriented x 3, pleasant, well nourished, developed and in no acute distress. Resp 16   Ht 5' 6\" (1.676 m)   Wt 201 lb (91.2 kg)   BMI 32.44 kg/m²      Examination of the left knee shows: There is not erythema. ROM- decreased range of motion is noted. There is mild to moderate pain associated with ROM testing. Extensor Mechanism is intact. X Rays: was not performed in the office today:       Diagnosis       ICD-10-CM    1. Primary osteoarthritis of left knee  M17.12 [id number] - [place] DRAIN/INJECT LARGE JOINT/BURSA     [place] TRIAMCINOLONE ACETONIDE INJ        Assessment/ Plan:     Assessment:  Moderate to advanced arthritis involving left knee. She has done well with previous left knee intra-articular steroid injections. She is not interested in total knee arthroplasty at this time. She had a bad experience with her right knee arthroplasty. I had an extensive discussion with Ms. Cristian Bro  regarding the natural history, etiology, and long term consequences of her condition. I have presented reasonable alternatives to the patient's proposed care, treatment, and services. Risks and benefits of the treatment options also reviewed in detail. I have outlined a treatment plan with them. She has had full opportunity to ask her questions. I have answered them all to her satisfaction. I feel that Ms. Cristian Bro understands our discussion today.     Weight loss, activity modification, home exercise therapy

## 2021-10-14 ENCOUNTER — OFFICE VISIT (OUTPATIENT)
Dept: INTERNAL MEDICINE CLINIC | Age: 81
End: 2021-10-14
Payer: MEDICARE

## 2021-10-14 VITALS
DIASTOLIC BLOOD PRESSURE: 70 MMHG | SYSTOLIC BLOOD PRESSURE: 132 MMHG | HEIGHT: 66 IN | BODY MASS INDEX: 30.47 KG/M2 | WEIGHT: 189.6 LBS

## 2021-10-14 DIAGNOSIS — Z23 NEED FOR INFLUENZA VACCINATION: Primary | ICD-10-CM

## 2021-10-14 DIAGNOSIS — R63.4 WEIGHT LOSS: ICD-10-CM

## 2021-10-14 DIAGNOSIS — K59.00 OBSTIPATION: ICD-10-CM

## 2021-10-14 DIAGNOSIS — F32.9 DEPRESSION, REACTIVE: ICD-10-CM

## 2021-10-14 DIAGNOSIS — R82.71 BACTERIURIA, ASYMPTOMATIC: ICD-10-CM

## 2021-10-14 DIAGNOSIS — I10 PRIMARY HYPERTENSION: ICD-10-CM

## 2021-10-14 DIAGNOSIS — E78.5 HYPERLIPIDEMIA, UNSPECIFIED HYPERLIPIDEMIA TYPE: ICD-10-CM

## 2021-10-14 PROCEDURE — G8417 CALC BMI ABV UP PARAM F/U: HCPCS | Performed by: INTERNAL MEDICINE

## 2021-10-14 PROCEDURE — G0008 ADMIN INFLUENZA VIRUS VAC: HCPCS | Performed by: INTERNAL MEDICINE

## 2021-10-14 PROCEDURE — 4040F PNEUMOC VAC/ADMIN/RCVD: CPT | Performed by: INTERNAL MEDICINE

## 2021-10-14 PROCEDURE — 1123F ACP DISCUSS/DSCN MKR DOCD: CPT | Performed by: INTERNAL MEDICINE

## 2021-10-14 PROCEDURE — G8427 DOCREV CUR MEDS BY ELIG CLIN: HCPCS | Performed by: INTERNAL MEDICINE

## 2021-10-14 PROCEDURE — G8399 PT W/DXA RESULTS DOCUMENT: HCPCS | Performed by: INTERNAL MEDICINE

## 2021-10-14 PROCEDURE — G8484 FLU IMMUNIZE NO ADMIN: HCPCS | Performed by: INTERNAL MEDICINE

## 2021-10-14 PROCEDURE — 1036F TOBACCO NON-USER: CPT | Performed by: INTERNAL MEDICINE

## 2021-10-14 PROCEDURE — 1090F PRES/ABSN URINE INCON ASSESS: CPT | Performed by: INTERNAL MEDICINE

## 2021-10-14 PROCEDURE — 90694 VACC AIIV4 NO PRSRV 0.5ML IM: CPT | Performed by: INTERNAL MEDICINE

## 2021-10-14 PROCEDURE — 99214 OFFICE O/P EST MOD 30 MIN: CPT | Performed by: INTERNAL MEDICINE

## 2021-10-14 ASSESSMENT — ENCOUNTER SYMPTOMS
SHORTNESS OF BREATH: 0
BLOOD IN STOOL: 0

## 2021-10-14 NOTE — PROGRESS NOTES
Right lower leg: No edema. Left lower leg: No edema. Lymphadenopathy:      Head:      Right side of head: No submandibular adenopathy. Left side of head: No submandibular adenopathy. Cervical: No cervical adenopathy. Upper Body:      Right upper body: No supraclavicular or axillary adenopathy. Left upper body: No supraclavicular or axillary adenopathy. Lower Body: No right inguinal adenopathy. No left inguinal adenopathy. Skin:     Coloration: Skin is not jaundiced or pale. Neurological:      General: No focal deficit present. Mental Status: She is alert and oriented to person, place, and time. Cranial Nerves: No cranial nerve deficit. Psychiatric:         Mood and Affect: Mood is depressed. Mood is not anxious. Speech: Speech normal.         Behavior: Behavior is not agitated or withdrawn. Thought Content: Thought content normal. Thought content does not include homicidal or suicidal ideation. Cognition and Memory: Cognition normal.         Judgment: Judgment normal.         ASSESSMENT:       Encounter Diagnoses   Name Primary?  Need for influenza vaccination Yes    Weight loss     Primary hypertension     Hyperlipidemia, unspecified hyperlipidemia type     Bacteriuria, asymptomatic     Depression, reactive     Obstipation        Weight loss    she has lost another 10 pounds. She believes it is because she is not snacking any longer. She has stayed pretty much in her home during the whole pandemic and her  has been doing the shopping. Her labs are okay. CT chest abdomen and pelvis several months ago was also normal.  Discussed further testing and procedures at this time she declines. Dion was -4 months ago. I have asked her to follow-up in 3 months for reevaluation. Hypertension    BP okay-home blood pressure okay as well    Hyperlipidemia    lipids acceptable. Statin intolerance.     Bacteriuria, asymptomatic she did have some symptoms and was placed on antibiotics recently. Depression, reactive    she denies any current depression symptoms-but she is afraid to go out and do much to the ongoing pandemic. She primarily stays at home and is reliant upon her  for all shopping. Obstipation    no current issues. PLAN:See ASSESSMENT for evaluation & PLAN     Orders Placed This Encounter   Procedures    INFLUENZA, QUADV, ADJUVANTED, 72 YRS =, IM, PF, PREFILL SYR, 0.5ML (FLUAD)     , PMH, SH and FH reviewed and noted. Recent and past labs, tests and consultsalso reviewed. Recent or new meds also reviewed.

## 2021-10-14 NOTE — ASSESSMENT & PLAN NOTE
she has lost another 10 pounds. She believes it is because she is not snacking any longer. She has stayed pretty much in her home during the whole pandemic and her  has been doing the shopping. Her labs are okay. CT chest abdomen and pelvis several months ago was also normal.  Discussed further testing and procedures at this time she declines. Dion was -4 months ago. I have asked her to follow-up in 3 months for reevaluation.

## 2021-10-14 NOTE — ASSESSMENT & PLAN NOTE
she denies any current depression symptoms-but she is afraid to go out and do much to the ongoing pandemic. She primarily stays at home and is reliant upon her  for all shopping.

## 2022-01-03 ENCOUNTER — OFFICE VISIT (OUTPATIENT)
Dept: INTERNAL MEDICINE CLINIC | Age: 82
End: 2022-01-03
Payer: MEDICARE

## 2022-01-03 VITALS
HEIGHT: 66 IN | WEIGHT: 187 LBS | SYSTOLIC BLOOD PRESSURE: 130 MMHG | DIASTOLIC BLOOD PRESSURE: 70 MMHG | BODY MASS INDEX: 30.05 KG/M2

## 2022-01-03 DIAGNOSIS — E78.5 HYPERLIPIDEMIA, UNSPECIFIED HYPERLIPIDEMIA TYPE: ICD-10-CM

## 2022-01-03 DIAGNOSIS — R53.83 FATIGUE, UNSPECIFIED TYPE: ICD-10-CM

## 2022-01-03 DIAGNOSIS — R63.4 WEIGHT LOSS: ICD-10-CM

## 2022-01-03 DIAGNOSIS — I10 PRIMARY HYPERTENSION: Primary | ICD-10-CM

## 2022-01-03 PROCEDURE — 3288F FALL RISK ASSESSMENT DOCD: CPT | Performed by: INTERNAL MEDICINE

## 2022-01-03 PROCEDURE — G8399 PT W/DXA RESULTS DOCUMENT: HCPCS | Performed by: INTERNAL MEDICINE

## 2022-01-03 PROCEDURE — 1090F PRES/ABSN URINE INCON ASSESS: CPT | Performed by: INTERNAL MEDICINE

## 2022-01-03 PROCEDURE — G8427 DOCREV CUR MEDS BY ELIG CLIN: HCPCS | Performed by: INTERNAL MEDICINE

## 2022-01-03 PROCEDURE — 4040F PNEUMOC VAC/ADMIN/RCVD: CPT | Performed by: INTERNAL MEDICINE

## 2022-01-03 PROCEDURE — 99214 OFFICE O/P EST MOD 30 MIN: CPT | Performed by: INTERNAL MEDICINE

## 2022-01-03 PROCEDURE — 1123F ACP DISCUSS/DSCN MKR DOCD: CPT | Performed by: INTERNAL MEDICINE

## 2022-01-03 PROCEDURE — G8484 FLU IMMUNIZE NO ADMIN: HCPCS | Performed by: INTERNAL MEDICINE

## 2022-01-03 PROCEDURE — 1036F TOBACCO NON-USER: CPT | Performed by: INTERNAL MEDICINE

## 2022-01-03 PROCEDURE — G8417 CALC BMI ABV UP PARAM F/U: HCPCS | Performed by: INTERNAL MEDICINE

## 2022-01-03 RX ORDER — ACETAMINOPHEN 325 MG/1
650 TABLET ORAL EVERY 6 HOURS PRN
COMMUNITY

## 2022-01-03 RX ORDER — HYDRALAZINE HYDROCHLORIDE 50 MG/1
50 TABLET, FILM COATED ORAL 3 TIMES DAILY
COMMUNITY
End: 2022-09-12 | Stop reason: ALTCHOICE

## 2022-01-03 RX ORDER — CIPROFLOXACIN 500 MG/1
500 TABLET, FILM COATED ORAL
COMMUNITY
End: 2022-03-11 | Stop reason: SDUPTHER

## 2022-01-03 NOTE — PROGRESS NOTES
Rafael Connor (:  1940) is a 80 y.o. female,New patient, here for evaluation of the following chief complaint(s):  Establish Care         ASSESSMENT/PLAN:  1. Primary hypertension  -Controlled, continue carvedilol 25 mg twice daily, losartan 100 mg daily, nifedipine 30 mg daily  -     CBC Auto Differential; Future  -     Comprehensive Metabolic Panel; Future  -     Vitamin B12; Future  -     TSH with Reflex; Future  -     Folate; Future  -     Lipid Panel; Future  2. Weight loss  -Appears to stabilize, monitor  -     CBC Auto Differential; Future  -     Comprehensive Metabolic Panel; Future  -     Vitamin B12; Future  -     TSH with Reflex; Future  -     Folate; Future  3. Fatigue, unspecified type  -Check labs  -     CBC Auto Differential; Future  -     Comprehensive Metabolic Panel; Future  -     Vitamin B12; Future  -     TSH with Reflex; Future  -     Folate; Future  4. Hyperlipidemia, unspecified hyperlipidemia type  - not on medication  -     CBC Auto Differential; Future  -     Comprehensive Metabolic Panel; Future  -     Vitamin B12; Future  -     TSH with Reflex; Future  -     Folate; Future  -     Lipid Panel; Future      Return in about 6 months (around 7/3/2022) for HTN follow up, weight loss. Subjective   SUBJECTIVE/OBJECTIVE:  HPI    Here to establish care. She has been monitoring her weight, weight loss appears to stabilize. Appetite is good    Blood pressure has been controlled recently, she has been taking the carvedilol, losartan, nifedipine. She has not needed the hydralazine. She has been experiencing some fatigue. Review of Systems       Objective   Physical Exam  Vitals reviewed. Constitutional:       General: She is not in acute distress. Appearance: Normal appearance. She is well-developed. HENT:      Head: Normocephalic and atraumatic. Cardiovascular:      Rate and Rhythm: Normal rate and regular rhythm. Heart sounds: Normal heart sounds.    Pulmonary: Effort: Pulmonary effort is normal. No respiratory distress. Breath sounds: Normal breath sounds. Musculoskeletal:      Right lower leg: No edema. Left lower leg: No edema. Skin:     General: Skin is warm and dry. Neurological:      Mental Status: She is alert. Psychiatric:         Mood and Affect: Mood normal.         Behavior: Behavior normal.         Thought Content: Thought content normal.         Judgment: Judgment normal.                  An electronic signature was used to authenticate this note.     --Matt Iraheta MD

## 2022-01-07 RX ORDER — LOSARTAN POTASSIUM 100 MG/1
TABLET ORAL
Qty: 90 TABLET | Refills: 3 | Status: SHIPPED | OUTPATIENT
Start: 2022-01-07

## 2022-01-24 ENCOUNTER — OFFICE VISIT (OUTPATIENT)
Dept: ORTHOPEDIC SURGERY | Age: 82
End: 2022-01-24
Payer: MEDICARE

## 2022-01-24 DIAGNOSIS — M17.12 PRIMARY OSTEOARTHRITIS OF LEFT KNEE: Primary | ICD-10-CM

## 2022-01-24 PROCEDURE — 20610 DRAIN/INJ JOINT/BURSA W/O US: CPT | Performed by: PHYSICIAN ASSISTANT

## 2022-01-24 RX ORDER — TRIAMCINOLONE ACETONIDE 40 MG/ML
40 INJECTION, SUSPENSION INTRA-ARTICULAR; INTRAMUSCULAR ONCE
Status: COMPLETED | OUTPATIENT
Start: 2022-01-24 | End: 2022-01-24

## 2022-01-24 RX ORDER — BUPIVACAINE HYDROCHLORIDE 2.5 MG/ML
2 INJECTION, SOLUTION INFILTRATION; PERINEURAL ONCE
Status: COMPLETED | OUTPATIENT
Start: 2022-01-24 | End: 2022-01-24

## 2022-01-24 RX ADMIN — BUPIVACAINE HYDROCHLORIDE 5 MG: 2.5 INJECTION, SOLUTION INFILTRATION; PERINEURAL at 10:30

## 2022-01-24 RX ADMIN — TRIAMCINOLONE ACETONIDE 40 MG: 40 INJECTION, SUSPENSION INTRA-ARTICULAR; INTRAMUSCULAR at 10:31

## 2022-01-24 NOTE — PROGRESS NOTES
Subjective:      Patient ID: Ollie Hughes is a 80 y.o.  female. HPI: She is here for evaluation and treatment of left knee pain related to arthritis. She states the previous injection which was performed 9/23/2021  helped relieve the knee symptoms. She knee pain has gradually returned. She denies recent injury. Pain is 0-2/10. Pain is worse with weightbearing activities. Pain improves somewhat with rest and elevation. Review of Systems:   She denies fever or chills. She denies numbness or tingling around the knee. Past Medical History:   Diagnosis Date    Arthritis     Cataract     High blood pressure     Hyperlipidemia     stress test 2007    normal       Family History   Problem Relation Age of Onset    Cancer Other     High Blood Pressure Other        Past Surgical History:   Procedure Laterality Date    EYE SURGERY  1985    L eye - injury    JOINT REPLACEMENT      R TKR - Dr Kong Hicks Not on file   Tobacco Use    Smoking status: Never Smoker    Smokeless tobacco: Never Used   Substance and Sexual Activity    Alcohol use: No     Alcohol/week: 0.0 standard drinks    Drug use: No    Sexual activity: Not on file       Current Outpatient Medications   Medication Sig Dispense Refill    losartan (COZAAR) 100 MG tablet TAKE 1 TABLET BY MOUTH  DAILY 90 tablet 3    acetaminophen (TYLENOL) 325 MG tablet Take 650 mg by mouth every 6 hours as needed for Pain      hydrALAZINE (APRESOLINE) 50 MG tablet Take 50 mg by mouth 3 times daily Take 1 every 8 hours as needed for bp >180      ciprofloxacin (CIPRO) 500 MG tablet Take 500 mg by mouth Take 1 tablet by mouth twice a day as needed for UTI      carvedilol (COREG) 25 MG tablet Take 1 Tablet by mouth two times daily 180 tablet 3    NIFEdipine (PROCARDIA XL) 30 MG extended release tablet Take 1 tablet by mouth daily 90 tablet 3    aspirin 81 MG tablet Take 81 mg by mouth daily. Current Facility-Administered Medications   Medication Dose Route Frequency Provider Last Rate Last Admin    bupivacaine (MARCAINE) 0.25 % injection 5 mg  2 mL Intra-artICUlar Once Jerome Labrum, PA        triamcinolone acetonide (KENALOG-40) injection 40 mg  40 mg Intra-artICUlar Once Jerome Labrum, PA           Allergies   Allergen Reactions    Codeine Rash    Sulfa Antibiotics Anaphylaxis and Other (See Comments)    Atorvastatin      Reaction: Increases LFT's        Objective:   She is alert, oriented x 3, pleasant, well nourished, developed and in no acute distress. There were no vitals taken for this visit. Examination of the left knee shows: There is not erythema. ROM- decreased range of motion is noted. There is mild to moderate pain associated with ROM testing. Extensor Mechanism is intact. X Rays: was not performed in the office today:       Diagnosis       ICD-10-CM    1. Primary osteoarthritis of left knee  M17.12 57853 - [place] DRAIN/INJECT LARGE JOINT/BURSA     bupivacaine (MARCAINE) 0.25 % injection 5 mg     triamcinolone acetonide (KENALOG-40) injection 40 mg        Assessment/ Plan:     Assessment:  Advanced arthritis of left knee. History of right knee arthroplasty. She is not interested in left knee arthroplasty. Previous steroid injection 4 months ago provided significant pain relief. I had an extensive discussion with Ms. Vanessa Rivera  regarding the natural history, etiology, and long term consequences of her condition. I have presented reasonable alternatives to the patient's proposed care, treatment, and services. Risks and benefits of the treatment options also reviewed in detail. I have outlined a treatment plan with them. She has had full opportunity to ask her questions. I have answered them all to her satisfaction. I feel that Ms. Vanessa Rivera understands our discussion today.        Reviewed conservative treatment of knee symptoms/arthritis, according to AAOS Clinical Practice Guidelines:  1)  Recommend oral or topical NSAIDs to reduce pain and swelling. 2)  Recommend acetaminophen to reduce pain. 3)  Oral narcotics, including tramadol, result in a significant increase of adverse events and are not effective at improving pain or function for treatment of osteoarthritis of the knee. 4)  Recommend maintaining weight in a healthy range to reduce stresses on the knee, particularly the patellofemoral compartment which sees up to 6x body weight. 5)  Home exercise program, focusing on strengthening, low impact aerobic exercises, and neuromuscular education. 6)  Intra-articular corticosteroid injections are an option. Informed patient corticosteroid injections can be performed every 3-4 months as needed. 7)  Evidence for intra-articular hyaluronic acid injections (viscosupplementation) is not as strong, but may benefit a subset of patients who have failed other options. Informed patient viscosupplementation can be performed every 6 months as needed. 8)  Bracing and cane use can improve pain and function. Although not specifically listed in the CPGs, ice therapy and topical patches such as Salonpas are good options as well. Other non-surgical options including Coolief (cooled frequency ablation of the geniculate nerves), stem cell injections, PRP injections were discussed. Surgical option, knee arthroplasty discussed. Plan:  Medications-Tylenol recommended for pain. Procedures- left knee intra-articular corticosteroid injection. Risk and benefits of corticosteroid intra-articular injection was discussed today. All questions were answered to her satisfaction. She verbally consented to proceed with intra-articular injections today.              PROCEDURE NOTE:     Pre op Diagnosis:  left knee arthritis     Post op Diagnosis: Same  The right knee was prepped in standard sterile fashion with  Alcohol and 2 cc of 0.25% Marcaine and 1 cc of Kenalog 40 mg was injected into the right lateral compartment without difficulty. Procedure was tolerated well without difficulty and a band-aid was applied. Advised to ice the knee for 15-20 minutes to relieve any injection site related pain. Follow up- 3-4 months. Call or return to clinic if these symptoms worsen or fail to improve as anticipated. Sara Olivas PA-C   Senior Physician Assistant   [facility]                                         Disclaimer: This note was generated with use of a verbal recognition program (DRAGON) and an attempt was made to check for errors. It is possible that there are still dictated errors within this office note. If so, please bring any significant errors to my attention for an addendum. All efforts were made to ensure that this office note is accurate.

## 2022-03-11 ENCOUNTER — TELEPHONE (OUTPATIENT)
Dept: INTERNAL MEDICINE CLINIC | Age: 82
End: 2022-03-11

## 2022-03-11 RX ORDER — CIPROFLOXACIN 500 MG/1
500 TABLET, FILM COATED ORAL 2 TIMES DAILY PRN
Qty: 10 TABLET | Refills: 2 | Status: SHIPPED | OUTPATIENT
Start: 2022-03-11 | End: 2022-05-23 | Stop reason: ALTCHOICE

## 2022-03-11 NOTE — TELEPHONE ENCOUNTER
Pharmacy needs refills sent of medication to new pharmacy.   ciprofloxacin (CIPRO) 500 MG tablet       New pharmacy:  [facility] [address] [place] [address] Pick[address] [facility] [address] - f [phone]

## 2022-03-21 ENCOUNTER — OFFICE VISIT (OUTPATIENT)
Dept: INTERNAL MEDICINE CLINIC | Age: 82
End: 2022-03-21
Payer: MEDICARE

## 2022-03-21 VITALS
BODY MASS INDEX: 29.25 KG/M2 | TEMPERATURE: 98.2 F | WEIGHT: 182 LBS | HEIGHT: 66 IN | SYSTOLIC BLOOD PRESSURE: 178 MMHG | DIASTOLIC BLOOD PRESSURE: 80 MMHG

## 2022-03-21 DIAGNOSIS — F33.8 SEASONAL AFFECTIVE DISORDER (HCC): Primary | ICD-10-CM

## 2022-03-21 DIAGNOSIS — R63.4 UNINTENTIONAL WEIGHT LOSS: ICD-10-CM

## 2022-03-21 LAB
A/G RATIO: 1.7 (ref 1.1–2.2)
ALBUMIN SERPL-MCNC: 4.1 G/DL (ref 3.4–5)
ALP BLD-CCNC: 87 U/L (ref 40–129)
ALT SERPL-CCNC: 9 U/L (ref 10–40)
ANION GAP SERPL CALCULATED.3IONS-SCNC: 16 MMOL/L (ref 3–16)
AST SERPL-CCNC: 11 U/L (ref 15–37)
BASOPHILS ABSOLUTE: 0 K/UL (ref 0–0.2)
BASOPHILS RELATIVE PERCENT: 0.7 %
BILIRUB SERPL-MCNC: 1.6 MG/DL (ref 0–1)
BUN BLDV-MCNC: 12 MG/DL (ref 7–20)
CALCIUM SERPL-MCNC: 9.2 MG/DL (ref 8.3–10.6)
CHLORIDE BLD-SCNC: 102 MMOL/L (ref 99–110)
CO2: 22 MMOL/L (ref 21–32)
CREAT SERPL-MCNC: 0.7 MG/DL (ref 0.6–1.2)
EOSINOPHILS ABSOLUTE: 0.1 K/UL (ref 0–0.6)
EOSINOPHILS RELATIVE PERCENT: 1.9 %
GFR AFRICAN AMERICAN: >60
GFR NON-AFRICAN AMERICAN: >60
GLUCOSE BLD-MCNC: 106 MG/DL (ref 70–99)
HCT VFR BLD CALC: 41.4 % (ref 36–48)
HEMOGLOBIN: 14.2 G/DL (ref 12–16)
LYMPHOCYTES ABSOLUTE: 1.2 K/UL (ref 1–5.1)
LYMPHOCYTES RELATIVE PERCENT: 16.7 %
MCH RBC QN AUTO: 31.2 PG (ref 26–34)
MCHC RBC AUTO-ENTMCNC: 34.3 G/DL (ref 31–36)
MCV RBC AUTO: 91.1 FL (ref 80–100)
MONOCYTES ABSOLUTE: 0.5 K/UL (ref 0–1.3)
MONOCYTES RELATIVE PERCENT: 6.5 %
NEUTROPHILS ABSOLUTE: 5.2 K/UL (ref 1.7–7.7)
NEUTROPHILS RELATIVE PERCENT: 74.2 %
PDW BLD-RTO: 13.6 % (ref 12.4–15.4)
PLATELET # BLD: 229 K/UL (ref 135–450)
PMV BLD AUTO: 8.7 FL (ref 5–10.5)
POTASSIUM SERPL-SCNC: 4.2 MMOL/L (ref 3.5–5.1)
RBC # BLD: 4.54 M/UL (ref 4–5.2)
SODIUM BLD-SCNC: 140 MMOL/L (ref 136–145)
TOTAL PROTEIN: 6.5 G/DL (ref 6.4–8.2)
TSH REFLEX: 3.69 UIU/ML (ref 0.27–4.2)
WBC # BLD: 7 K/UL (ref 4–11)

## 2022-03-21 PROCEDURE — 1123F ACP DISCUSS/DSCN MKR DOCD: CPT | Performed by: INTERNAL MEDICINE

## 2022-03-21 PROCEDURE — G8417 CALC BMI ABV UP PARAM F/U: HCPCS | Performed by: INTERNAL MEDICINE

## 2022-03-21 PROCEDURE — G8399 PT W/DXA RESULTS DOCUMENT: HCPCS | Performed by: INTERNAL MEDICINE

## 2022-03-21 PROCEDURE — G8484 FLU IMMUNIZE NO ADMIN: HCPCS | Performed by: INTERNAL MEDICINE

## 2022-03-21 PROCEDURE — G8427 DOCREV CUR MEDS BY ELIG CLIN: HCPCS | Performed by: INTERNAL MEDICINE

## 2022-03-21 PROCEDURE — 1036F TOBACCO NON-USER: CPT | Performed by: INTERNAL MEDICINE

## 2022-03-21 PROCEDURE — 99214 OFFICE O/P EST MOD 30 MIN: CPT | Performed by: INTERNAL MEDICINE

## 2022-03-21 PROCEDURE — 1090F PRES/ABSN URINE INCON ASSESS: CPT | Performed by: INTERNAL MEDICINE

## 2022-03-21 PROCEDURE — 4040F PNEUMOC VAC/ADMIN/RCVD: CPT | Performed by: INTERNAL MEDICINE

## 2022-03-21 RX ORDER — SERTRALINE HYDROCHLORIDE 25 MG/1
25 TABLET, FILM COATED ORAL DAILY
Qty: 30 TABLET | Refills: 1 | Status: SHIPPED | OUTPATIENT
Start: 2022-03-21 | End: 2022-05-10

## 2022-03-21 NOTE — PROGRESS NOTES
Halle Roblero (:  1940) is a 80 y.o. female,Established patient, here for evaluation of the following chief complaint(s):  Weight Loss (recurring weight loss /depression,somedizzy speels thinks it's from glasses)         ASSESSMENT/PLAN:  1. Seasonal affective disorder ([address]   -Could not tolerate Lexapro, that is the only medication she has tried. Would start with sertraline 25 mg once daily, there is plenty of room to increase. Unclear whether the tremor is related to mood, it was intermittent and at rest, could be parkinsonian although I did not note any other parkinsonian features. 2. Unintentional weight loss  -Had seemed to stabilize at last visit but has lost more weight since then. Could be related to mood, certainly depression or anxiety can cause weight loss. -     Comprehensive Metabolic Panel; Future  -     TSH with Reflex; Future  -     CBC with Auto Differential; Future      Return in about 2 months (around 2022) for mood. Subjective   SUBJECTIVE/OBJECTIVE:  HPI    Follow-up for mood and weight loss. She has decreased energy, no get up and go. Sleep is good, can sleep a lot. No significant change in appetite or early satiety. She started noticing some shaking which occurs at rest.  This is intermittent. Dizziness started after she had to stop wearing contacts due to dry eyes, she does not do well with glasses. Review of Systems       Objective   Physical Exam  Vitals reviewed. Constitutional:       General: She is not in acute distress. Appearance: Normal appearance. She is well-developed. HENT:      Head: Normocephalic and atraumatic. Cardiovascular:      Rate and Rhythm: Normal rate and regular rhythm. Heart sounds: Normal heart sounds. Pulmonary:      Effort: Pulmonary effort is normal. No respiratory distress. Breath sounds: Normal breath sounds. Abdominal:      General: Abdomen is flat. Bowel sounds are normal. There is no distension. Palpations: Abdomen is soft. There is no mass. Tenderness: There is no abdominal tenderness. Hernia: No hernia is present. Skin:     General: Skin is warm and dry. Neurological:      Mental Status: She is alert and oriented to person, place, and time. Comments: Occasional pill-rolling tremor at rest   Psychiatric:         Mood and Affect: Affect normal. Mood is anxious. Behavior: Behavior normal.         Thought Content: Thought content normal.         Judgment: Judgment normal.                  An electronic signature was used to authenticate this note.     --Aron Fallon MD

## 2022-03-26 RX ORDER — CARVEDILOL 25 MG/1
TABLET ORAL
Qty: 180 TABLET | Refills: 3 | Status: SHIPPED | OUTPATIENT
Start: 2022-03-26

## 2022-05-06 RX ORDER — NIFEDIPINE 30 MG/1
TABLET, FILM COATED, EXTENDED RELEASE ORAL
Qty: 90 TABLET | Refills: 3 | Status: SHIPPED | OUTPATIENT
Start: 2022-05-06 | End: 2022-05-23 | Stop reason: SDUPTHER

## 2022-05-10 RX ORDER — SERTRALINE HYDROCHLORIDE 25 MG/1
TABLET, FILM COATED ORAL
Qty: 30 TABLET | Refills: 1 | Status: SHIPPED | OUTPATIENT
Start: 2022-05-10 | End: 2022-05-23 | Stop reason: SDUPTHER

## 2022-05-23 ENCOUNTER — OFFICE VISIT (OUTPATIENT)
Dept: INTERNAL MEDICINE CLINIC | Age: 82
End: 2022-05-23
Payer: MEDICARE

## 2022-05-23 VITALS
SYSTOLIC BLOOD PRESSURE: 152 MMHG | WEIGHT: 182 LBS | HEIGHT: 66 IN | BODY MASS INDEX: 29.25 KG/M2 | DIASTOLIC BLOOD PRESSURE: 68 MMHG

## 2022-05-23 DIAGNOSIS — F32.A ANXIETY AND DEPRESSION: ICD-10-CM

## 2022-05-23 DIAGNOSIS — I10 PRIMARY HYPERTENSION: ICD-10-CM

## 2022-05-23 DIAGNOSIS — F33.8 SEASONAL AFFECTIVE DISORDER (HCC): ICD-10-CM

## 2022-05-23 DIAGNOSIS — R25.1 TREMOR OF RIGHT HAND: ICD-10-CM

## 2022-05-23 DIAGNOSIS — F41.9 ANXIETY AND DEPRESSION: ICD-10-CM

## 2022-05-23 DIAGNOSIS — Z00.00 MEDICARE ANNUAL WELLNESS VISIT, SUBSEQUENT: Primary | ICD-10-CM

## 2022-05-23 PROCEDURE — 1123F ACP DISCUSS/DSCN MKR DOCD: CPT | Performed by: INTERNAL MEDICINE

## 2022-05-23 PROCEDURE — G0439 PPPS, SUBSEQ VISIT: HCPCS | Performed by: INTERNAL MEDICINE

## 2022-05-23 SDOH — ECONOMIC STABILITY: FOOD INSECURITY: WITHIN THE PAST 12 MONTHS, THE FOOD YOU BOUGHT JUST DIDN'T LAST AND YOU DIDN'T HAVE MONEY TO GET MORE.: NEVER TRUE

## 2022-05-23 SDOH — ECONOMIC STABILITY: FOOD INSECURITY: WITHIN THE PAST 12 MONTHS, YOU WORRIED THAT YOUR FOOD WOULD RUN OUT BEFORE YOU GOT MONEY TO BUY MORE.: NEVER TRUE

## 2022-05-23 ASSESSMENT — PATIENT HEALTH QUESTIONNAIRE - PHQ9
8. MOVING OR SPEAKING SO SLOWLY THAT OTHER PEOPLE COULD HAVE NOTICED. OR THE OPPOSITE, BEING SO FIGETY OR RESTLESS THAT YOU HAVE BEEN MOVING AROUND A LOT MORE THAN USUAL: 0
6. FEELING BAD ABOUT YOURSELF - OR THAT YOU ARE A FAILURE OR HAVE LET YOURSELF OR YOUR FAMILY DOWN: 0
SUM OF ALL RESPONSES TO PHQ QUESTIONS 1-9: 18
4. FEELING TIRED OR HAVING LITTLE ENERGY: 3
SUM OF ALL RESPONSES TO PHQ QUESTIONS 1-9: 18
SUM OF ALL RESPONSES TO PHQ9 QUESTIONS 1 & 2: 6
7. TROUBLE CONCENTRATING ON THINGS, SUCH AS READING THE NEWSPAPER OR WATCHING TELEVISION: 3
3. TROUBLE FALLING OR STAYING ASLEEP: 3
SUM OF ALL RESPONSES TO PHQ QUESTIONS 1-9: 18
9. THOUGHTS THAT YOU WOULD BE BETTER OFF DEAD, OR OF HURTING YOURSELF: 0
10. IF YOU CHECKED OFF ANY PROBLEMS, HOW DIFFICULT HAVE THESE PROBLEMS MADE IT FOR YOU TO DO YOUR WORK, TAKE CARE OF THINGS AT HOME, OR GET ALONG WITH OTHER PEOPLE: 3
2. FEELING DOWN, DEPRESSED OR HOPELESS: 3
1. LITTLE INTEREST OR PLEASURE IN DOING THINGS: 3
5. POOR APPETITE OR OVEREATING: 3
SUM OF ALL RESPONSES TO PHQ QUESTIONS 1-9: 18

## 2022-05-23 ASSESSMENT — LIFESTYLE VARIABLES: HOW OFTEN DO YOU HAVE A DRINK CONTAINING ALCOHOL: NEVER

## 2022-05-23 ASSESSMENT — SOCIAL DETERMINANTS OF HEALTH (SDOH): HOW HARD IS IT FOR YOU TO PAY FOR THE VERY BASICS LIKE FOOD, HOUSING, MEDICAL CARE, AND HEATING?: NOT HARD AT ALL

## 2022-05-23 NOTE — PROGRESS NOTES
Medicare Annual Wellness Visit    Dione Blanco is here for Medicare AWV    Assessment & Plan   Medicare annual wellness visit, subsequent  Anxiety and depression   - no effect yet with sertraline 25mg daily, increase to sertraline 50mg daily. Could temporarily add buspirone if needed  Tremor of right hand   - possibly Parkinson's. Tremor is mild. Does not have a lot of other features at this point. Seasonal affective disorder ([facility] [address]   - no improvement in mood with change in season, adjusting sertraline as above  Primary hypertension    - elevated to day but had issues with lightheadedness recently - monitor and will readjust medications if remaining elevated   Recommendations for Preventive Services Due: see orders and patient instructions/AVS.  Recommended screening schedule for the next 5-10 years is provided to the patient in written form: see Patient Instructions/AVS.     No follow-ups on file. Subjective   The following acute and/or chronic problems were also addressed today:    Shaking is more in the right hand. More at rest than with movement. Notes more overnight in the middle of the night    Lightheadedness was due to the blood pressure medication but she figured out how to fix it - adjusted when she was taking one of the medications. Anxiety not any better - still significant. Thought depression may improve with change in season but not feeling any better. Patient's complete Health Risk Assessment and screening values have been reviewed and are found in Flowsheets. The following problems were reviewed today and where indicated follow up appointments were made and/or referrals ordered.     Positive Risk Factor Screenings with Interventions:    Fall Risk:  Do you feel unsteady or are you worried about falling? : (!) yes  2 or more falls in past year?: no  Fall with injury in past year?: no     Fall Risk Interventions:    · Home safety tips provided     Depression:  PHQ-2 Score: 6  PHQ-9 Total 1440 05/23/22 1446 05/23/22 1509   BP: (!) 168/84 (!) 160/80 (!) 152/68   Site: Left Upper Arm Right Upper Arm Left Upper Arm   Weight: 182 lb (82.6 kg)     Height: 5' 6\" (1.676 m)        Body mass index is 29.38 kg/m². Physical Exam  Vitals reviewed. Constitutional:       General: She is not in acute distress. Appearance: Normal appearance. She is well-developed. HENT:      Head: Normocephalic and atraumatic. Cardiovascular:      Rate and Rhythm: Normal rate and regular rhythm. Heart sounds: Normal heart sounds. Pulmonary:      Effort: Pulmonary effort is normal. No respiratory distress. Breath sounds: Normal breath sounds. Skin:     General: Skin is warm and dry. Neurological:      Mental Status: She is alert. Comments: Intermittent slight resting tremor in the right hand   Psychiatric:         Mood and Affect: Mood is anxious. Behavior: Behavior normal.         Thought Content: Thought content normal.         Judgment: Judgment normal.               Allergies   Allergen Reactions    Codeine Rash    Sulfa Antibiotics Anaphylaxis and Other (See Comments)    Atorvastatin      Reaction: Increases LFT's     Prior to Visit Medications    Medication Sig Taking? Authorizing Provider   sertraline (ZOLOFT) 50 MG tablet TAKE 1 TABLET BY MOUTH EVERY DAY Yes Reed Cedeno MD   carvedilol (COREG) 25 MG tablet TAKE 1 TABLET BY MOUTH  TWICE DAILY Yes Reed Cedeno MD   losartan (COZAAR) 100 MG tablet TAKE 1 TABLET BY MOUTH  DAILY Yes Reed Cedeno MD   acetaminophen (TYLENOL) 325 MG tablet Take 650 mg by mouth every 6 hours as needed for Pain Yes Historical Provider, MD   hydrALAZINE (APRESOLINE) 50 MG tablet Take 50 mg by mouth 3 times daily Take 1 every 8 hours as needed for bp >180 Yes Historical Provider, MD   NIFEdipine (PROCARDIA XL) 30 MG extended release tablet Take 1 tablet by mouth daily Yes Otoniel Vences MD   aspirin 81 MG tablet Take 81 mg by mouth daily.  Yes Historical Provider, MD       CareTeam (Including outside providers/suppliers regularly involved in providing care):   Patient Care Team:  Marky Quiroga MD as PCP - General (Internal Medicine)  Marky Quiroga MD as PCP - [facility] Empaneled Provider  Sushil Colin MD as Surgeon (General Surgery)     Reviewed and updated this visit:  Tobacco  Allergies  Meds  Med Hx  Surg Hx  Soc Hx  Fam Hx

## 2022-05-23 NOTE — PATIENT INSTRUCTIONS
Personalized Preventive Plan for Francesco Guevara - 5/23/2022  Medicare offers a range of preventive health benefits. Some of the tests and screenings are paid in full while other may be subject to a deductible, co-insurance, and/or copay. Some of these benefits include a comprehensive review of your medical history including lifestyle, illnesses that may run in your family, and various assessments and screenings as appropriate. After reviewing your medical record and screening and assessments performed today your provider may have ordered immunizations, labs, imaging, and/or referrals for you. A list of these orders (if applicable) as well as your Preventive Care list are included within your After Visit Summary for your review. Other Preventive Recommendations:    · A preventive eye exam performed by an eye specialist is recommended every 1-2 years to screen for glaucoma; cataracts, macular degeneration, and other eye disorders. · A preventive dental visit is recommended every 6 months. · Try to get at least 150 minutes of exercise per week or 10,000 steps per day on a pedometer . · Order or download the FREE \"Exercise & Physical Activity: Your Everyday Guide\" from [facility] [employer]. Call 3-535.191.3120 or search [facility] [employer] online. · You need 9672-1942 mg of calcium and 9083-7465 IU of vitamin D per day. It is possible to meet your calcium requirement with diet alone, but a vitamin D supplement is usually necessary to meet this goal.  · When exposed to the sun, use a sunscreen that protects against both UVA and UVB radiation with an SPF of 30 or greater. Reapply every 2 to 3 hours or after sweating, drying off with a towel, or swimming. · Always wear a seat belt when traveling in a car. Always wear a helmet when riding a bicycle or motorcycle.     If you find in a couple of weeks that you still are not noticing any difference - let me know and we can try adding buspirone in the short term.

## 2022-05-24 ENCOUNTER — OFFICE VISIT (OUTPATIENT)
Dept: ORTHOPEDIC SURGERY | Age: 82
End: 2022-05-24
Payer: MEDICARE

## 2022-05-24 VITALS — BODY MASS INDEX: 29.25 KG/M2 | WEIGHT: 182 LBS | RESPIRATION RATE: 16 BRPM | HEIGHT: 66 IN

## 2022-05-24 DIAGNOSIS — M17.12 PRIMARY OSTEOARTHRITIS OF LEFT KNEE: Primary | ICD-10-CM

## 2022-05-24 PROCEDURE — 99999 PR OFFICE/OUTPT VISIT,PROCEDURE ONLY: CPT | Performed by: PHYSICIAN ASSISTANT

## 2022-05-24 PROCEDURE — 20610 DRAIN/INJ JOINT/BURSA W/O US: CPT | Performed by: PHYSICIAN ASSISTANT

## 2022-05-24 RX ORDER — BUPIVACAINE HYDROCHLORIDE 2.5 MG/ML
2 INJECTION, SOLUTION INFILTRATION; PERINEURAL ONCE
Status: COMPLETED | OUTPATIENT
Start: 2022-05-24 | End: 2022-05-24

## 2022-05-24 RX ORDER — TRIAMCINOLONE ACETONIDE 40 MG/ML
40 INJECTION, SUSPENSION INTRA-ARTICULAR; INTRAMUSCULAR ONCE
Status: COMPLETED | OUTPATIENT
Start: 2022-05-24 | End: 2022-05-24

## 2022-05-24 RX ADMIN — TRIAMCINOLONE ACETONIDE 40 MG: 40 INJECTION, SUSPENSION INTRA-ARTICULAR; INTRAMUSCULAR at 10:35

## 2022-05-24 RX ADMIN — BUPIVACAINE HYDROCHLORIDE 5 MG: 2.5 INJECTION, SOLUTION INFILTRATION; PERINEURAL at 10:34

## 2022-05-24 NOTE — PROGRESS NOTES
.  Subjective:      Patient ID: Jovanna Bond is a [de-identified] y.o.  female. HPI: She is here for evaluation and treatment of left knee pain related to arthritis. She states the previous injection which was performed 1/24/2022  helped relieve the knee symptoms. She knee pain has gradually returned. She denies recent injury. Pain is 7/10. Pain is worse with weightbearing activities. Pain improves somewhat with rest and elevation. Review of Systems:   She denies fever or chills. She denies numbness or tingling around the knee. Past Medical History:   Diagnosis Date    Arthritis     Cataract     High blood pressure     Hyperlipidemia     stress test 2007    normal       Family History   Problem Relation Age of Onset    Cancer Other     High Blood Pressure Other        Past Surgical History:   Procedure Laterality Date    EYE SURGERY  1985    L eye - injury    JOINT REPLACEMENT      R TKR - Dr Nery Cedeno Not on file   Tobacco Use    Smoking status: Never Smoker    Smokeless tobacco: Never Used   Substance and Sexual Activity    Alcohol use: No     Alcohol/week: 0.0 standard drinks    Drug use: No    Sexual activity: Not on file       Current Outpatient Medications   Medication Sig Dispense Refill    sertraline (ZOLOFT) 50 MG tablet TAKE 1 TABLET BY MOUTH EVERY DAY 30 tablet 1    carvedilol (COREG) 25 MG tablet TAKE 1 TABLET BY MOUTH  TWICE DAILY 180 tablet 3    losartan (COZAAR) 100 MG tablet TAKE 1 TABLET BY MOUTH  DAILY 90 tablet 3    acetaminophen (TYLENOL) 325 MG tablet Take 650 mg by mouth every 6 hours as needed for Pain      hydrALAZINE (APRESOLINE) 50 MG tablet Take 50 mg by mouth 3 times daily Take 1 every 8 hours as needed for bp >180      NIFEdipine (PROCARDIA XL) 30 MG extended release tablet Take 1 tablet by mouth daily 90 tablet 3    aspirin 81 MG tablet Take 81 mg by mouth daily.        No current facility-administered medications for this visit. Allergies   Allergen Reactions    Codeine Rash    Sulfa Antibiotics Anaphylaxis and Other (See Comments)    Atorvastatin      Reaction: Increases LFT's        Objective:   She is alert, oriented x 3, pleasant, well nourished, developed and in no acute distress. Resp 16   Ht 5' 6\" (1.676 m)   Wt 182 lb (82.6 kg)   BMI 29.38 kg/m²      Examination of the left knee shows: There is not erythema. ROM- decreased range of motion is noted. There is mild to moderate pain associated with ROM testing. Extensor Mechanism is intact. X Rays: was not performed in the office today:       Diagnosis       ICD-10-CM    1. Primary osteoarthritis of left knee  M17.12 XR KNEE LEFT (3 VIEWS)     bupivacaine (MARCAINE) 0.25 % injection 5 mg     triamcinolone acetonide (KENALOG-40) injection 40 mg     23286 - [place] DRAIN/INJECT LARGE JOINT/BURSA        Assessment/ Plan:     Assessment:  Degenerative arthritis of the left knee. History of prior right knee arthroplasty. Not interested in left knee arthroplasty. Steroid injections do provide moderate temporary relief of left knee pain. Plan:  Medications-Tylenol as needed. PT- A home exercise program was instructed today including ROM exercises and strengthening exercises. The patient verbalized understanding of these exercises as well as the importance of the exercise program to promote return of normal function. If pain intensifies or other problems arise you are to notify the office. Procedures- left knee intra-articular corticosteroid injection. Risk and benefits of corticosteroid intra-articular injection was discussed today. All questions were answered to her satisfaction. She verbally consented to proceed with intra-articular injections today.              PROCEDURE NOTE:     Pre op Diagnosis:  left knee arthritis     Post op Diagnosis: Same  The right knee was prepped in standard sterile fashion with  Alcohol and 2 cc of 0.25% Marcaine and 1 cc of Kenalog 40 mg was injected into the right lateral compartment without difficulty. Procedure was tolerated well without difficulty and a band-aid was applied. Advised to ice the knee for 15-20 minutes to relieve any injection site related pain. Follow up- 3-4 months. Call or return to clinic if these symptoms worsen or fail to improve as anticipated. Anaya Sweeney PA-C   Senior Physician Assistant   [facility]                                         Disclaimer: This note was generated with use of a verbal recognition program (DRAGON) and an attempt was made to check for errors. It is possible that there are still dictated errors within this office note. If so, please bring any significant errors to my attention for an addendum. All efforts were made to ensure that this office note is accurate.

## 2022-05-25 PROBLEM — F32.9 DEPRESSION, REACTIVE: Status: RESOLVED | Noted: 2021-03-08 | Resolved: 2022-05-25

## 2022-05-25 PROBLEM — F41.9 ANXIETY AND DEPRESSION: Status: ACTIVE | Noted: 2022-05-25

## 2022-05-25 PROBLEM — R25.1 TREMOR OF RIGHT HAND: Status: ACTIVE | Noted: 2022-05-25

## 2022-05-25 PROBLEM — F32.A ANXIETY AND DEPRESSION: Status: ACTIVE | Noted: 2022-05-25

## 2022-06-29 DIAGNOSIS — E78.5 HYPERLIPIDEMIA, UNSPECIFIED HYPERLIPIDEMIA TYPE: ICD-10-CM

## 2022-06-29 DIAGNOSIS — I10 PRIMARY HYPERTENSION: ICD-10-CM

## 2022-06-29 DIAGNOSIS — R53.83 FATIGUE, UNSPECIFIED TYPE: ICD-10-CM

## 2022-06-29 DIAGNOSIS — R63.4 WEIGHT LOSS: ICD-10-CM

## 2022-06-29 LAB
A/G RATIO: 2 (ref 1.1–2.2)
ALBUMIN SERPL-MCNC: 4.3 G/DL (ref 3.4–5)
ALP BLD-CCNC: 78 U/L (ref 40–129)
ALT SERPL-CCNC: 9 U/L (ref 10–40)
ANION GAP SERPL CALCULATED.3IONS-SCNC: 12 MMOL/L (ref 3–16)
AST SERPL-CCNC: 10 U/L (ref 15–37)
BASOPHILS ABSOLUTE: 0 K/UL (ref 0–0.2)
BASOPHILS RELATIVE PERCENT: 0.8 %
BILIRUB SERPL-MCNC: 1.3 MG/DL (ref 0–1)
BUN BLDV-MCNC: 11 MG/DL (ref 7–20)
CALCIUM SERPL-MCNC: 9.5 MG/DL (ref 8.3–10.6)
CHLORIDE BLD-SCNC: 99 MMOL/L (ref 99–110)
CHOLESTEROL, TOTAL: 254 MG/DL (ref 0–199)
CO2: 27 MMOL/L (ref 21–32)
CREAT SERPL-MCNC: 0.6 MG/DL (ref 0.6–1.2)
EOSINOPHILS ABSOLUTE: 0.1 K/UL (ref 0–0.6)
EOSINOPHILS RELATIVE PERCENT: 3.4 %
FOLATE: 6.21 NG/ML (ref 4.78–24.2)
GFR AFRICAN AMERICAN: >60
GFR NON-AFRICAN AMERICAN: >60
GLUCOSE BLD-MCNC: 108 MG/DL (ref 70–99)
HCT VFR BLD CALC: 41.4 % (ref 36–48)
HDLC SERPL-MCNC: 62 MG/DL (ref 40–60)
HEMOGLOBIN: 14.2 G/DL (ref 12–16)
LDL CHOLESTEROL CALCULATED: 174 MG/DL
LYMPHOCYTES ABSOLUTE: 1 K/UL (ref 1–5.1)
LYMPHOCYTES RELATIVE PERCENT: 24 %
MCH RBC QN AUTO: 31.4 PG (ref 26–34)
MCHC RBC AUTO-ENTMCNC: 34.3 G/DL (ref 31–36)
MCV RBC AUTO: 91.5 FL (ref 80–100)
MONOCYTES ABSOLUTE: 0.2 K/UL (ref 0–1.3)
MONOCYTES RELATIVE PERCENT: 5.7 %
NEUTROPHILS ABSOLUTE: 2.8 K/UL (ref 1.7–7.7)
NEUTROPHILS RELATIVE PERCENT: 66.1 %
PDW BLD-RTO: 14.1 % (ref 12.4–15.4)
PLATELET # BLD: 245 K/UL (ref 135–450)
PMV BLD AUTO: 8.1 FL (ref 5–10.5)
POTASSIUM SERPL-SCNC: 4.2 MMOL/L (ref 3.5–5.1)
RBC # BLD: 4.52 M/UL (ref 4–5.2)
SODIUM BLD-SCNC: 138 MMOL/L (ref 136–145)
TOTAL PROTEIN: 6.5 G/DL (ref 6.4–8.2)
TRIGL SERPL-MCNC: 91 MG/DL (ref 0–150)
TSH REFLEX: 3.92 UIU/ML (ref 0.27–4.2)
VITAMIN B-12: 323 PG/ML (ref 211–911)
VLDLC SERPL CALC-MCNC: 18 MG/DL
WBC # BLD: 4.2 K/UL (ref 4–11)

## 2022-07-05 ENCOUNTER — OFFICE VISIT (OUTPATIENT)
Dept: INTERNAL MEDICINE CLINIC | Age: 82
End: 2022-07-05
Payer: MEDICARE

## 2022-07-05 VITALS
HEIGHT: 66 IN | OXYGEN SATURATION: 98 % | HEART RATE: 79 BPM | TEMPERATURE: 97.2 F | SYSTOLIC BLOOD PRESSURE: 146 MMHG | DIASTOLIC BLOOD PRESSURE: 76 MMHG | WEIGHT: 169.8 LBS | BODY MASS INDEX: 27.29 KG/M2

## 2022-07-05 DIAGNOSIS — R25.1 TREMOR OF RIGHT HAND: ICD-10-CM

## 2022-07-05 DIAGNOSIS — E78.5 HYPERLIPIDEMIA, UNSPECIFIED HYPERLIPIDEMIA TYPE: ICD-10-CM

## 2022-07-05 DIAGNOSIS — F41.9 ANXIETY AND DEPRESSION: Primary | ICD-10-CM

## 2022-07-05 DIAGNOSIS — R63.4 WEIGHT LOSS: ICD-10-CM

## 2022-07-05 DIAGNOSIS — F32.A ANXIETY AND DEPRESSION: Primary | ICD-10-CM

## 2022-07-05 PROCEDURE — 1036F TOBACCO NON-USER: CPT | Performed by: INTERNAL MEDICINE

## 2022-07-05 PROCEDURE — 1123F ACP DISCUSS/DSCN MKR DOCD: CPT | Performed by: INTERNAL MEDICINE

## 2022-07-05 PROCEDURE — G8417 CALC BMI ABV UP PARAM F/U: HCPCS | Performed by: INTERNAL MEDICINE

## 2022-07-05 PROCEDURE — G8427 DOCREV CUR MEDS BY ELIG CLIN: HCPCS | Performed by: INTERNAL MEDICINE

## 2022-07-05 PROCEDURE — G8399 PT W/DXA RESULTS DOCUMENT: HCPCS | Performed by: INTERNAL MEDICINE

## 2022-07-05 PROCEDURE — 1090F PRES/ABSN URINE INCON ASSESS: CPT | Performed by: INTERNAL MEDICINE

## 2022-07-05 PROCEDURE — 99214 OFFICE O/P EST MOD 30 MIN: CPT | Performed by: INTERNAL MEDICINE

## 2022-07-05 RX ORDER — SERTRALINE HYDROCHLORIDE 100 MG/1
TABLET, FILM COATED ORAL
Qty: 30 TABLET | Refills: 1 | Status: SHIPPED | OUTPATIENT
Start: 2022-07-05 | End: 2022-07-28

## 2022-07-05 NOTE — PROGRESS NOTES
Cassandra Green (:  1940) is a [de-identified] y.o. female,Established patient, here for evaluation of the following chief complaint(s):  Depression, Anxiety, and Shaking         ASSESSMENT/PLAN:  1. Anxiety and depression   -Increase sertraline to 100 mg daily. Discussed potentially adding BuSpar but we will see how she does with this. Follow-up in 6 weeks. 2. Tremor of right hand   -Resting tremor, intermittent. Would see if this improves when anxiety improves, if not then would have her see neurology for evaluation  3. Weight loss   -I am concerned about the degree of weight loss she has had, this is similar to what happened last summer. At that time she had a CT scan which was normal.  It may be all anxiety related, I will reassess at her next visit and if she continues to lose weight then needs further work-up  4. Hyperlipidemia, unspecified hyperlipidemia type  -     Comprehensive Metabolic Panel; Future  -     Lipid Panel; Future  -     CBC with Auto Differential; Future      Return in about 6 weeks (around 2022) for anxiety, weight. Subjective   SUBJECTIVE/OBJECTIVE:  HPI    Studies remain high. She has not noticed any difference with the sertraline but also no side effects. She will have anxiety for no apparent reason. She also notes the tremor on the right side. She feels like it is the right arm and the right leg. She continues to worry about this. She is taking the blood pressure medication regularly. Appetite has been good. She had not noticed that she lost weight. She thinks that weight loss is related to anxiety. Review of Systems       Objective   Physical Exam  Vitals reviewed. Constitutional:       General: She is not in acute distress. Appearance: Normal appearance. She is well-developed. HENT:      Head: Normocephalic and atraumatic. Cardiovascular:      Rate and Rhythm: Normal rate and regular rhythm. Heart sounds: Normal heart sounds.    Pulmonary: Effort: Pulmonary effort is normal. No respiratory distress. Breath sounds: Normal breath sounds. Skin:     General: Skin is warm and dry. Neurological:      Mental Status: She is alert. Psychiatric:         Mood and Affect: Mood is anxious. Behavior: Behavior normal.         Thought Content: Thought content normal.         Judgment: Judgment normal.                  An electronic signature was used to authenticate this note.     --Beatrix Gosselin, MD

## 2022-07-28 RX ORDER — SERTRALINE HYDROCHLORIDE 100 MG/1
TABLET, FILM COATED ORAL
Qty: 30 TABLET | Refills: 1 | Status: SHIPPED | OUTPATIENT
Start: 2022-07-28 | End: 2022-08-24

## 2022-08-19 ENCOUNTER — OFFICE VISIT (OUTPATIENT)
Dept: INTERNAL MEDICINE CLINIC | Age: 82
End: 2022-08-19
Payer: MEDICARE

## 2022-08-19 VITALS
BODY MASS INDEX: 26.84 KG/M2 | WEIGHT: 167 LBS | HEIGHT: 66 IN | SYSTOLIC BLOOD PRESSURE: 128 MMHG | DIASTOLIC BLOOD PRESSURE: 64 MMHG

## 2022-08-19 DIAGNOSIS — R63.4 WEIGHT LOSS: ICD-10-CM

## 2022-08-19 DIAGNOSIS — F32.A ANXIETY AND DEPRESSION: Primary | ICD-10-CM

## 2022-08-19 DIAGNOSIS — R25.1 TREMOR: ICD-10-CM

## 2022-08-19 DIAGNOSIS — F41.9 ANXIETY AND DEPRESSION: Primary | ICD-10-CM

## 2022-08-19 PROCEDURE — 99214 OFFICE O/P EST MOD 30 MIN: CPT | Performed by: INTERNAL MEDICINE

## 2022-08-19 PROCEDURE — 1123F ACP DISCUSS/DSCN MKR DOCD: CPT | Performed by: INTERNAL MEDICINE

## 2022-08-19 RX ORDER — BUSPIRONE HYDROCHLORIDE 5 MG/1
5 TABLET ORAL 2 TIMES DAILY
Qty: 60 TABLET | Refills: 2 | Status: ON HOLD | OUTPATIENT
Start: 2022-08-19 | End: 2022-09-12

## 2022-08-19 ASSESSMENT — PATIENT HEALTH QUESTIONNAIRE - PHQ9
10. IF YOU CHECKED OFF ANY PROBLEMS, HOW DIFFICULT HAVE THESE PROBLEMS MADE IT FOR YOU TO DO YOUR WORK, TAKE CARE OF THINGS AT HOME, OR GET ALONG WITH OTHER PEOPLE: 0
8. MOVING OR SPEAKING SO SLOWLY THAT OTHER PEOPLE COULD HAVE NOTICED. OR THE OPPOSITE, BEING SO FIGETY OR RESTLESS THAT YOU HAVE BEEN MOVING AROUND A LOT MORE THAN USUAL: 0
5. POOR APPETITE OR OVEREATING: 0
3. TROUBLE FALLING OR STAYING ASLEEP: 0
1. LITTLE INTEREST OR PLEASURE IN DOING THINGS: 0
SUM OF ALL RESPONSES TO PHQ QUESTIONS 1-9: 0
7. TROUBLE CONCENTRATING ON THINGS, SUCH AS READING THE NEWSPAPER OR WATCHING TELEVISION: 0
SUM OF ALL RESPONSES TO PHQ QUESTIONS 1-9: 0
6. FEELING BAD ABOUT YOURSELF - OR THAT YOU ARE A FAILURE OR HAVE LET YOURSELF OR YOUR FAMILY DOWN: 0
4. FEELING TIRED OR HAVING LITTLE ENERGY: 0
9. THOUGHTS THAT YOU WOULD BE BETTER OFF DEAD, OR OF HURTING YOURSELF: 0
SUM OF ALL RESPONSES TO PHQ QUESTIONS 1-9: 0
2. FEELING DOWN, DEPRESSED OR HOPELESS: 0
SUM OF ALL RESPONSES TO PHQ QUESTIONS 1-9: 0
SUM OF ALL RESPONSES TO PHQ9 QUESTIONS 1 & 2: 0

## 2022-08-19 NOTE — PROGRESS NOTES
Danuta Garzon (:  1940) is a [de-identified] y.o. female,Established patient, here for evaluation of the following chief complaint(s):  Depression and Anxiety         ASSESSMENT/PLAN:  1. Anxiety and depression   -Some improvement with anxiety but now is not present with depression on sertraline 100 mg daily. Add buspirone 5 mg twice daily  2. Tremor  -Intermittent resting tremor, refer to neurology for further evaluation  -     Felicitas Rich MD, Neurology, [place]  3. Weight loss   -Weight is down only 2 pounds at this visit, hold off on further work-up since this appears to have slowed down and we will recheck in next visit. Blood work was normal.    Return in about 6 weeks (around 2022) for anxiety/depression, weight. Subjective   SUBJECTIVE/OBJECTIVE:  HPI    She is not sure whether the medication is working. She is taking the sertraline 100 mg daily. Still feels depressed and down. She does not think that has changed. Her  does think it has helped a bit with the anxiety, she is having fewer episodes of high anxiety. She is eating very well, no change in appetite. She still getting intermittent tremor in the right hand and leg. It will come and go randomly. Not necessarily worse with movement. No change in handwriting size, voice has not become softer. Review of Systems       Objective   Physical Exam  Vitals reviewed. Constitutional:       General: She is not in acute distress. Appearance: Normal appearance. She is well-developed. HENT:      Head: Normocephalic and atraumatic. Cardiovascular:      Rate and Rhythm: Normal rate and regular rhythm. Heart sounds: Normal heart sounds. Pulmonary:      Effort: Pulmonary effort is normal. No respiratory distress. Breath sounds: Normal breath sounds. Skin:     General: Skin is warm and dry. Neurological:      Mental Status: She is alert.    Psychiatric:         Mood and Affect: Mood is anxious and

## 2022-08-24 RX ORDER — SERTRALINE HYDROCHLORIDE 100 MG/1
TABLET, FILM COATED ORAL
Qty: 30 TABLET | Refills: 1 | Status: SHIPPED | OUTPATIENT
Start: 2022-08-24 | End: 2022-09-26

## 2022-09-12 ENCOUNTER — HOSPITAL ENCOUNTER (INPATIENT)
Age: 82
LOS: 4 days | Discharge: NURSING, REHAB OR LONG-TERM CARE | DRG: 304 | End: 2022-09-16
Attending: EMERGENCY MEDICINE | Admitting: INTERNAL MEDICINE
Payer: MEDICARE

## 2022-09-12 ENCOUNTER — APPOINTMENT (OUTPATIENT)
Dept: GENERAL RADIOLOGY | Age: 82
DRG: 304 | End: 2022-09-12
Payer: MEDICARE

## 2022-09-12 ENCOUNTER — APPOINTMENT (OUTPATIENT)
Dept: CT IMAGING | Age: 82
DRG: 304 | End: 2022-09-12
Payer: MEDICARE

## 2022-09-12 ENCOUNTER — APPOINTMENT (OUTPATIENT)
Dept: MRI IMAGING | Age: 82
DRG: 304 | End: 2022-09-12
Payer: MEDICARE

## 2022-09-12 DIAGNOSIS — I10 ELEVATED BLOOD PRESSURE READING WITH DIAGNOSIS OF HYPERTENSION: ICD-10-CM

## 2022-09-12 DIAGNOSIS — R42 DIZZINESS: Primary | ICD-10-CM

## 2022-09-12 DIAGNOSIS — R93.0 ABNORMAL COMPUTED TOMOGRAPHY ANGIOGRAPHY OF HEAD: ICD-10-CM

## 2022-09-12 DIAGNOSIS — R29.6 FREQUENT FALLS: ICD-10-CM

## 2022-09-12 PROBLEM — I16.0 HYPERTENSIVE URGENCY: Status: ACTIVE | Noted: 2022-09-12

## 2022-09-12 LAB
A/G RATIO: 1.6 (ref 1.1–2.2)
ALBUMIN SERPL-MCNC: 3.7 G/DL (ref 3.4–5)
ALP BLD-CCNC: 71 U/L (ref 40–129)
ALT SERPL-CCNC: 12 U/L (ref 10–40)
ANION GAP SERPL CALCULATED.3IONS-SCNC: 11 MMOL/L (ref 3–16)
AST SERPL-CCNC: 15 U/L (ref 15–37)
BACTERIA: NORMAL /HPF
BASOPHILS ABSOLUTE: 0 K/UL (ref 0–0.2)
BASOPHILS RELATIVE PERCENT: 0.8 %
BILIRUB SERPL-MCNC: 1.3 MG/DL (ref 0–1)
BILIRUBIN URINE: NEGATIVE
BLOOD, URINE: ABNORMAL
BUN BLDV-MCNC: 13 MG/DL (ref 7–20)
CALCIUM SERPL-MCNC: 8.8 MG/DL (ref 8.3–10.6)
CHLORIDE BLD-SCNC: 103 MMOL/L (ref 99–110)
CLARITY: CLEAR
CO2: 23 MMOL/L (ref 21–32)
COLOR: YELLOW
CREAT SERPL-MCNC: 0.6 MG/DL (ref 0.6–1.2)
EKG ATRIAL RATE: 76 BPM
EKG DIAGNOSIS: NORMAL
EKG P-R INTERVAL: 116 MS
EKG Q-T INTERVAL: 376 MS
EKG QRS DURATION: 70 MS
EKG QTC CALCULATION (BAZETT): 423 MS
EKG R AXIS: 8 DEGREES
EKG T AXIS: 43 DEGREES
EKG VENTRICULAR RATE: 76 BPM
EOSINOPHILS ABSOLUTE: 0 K/UL (ref 0–0.6)
EOSINOPHILS RELATIVE PERCENT: 0.5 %
EPITHELIAL CELLS, UA: 2 /HPF (ref 0–5)
GFR AFRICAN AMERICAN: >60
GFR NON-AFRICAN AMERICAN: >60
GLUCOSE BLD-MCNC: 105 MG/DL (ref 70–99)
GLUCOSE URINE: NEGATIVE MG/DL
HCT VFR BLD CALC: 38.9 % (ref 36–48)
HEMOGLOBIN: 13.2 G/DL (ref 12–16)
HYALINE CASTS: 1 /LPF (ref 0–8)
KETONES, URINE: ABNORMAL MG/DL
LEUKOCYTE ESTERASE, URINE: NEGATIVE
LYMPHOCYTES ABSOLUTE: 0.6 K/UL (ref 1–5.1)
LYMPHOCYTES RELATIVE PERCENT: 12.7 %
MCH RBC QN AUTO: 30 PG (ref 26–34)
MCHC RBC AUTO-ENTMCNC: 34 G/DL (ref 31–36)
MCV RBC AUTO: 88.1 FL (ref 80–100)
MICROSCOPIC EXAMINATION: YES
MONOCYTES ABSOLUTE: 0.5 K/UL (ref 0–1.3)
MONOCYTES RELATIVE PERCENT: 10.2 %
NEUTROPHILS ABSOLUTE: 3.3 K/UL (ref 1.7–7.7)
NEUTROPHILS RELATIVE PERCENT: 75.8 %
NITRITE, URINE: NEGATIVE
PDW BLD-RTO: 14.6 % (ref 12.4–15.4)
PH UA: 5.5 (ref 5–8)
PLATELET # BLD: 164 K/UL (ref 135–450)
PMV BLD AUTO: 8.3 FL (ref 5–10.5)
POTASSIUM REFLEX MAGNESIUM: 4 MMOL/L (ref 3.5–5.1)
PRO-BNP: 1888 PG/ML (ref 0–449)
PROTEIN UA: 30 MG/DL
RBC # BLD: 4.42 M/UL (ref 4–5.2)
RBC UA: 1 /HPF (ref 0–4)
SODIUM BLD-SCNC: 137 MMOL/L (ref 136–145)
SPECIFIC GRAVITY UA: 1.03 (ref 1–1.03)
TOTAL CK: 84 U/L (ref 26–192)
TOTAL PROTEIN: 6 G/DL (ref 6.4–8.2)
TROPONIN: <0.01 NG/ML
URINE REFLEX TO CULTURE: ABNORMAL
URINE TYPE: ABNORMAL
UROBILINOGEN, URINE: 1 E.U./DL
WBC # BLD: 4.4 K/UL (ref 4–11)
WBC UA: 1 /HPF (ref 0–5)

## 2022-09-12 PROCEDURE — 85025 COMPLETE CBC W/AUTO DIFF WBC: CPT

## 2022-09-12 PROCEDURE — 6370000000 HC RX 637 (ALT 250 FOR IP): Performed by: PHYSICIAN ASSISTANT

## 2022-09-12 PROCEDURE — 2060000000 HC ICU INTERMEDIATE R&B

## 2022-09-12 PROCEDURE — 97162 PT EVAL MOD COMPLEX 30 MIN: CPT

## 2022-09-12 PROCEDURE — 82550 ASSAY OF CK (CPK): CPT

## 2022-09-12 PROCEDURE — 93005 ELECTROCARDIOGRAM TRACING: CPT | Performed by: PHYSICIAN ASSISTANT

## 2022-09-12 PROCEDURE — 81001 URINALYSIS AUTO W/SCOPE: CPT

## 2022-09-12 PROCEDURE — 93010 ELECTROCARDIOGRAM REPORT: CPT | Performed by: INTERNAL MEDICINE

## 2022-09-12 PROCEDURE — 94760 N-INVAS EAR/PLS OXIMETRY 1: CPT

## 2022-09-12 PROCEDURE — 6370000000 HC RX 637 (ALT 250 FOR IP): Performed by: INTERNAL MEDICINE

## 2022-09-12 PROCEDURE — 71045 X-RAY EXAM CHEST 1 VIEW: CPT

## 2022-09-12 PROCEDURE — 97530 THERAPEUTIC ACTIVITIES: CPT

## 2022-09-12 PROCEDURE — 2580000003 HC RX 258: Performed by: INTERNAL MEDICINE

## 2022-09-12 PROCEDURE — 72141 MRI NECK SPINE W/O DYE: CPT

## 2022-09-12 PROCEDURE — 70551 MRI BRAIN STEM W/O DYE: CPT

## 2022-09-12 PROCEDURE — 70496 CT ANGIOGRAPHY HEAD: CPT

## 2022-09-12 PROCEDURE — 70450 CT HEAD/BRAIN W/O DYE: CPT

## 2022-09-12 PROCEDURE — 99285 EMERGENCY DEPT VISIT HI MDM: CPT

## 2022-09-12 PROCEDURE — 80053 COMPREHEN METABOLIC PANEL: CPT

## 2022-09-12 PROCEDURE — 84484 ASSAY OF TROPONIN QUANT: CPT

## 2022-09-12 PROCEDURE — 6360000004 HC RX CONTRAST MEDICATION: Performed by: EMERGENCY MEDICINE

## 2022-09-12 PROCEDURE — 83880 ASSAY OF NATRIURETIC PEPTIDE: CPT

## 2022-09-12 RX ORDER — ASPIRIN 81 MG/1
81 TABLET, CHEWABLE ORAL DAILY
Status: DISCONTINUED | OUTPATIENT
Start: 2022-09-13 | End: 2022-09-16 | Stop reason: HOSPADM

## 2022-09-12 RX ORDER — SODIUM CHLORIDE 9 MG/ML
INJECTION, SOLUTION INTRAVENOUS PRN
Status: DISCONTINUED | OUTPATIENT
Start: 2022-09-12 | End: 2022-09-16 | Stop reason: HOSPADM

## 2022-09-12 RX ORDER — SERTRALINE HYDROCHLORIDE 100 MG/1
100 TABLET, FILM COATED ORAL DAILY
Status: DISCONTINUED | OUTPATIENT
Start: 2022-09-12 | End: 2022-09-16 | Stop reason: HOSPADM

## 2022-09-12 RX ORDER — ENOXAPARIN SODIUM 100 MG/ML
40 INJECTION SUBCUTANEOUS DAILY
Status: DISCONTINUED | OUTPATIENT
Start: 2022-09-13 | End: 2022-09-16 | Stop reason: HOSPADM

## 2022-09-12 RX ORDER — ACETAMINOPHEN 325 MG/1
650 TABLET ORAL EVERY 6 HOURS PRN
Status: DISCONTINUED | OUTPATIENT
Start: 2022-09-12 | End: 2022-09-16 | Stop reason: HOSPADM

## 2022-09-12 RX ORDER — BUSPIRONE HYDROCHLORIDE 5 MG/1
5 TABLET ORAL 2 TIMES DAILY
Status: DISCONTINUED | OUTPATIENT
Start: 2022-09-12 | End: 2022-09-16 | Stop reason: HOSPADM

## 2022-09-12 RX ORDER — HYDRALAZINE HYDROCHLORIDE 25 MG/1
50 TABLET, FILM COATED ORAL ONCE
Status: COMPLETED | OUTPATIENT
Start: 2022-09-12 | End: 2022-09-12

## 2022-09-12 RX ORDER — MECLIZINE HCL 12.5 MG/1
12.5 TABLET ORAL EVERY 6 HOURS SCHEDULED
Status: DISPENSED | OUTPATIENT
Start: 2022-09-12 | End: 2022-09-15

## 2022-09-12 RX ORDER — HYDRALAZINE HYDROCHLORIDE 20 MG/ML
20 INJECTION INTRAMUSCULAR; INTRAVENOUS ONCE
Status: DISCONTINUED | OUTPATIENT
Start: 2022-09-12 | End: 2022-09-14

## 2022-09-12 RX ORDER — SODIUM CHLORIDE 0.9 % (FLUSH) 0.9 %
10 SYRINGE (ML) INJECTION PRN
Status: DISCONTINUED | OUTPATIENT
Start: 2022-09-12 | End: 2022-09-16 | Stop reason: HOSPADM

## 2022-09-12 RX ORDER — POTASSIUM CHLORIDE 7.45 MG/ML
10 INJECTION INTRAVENOUS PRN
Status: DISCONTINUED | OUTPATIENT
Start: 2022-09-12 | End: 2022-09-16 | Stop reason: HOSPADM

## 2022-09-12 RX ORDER — POLYETHYLENE GLYCOL 3350 17 G/17G
17 POWDER, FOR SOLUTION ORAL DAILY PRN
Status: DISCONTINUED | OUTPATIENT
Start: 2022-09-12 | End: 2022-09-16 | Stop reason: HOSPADM

## 2022-09-12 RX ORDER — CARVEDILOL 6.25 MG/1
25 TABLET ORAL 2 TIMES DAILY WITH MEALS
Status: DISCONTINUED | OUTPATIENT
Start: 2022-09-12 | End: 2022-09-16 | Stop reason: HOSPADM

## 2022-09-12 RX ORDER — HYDRALAZINE HYDROCHLORIDE 50 MG/1
50 TABLET, FILM COATED ORAL 3 TIMES DAILY
Status: DISCONTINUED | OUTPATIENT
Start: 2022-09-12 | End: 2022-09-13

## 2022-09-12 RX ORDER — POTASSIUM CHLORIDE 20 MEQ/1
40 TABLET, EXTENDED RELEASE ORAL PRN
Status: DISCONTINUED | OUTPATIENT
Start: 2022-09-12 | End: 2022-09-16 | Stop reason: HOSPADM

## 2022-09-12 RX ORDER — LOSARTAN POTASSIUM 25 MG/1
100 TABLET ORAL DAILY
Status: DISCONTINUED | OUTPATIENT
Start: 2022-09-12 | End: 2022-09-16 | Stop reason: HOSPADM

## 2022-09-12 RX ORDER — ONDANSETRON 4 MG/1
4 TABLET, ORALLY DISINTEGRATING ORAL EVERY 8 HOURS PRN
Status: DISCONTINUED | OUTPATIENT
Start: 2022-09-12 | End: 2022-09-16 | Stop reason: HOSPADM

## 2022-09-12 RX ORDER — ACETAMINOPHEN 650 MG/1
650 SUPPOSITORY RECTAL EVERY 6 HOURS PRN
Status: DISCONTINUED | OUTPATIENT
Start: 2022-09-12 | End: 2022-09-16 | Stop reason: HOSPADM

## 2022-09-12 RX ORDER — SODIUM CHLORIDE 0.9 % (FLUSH) 0.9 %
5-40 SYRINGE (ML) INJECTION EVERY 12 HOURS SCHEDULED
Status: DISCONTINUED | OUTPATIENT
Start: 2022-09-12 | End: 2022-09-16 | Stop reason: HOSPADM

## 2022-09-12 RX ORDER — ONDANSETRON 2 MG/ML
4 INJECTION INTRAMUSCULAR; INTRAVENOUS EVERY 6 HOURS PRN
Status: DISCONTINUED | OUTPATIENT
Start: 2022-09-12 | End: 2022-09-16 | Stop reason: HOSPADM

## 2022-09-12 RX ORDER — NIFEDIPINE 30 MG/1
30 TABLET, FILM COATED, EXTENDED RELEASE ORAL DAILY
COMMUNITY

## 2022-09-12 RX ORDER — BUSPIRONE HYDROCHLORIDE 5 MG/1
TABLET ORAL
Qty: 60 TABLET | Refills: 2 | Status: SHIPPED | OUTPATIENT
Start: 2022-09-12

## 2022-09-12 RX ORDER — MAGNESIUM SULFATE IN WATER 40 MG/ML
2000 INJECTION, SOLUTION INTRAVENOUS PRN
Status: DISCONTINUED | OUTPATIENT
Start: 2022-09-12 | End: 2022-09-16 | Stop reason: HOSPADM

## 2022-09-12 RX ADMIN — MECLIZINE 12.5 MG: 12.5 TABLET ORAL at 09:32

## 2022-09-12 RX ADMIN — Medication 10 ML: at 20:47

## 2022-09-12 RX ADMIN — MECLIZINE 12.5 MG: 12.5 TABLET ORAL at 18:16

## 2022-09-12 RX ADMIN — LOSARTAN POTASSIUM 100 MG: 25 TABLET, FILM COATED ORAL at 09:32

## 2022-09-12 RX ADMIN — BUSPIRONE HYDROCHLORIDE 5 MG: 5 TABLET ORAL at 20:47

## 2022-09-12 RX ADMIN — HYDRALAZINE HYDROCHLORIDE 50 MG: 25 TABLET, FILM COATED ORAL at 10:48

## 2022-09-12 RX ADMIN — BUSPIRONE HYDROCHLORIDE 5 MG: 5 TABLET ORAL at 14:07

## 2022-09-12 RX ADMIN — HYDRALAZINE HYDROCHLORIDE 50 MG: 50 TABLET, FILM COATED ORAL at 14:07

## 2022-09-12 RX ADMIN — CARVEDILOL 25 MG: 6.25 TABLET, FILM COATED ORAL at 18:16

## 2022-09-12 RX ADMIN — IOPAMIDOL 75 ML: 755 INJECTION, SOLUTION INTRAVENOUS at 07:49

## 2022-09-12 RX ADMIN — HYDRALAZINE HYDROCHLORIDE 50 MG: 50 TABLET, FILM COATED ORAL at 20:47

## 2022-09-12 RX ADMIN — CARVEDILOL 25 MG: 6.25 TABLET, FILM COATED ORAL at 09:32

## 2022-09-12 RX ADMIN — SERTRALINE 100 MG: 100 TABLET, FILM COATED ORAL at 14:07

## 2022-09-12 ASSESSMENT — ENCOUNTER SYMPTOMS
COUGH: 0
SHORTNESS OF BREATH: 0
NAUSEA: 0
SORE THROAT: 0
BACK PAIN: 0
ABDOMINAL PAIN: 0
VOMITING: 0

## 2022-09-12 ASSESSMENT — LIFESTYLE VARIABLES
HOW MANY STANDARD DRINKS CONTAINING ALCOHOL DO YOU HAVE ON A TYPICAL DAY: PATIENT DOES NOT DRINK
HOW OFTEN DO YOU HAVE A DRINK CONTAINING ALCOHOL: NEVER

## 2022-09-12 NOTE — ED NOTES
Hospital provider in room at this time.      Cristian Brothers RN  09/12/22 4506
Pt attempts to have BM on bedpan. No success. Gladis Marc remains in place. Call light in reach. Side rails up x2.      Brad Ortiz RN  09/12/22 1124
Pt continues resting in bed at this time. Pt provided with blanket and pillow for comfort.  provided with coffee. Call light remains in reach. Pt and  deny needs at this time.      Edwin Devi RN  09/12/22 8453
Pt continues resting in bed. Edd Urbina remains in place.  remains at bedside, call light in reach.      Monica Meier RN  09/12/22 7162
Purewick put into place and suction applied. Pt educated on [address]. Pt verbalizes understanding. Call light remains in reach, side rails remain up x2.      Edwin Devi RN  09/12/22 6485
Urine sample obtained via bed pan. Sample sent to lab. Xray in room to obtain image at this time.      Jazzmine Reis RN  09/12/22 5025
Clear bilaterally, pupils equal, round and reactive to light.

## 2022-09-12 NOTE — ED PROVIDER NOTES
I have personally performed a face to face diagnostic evaluation on this patient. I have fully participated in the care of this patient I personally saw the patient and performed a substantive portion of the visit including all aspects of the medical decision making. I have reviewed and agree with all pertinent clinical information including history, physical exam, diagnostic tests, and the plan. HPI: Amna Kuo presented with multiple falls, dizziness. Over the last month patient has had 5 falls and has been having difficulty standing up. Patient feels like intermittently the room is spinning which causes her falls she was on the floor 2 hours ago and could not get herself up. Denies hitting her head no LOC no neck pain no numbness or weakness. No vision changes. Dizziness is occasionally present at rest.  Again has been going on and constant for the last month. See BARBARA note for further details. Chief Complaint   Patient presents with    Fall    Dizziness     Pt presents to ED with complaints of frequent falls. Pt reports she has  has about 5 fall this  month alone, she reports this has been on going since her PCP has been titration her meds. Pt reports feeling like the room is spinning, resulting in the falls, pt reports she was on the floor for about two hours as she could not get off the floor. Denies hitting head or LOC,  pt not anticoagulated .       Review of Systems: See BARBARA note  Vital Signs: BP (!) 204/68   Pulse 96   Temp 99.1 °F (37.3 °C) (Oral)   Resp 16   Ht 5' 3\" (1.6 m)   Wt 169 lb 14.4 oz (77.1 kg)   SpO2 96%   BMI 30.10 kg/m²     Alert [de-identified] y.o. female who does not appear toxic or acutely ill  HENT: Atraumatic, oral mucosa moist  Neck: Grossly normal ROM  Chest/Lungs: respiratory effort normal   Abdomen: Soft nontender  Musculoskeletal: Grossly normal ROM  Skin: No palor or diaphoresis  Neuro: Bilateral dysmetria with finger-to-nose, tremors bilaterally in the hands, cranial nerves intact, extraocular motions intact, normal strength and sensation bilateral upper and lower extremity    Medical Decision Making and Plan:  Pertinent Labs & Imaging studies reviewed. (See BARBARA chart for details)  I agree with BARBARA assessment and plan. Patient with progressively worsening dizziness for the last month. Medical record shows the patient is being worked up for Parkinson's disease. Patient also showing signs of potential cerebellar dysfunction with dysmetria and worsening dizziness. Will obtain CT head, CTA as well as admit patient for MRI. Patient is afebrile not tachycardic saturating well on room air she is hypertensive. Will control patient's hyper tension with home medications however will allow for some permissive hypertension. EKG without signs of acute ischemia at this time. Will reeval closely and plan on likely admission. Update  CTA shows some focal stenosis of the PCA and ICA branches without large vessel occlusion. We will still plan on admission for MRI for possible subacute CVA.     EKG Interpretation    Interpreted by emergency department physician    Rhythm: normal sinus   Rate: normal  Axis: normal  Ectopy: none  Conduction: normal  ST Segments: normal  T Waves: normal  Q Waves: none    Clinical Impression: Normal sinus rhythm, normal EKG           Jenae Nicole MD  09/12/22 49 Mitra Samuels MD  09/12/22 1049

## 2022-09-12 NOTE — ED PROVIDER NOTES
[address] [facility]      Pt Name: Bismark Edgar  MRN: [id number]  [place] 1940  Date of evaluation: 9/12/2022  Provider: Sary Earl PA-C    This patient was seen and evaluated by the attending physician Dr. Ruby Sauceda      Chief Complaint: fall      Diogenes Yanes 124 time was 31 minutes, excluding separately reportable procedures. There was a high probability of clinically significant/life threatening deterioration in the patient's condition which required my urgent intervention. HISTORYOF PRESENT ILLNESS  (Location/Symptom, Timing/Onset, Context/Setting, Quality, Duration, Modifying Factors, Severity.)   Bismark Edgar is a [de-identified] y.o. female who presents to the emergency department after a fall. The patient reports that she has had 5 falls in the last month. First she told me that her knees just give out on her then she tells me that she actually gets pretty dizzy and this is what is causing the fall. She is somewhat of a poor historian and her history is somewhat unreliable. She describes the dizziness as a room spinning sensation. Nothing seemed to bring it on nothing makes it better or worse. She denies any pain or injury from the fall. She says she spent about 2 hours on the ground trying to get up but she was too weak to get up. She has had normal appetite. Denies any recent illness. She is being worked up for [employer]. She also says she has had some recent blood pressure medication changes but she cannot further elaborate. Nursing Notes were reviewed and I agree. REVIEW OF SYSTEMS    (2-9 systems for level 4, 10 or more forlevel 5)     Review of Systems   Constitutional:  Negative for chills, fatigue and fever. HENT:  Negative for sore throat. Respiratory:  Negative for cough and shortness of breath. Cardiovascular:  Negative for chest pain.    Gastrointestinal:  Negative for abdominal pain, nausea and vomiting. Genitourinary:  Negative for difficulty urinating and dysuria. Musculoskeletal:  Negative for arthralgias and back pain. Skin:  Negative for rash. Neurological:  Positive for dizziness and weakness. Negative for light-headedness, numbness and headaches. Psychiatric/Behavioral:  The patient is not nervous/anxious. All other systems reviewed and are negative. Positives and Pertinent negatives as per HPI. Except as noted above the remainder of the review of systems was reviewed and negative. PAST MEDICALHISTORY         Diagnosis Date    Arthritis     Cataract     High blood pressure     Hyperlipidemia     stress test     normal       SURGICAL HISTORY           Procedure Laterality Date    EYE SURGERY      L eye - injury    JOINT REPLACEMENT      R TKR - Dr Francine Schaffer       Previous Medications    ACETAMINOPHEN (TYLENOL) 325 MG TABLET    Take 650 mg by mouth every 6 hours as needed for Pain    ASPIRIN 81 MG TABLET    Take 81 mg by mouth daily. BUSPIRONE (BUSPAR) 5 MG TABLET    Take 1 tablet by mouth 2 times daily    CARVEDILOL (COREG) 25 MG TABLET    TAKE 1 TABLET BY MOUTH  TWICE DAILY    HYDRALAZINE (APRESOLINE) 50 MG TABLET    Take 50 mg by mouth 3 times daily Take 1 every 8 hours as needed for bp >180    LOSARTAN (COZAAR) 100 MG TABLET    TAKE 1 TABLET BY MOUTH  DAILY    SERTRALINE (ZOLOFT) 100 MG TABLET    TAKE 1 TABLET BY MOUTH EVERY DAY       ALLERGIES     Codeine, Sulfa antibiotics, and Atorvastatin    FAMILY HISTORY           Problem Relation Age of Onset    Cancer Other     High Blood Pressure Other      Family Status   Relation Name Status    Mother      Father      Other  (Not Specified)    Other  (Not Specified)        SOCIAL HISTORY    reports that she has never smoked. She has never used smokeless tobacco. She reports that she does not drink alcohol and does not use drugs.     PHYSICAL EXAM the major arteries of the head   and neck. 2. 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 3. Mild (less than 50%) stenosis of the bilateral proximal ICAs. 4. Multilevel cervical spondylosis, most notable at C5-C6 where there is   moderate (possibly severe) spinal canal stenosis. CT HEAD WO CONTRAST   Final Result   1. No acute intracranial abnormality. LABS:  Labs Reviewed   CBC WITH AUTO DIFFERENTIAL - Abnormal; Notable for the following components:       Result Value    Lymphocytes Absolute 0.6 (*)     All other components within normal limits   COMPREHENSIVE METABOLIC PANEL W/ REFLEX TO MG FOR LOW K - Abnormal; Notable for the following components:    Glucose 105 (*)     Total Protein 6.0 (*)     Total Bilirubin 1.3 (*)     All other components within normal limits   BRAIN NATRIURETIC PEPTIDE - Abnormal; Notable for the following components:    Pro-BNP 1,888 (*)     All other components within normal limits   URINALYSIS WITH REFLEX TO CULTURE - Abnormal; Notable for the following components:    Ketones, Urine TRACE (*)     Blood, Urine TRACE (*)     Protein, UA 30 (*)     All other components within normal limits   TROPONIN   MICROSCOPIC URINALYSIS   CK       When ordered, only abnormal lab results are displayed. All other labs are within normal range or not returned as of the dictation. EMERGENCY DEPARTMENT COURSE and DIFFERENTIAL DIAGNOSIS/MDM:   Vitals:    Vitals:    09/12/22 0634 09/12/22 0636 09/12/22 0806   BP:  (!) 204/68 (!) 211/90   Pulse:  96 78   Resp:  16 20   Temp:  99.1 °F (37.3 °C)    TempSrc:  Oral    SpO2:  96% 93%   Weight: 169 lb 14.4 oz (77.1 kg)     Height: 5' 3\" (1.6 m)         I saw this patient with Dr. Franklin Rock who spent face-to-face time with the patient and agreed with my assessment and plan. The patient was stable and nontoxic appearing.   Within limitations of exam secondary to patient's age her neurologic exam is nonfocal.  She has hypertensive at 211/90. Her home hydralazine has been ordered. The patient's noncontrast head CT was negative. Lab work is reassuring. Chest x-ray clear. Her CTA head and neck with contrast showed no evidence of large vessel occlusion in the major arteries of the head and neck but did show moderate/severe focal stenosis in the left PCA proximal P2 segment and mild less than 50% stenosis of bilateral proximal ICAs. Given the significant number of falls in the last 1 month with this concerning finding on CTA, we are recommending admission the hospital for further inpatient evaluation and management. Hospitalist was reached via [employer]. Patient and her  were updated on results and plan. Is this patient to be included in the SEP-1 Core Measure due to severe sepsis or septic shock? No   Exclusion criteria - the patient is NOT to be included for SEP-1 Core Measure due to:  2+ SIRS criteria are not met      PROCEDURES:  None    FINAL IMPRESSION      1. Dizziness    2. Frequent falls    3. Elevated blood pressure reading with diagnosis of hypertension    4. Abnormal computed tomography angiography of head          DISPOSITION/PLAN   DISPOSITION Admitted 09/12/2022 09:05:36 AM      PATIENT REFERRED TO:  No follow-up provider specified.     MEDICATIONS:  New Prescriptions    No medications on file       (Please note that portions of this note were completed with a voice recognition program.  Efforts were made toedit the dictations but occasionally words are mis-transcribed.)    VICTORINO Gill PA-C  09/12/22 1043

## 2022-09-12 NOTE — PROGRESS NOTES
Medication Reconciliation    List of medications patient is currently taking is complete. Source of information: 1. Conversation with patient and  at bedside                                      2. EPIC records      Allergies  Codeine, Sulfa antibiotics, and Atorvastatin     Notes regarding home medications:   1.  Patient reports last taking medications yesterday      Debra Tena [facility]   9/12/2022  10:27 AM

## 2022-09-12 NOTE — PLAN OF CARE
Problem: Discharge Planning  Goal: Discharge to home or other facility with appropriate resources  Outcome: Progressing  Flowsheets  Taken 9/12/2022 1501  Discharge to home or other facility with appropriate resources: Identify barriers to discharge with patient and caregiver  Taken 9/12/2022 1327  Discharge to home or other facility with appropriate resources: Identify barriers to discharge with patient and caregiver     Problem: Safety - Adult  Goal: Free from fall injury  Outcome: Progressing     Problem: Skin/Tissue Integrity  Goal: Absence of new skin breakdown  Description: 1. Monitor for areas of redness and/or skin breakdown  2. Assess vascular access sites hourly  3. Every 4-6 hours minimum:  Change oxygen saturation probe site  4. Every 4-6 hours:  If on nasal continuous positive airway pressure, respiratory therapy assess nares and determine need for appliance change or resting period.   Outcome: Progressing

## 2022-09-12 NOTE — CARE COORDINATION
INITIAL CASE MANAGEMENT ASSESSMENT    Reviewed chart, met with patient  and her  to assess possible discharge needs. Explained Case Management role/services. Living Situation: lives in a house with her     ADLs: Independent     DME: walker & shower chair    PT/OT Recs: TBD     Active Services: None     Transportation:  to transport at discharge     Medications: [facility] mail order or [facility] on [address]. PCP: Juani Duckworth MD      HD/PD: N/A    PLAN/COMMENTS: Return home with [facility]. Provided home care and SNF list for choice. Advanced Care Planing completed. .      The Plan for Transition of Care is related to the following treatment goals: return  home    The Patient and/or patient representative  was provided with a choice of provider and agrees   with the discharge plan. [x] Yes [] No    Freedom of choice list was provided with basic dialogue that supports the patient's individualized plan of care/goals, treatment preferences and shares the quality data associated with the providers. [x] Yes [] No    SW/CM provided contact information for patient or family to call with any questions. SW/CM will follow and assist as needed. urinary catheter complications

## 2022-09-12 NOTE — H&P
Hospital Medicine History and Physical    9/12/2022    Date of Admission: 9/12/2022    Date of Service: Pt seen/examined on 9/12/2022 and admitted to inpatient. Assessment/plan:  Hypertensive urgency. Systolic blood pressure was greater than 200 at the time of evaluation in the emergency room, likely due to medication noncompliance as patient has not taken medications for about 3 days leading to ER visit. Resume home antihypertensive medications. As needed IV hydralazine for systolic blood pressure greater than 150 mmHg. Dizziness, vertigo and recurrent falls without loss of consciousness. Etiology is multifactorial.  Abnormal finding on CTA-head/neck is concerning for multilevel cervical spondylosis most notable at C5-C6 with moderate (possibly severe) spinal canal stenosis. Will obtain MRI-brain and MRI-cervical spine and consult neurosurgery to assist with evaluation. In the meantime, maintain on fall precautions. Trial of scheduled meclizine. PT/OT consult to assist with evaluation. Treat uncontrolled hypertension. Other comorbidities: history of essential hypertension, hyperlipidemia, osteoarthritis, hearing difficulties. Activities: Up with assist  Prophylaxis: Subcutaneous Lovenox  Code status: Full code    ==========================================================  Chief complaint:  Chief Complaint   Patient presents with    Fall    Dizziness     Pt presents to ED with complaints of frequent falls. Pt reports she has  has about 5 fall this  month alone, she reports this has been on going since her PCP has been titration her meds. Pt reports feeling like the room is spinning, resulting in the falls, pt reports she was on the floor for about two hours as she could not get off the floor. Denies hitting head or LOC,  pt not anticoagulated . History of Presenting Illness:   This is a pleasant [de-identified] y.o. female with history of essential hypertension, hyperlipidemia, osteoarthritis, who hydrALAZINE (APRESOLINE) 50 MG tablet Take 50 mg by mouth 3 times daily Take 1 every 8 hours as needed for bp >180    Historical Provider, MD   aspirin 81 MG tablet Take 81 mg by mouth daily. Historical Provider, MD       Allergy(ies):  Codeine, Sulfa antibiotics, and Atorvastatin    Social History:  TOBACCO:  reports that she has never smoked. She has never used smokeless tobacco.  ETOH:  reports no history of alcohol use. Family History:      Problem Relation Age of Onset    Cancer Other     High Blood Pressure Other        Review of Systems:  Pertinent positives are listed in HPI. At least 10-point ROS reviewed and were negative. Vitals and physical examination:  BP (!) 211/90   Pulse 78   Temp 99.1 °F (37.3 °C) (Oral)   Resp 20   Ht 5' 3\" (1.6 m)   Wt 169 lb 14.4 oz (77.1 kg)   SpO2 93%   BMI 30.10 kg/m²   Gen/overall appearance: Not in acute distress. Alert. Oriented x3. Hard of hearing. Head: Normocephalic, atraumatic  Eyes: EOMI, good acuity  ENT: Oral mucosa moist  Neck: No JVD, thyromegaly  CVS: Nml S1S2, no MRG, RRR  Pulm: Clear bilaterally. No crackles/wheezes  Gastrointestinal: Soft, NT/ND, +BS  Musculoskeletal: No edema. Warm  Neuro: No focal deficit. Moves extremity spontaneously. Psychiatry: Appropriate affect. Not agitated. Skin: Warm, dry with normal turgor.  No rash  Capillary refill: Brisk,< 3 seconds   Peripheral Pulses: +2 palpable, equal bilaterally       Labs/imaging/EKG:  CBC:   Recent Labs     09/12/22  0646   WBC 4.4   HGB 13.2        BMP:    Recent Labs     09/12/22  0646      K 4.0      CO2 23   BUN 13   CREATININE 0.6   GLUCOSE 105*     Hepatic:   Recent Labs     09/12/22  0646   AST 15   ALT 12   BILITOT 1.3*   ALKPHOS 71       CT HEAD WO CONTRAST    Result Date: 9/12/2022  EXAMINATION: CT OF THE HEAD WITHOUT CONTRAST  9/12/2022 6:46 am TECHNIQUE: CT of the head was performed without the administration of intravenous contrast. Automated exposure control, iterative reconstruction, and/or weight based adjustment of the mA/kV was utilized to reduce the radiation dose to as low as reasonably achievable. COMPARISON: 03/16/2021 HISTORY: ORDERING SYSTEM PROVIDED HISTORY: dizziness, frequent falls TECHNOLOGIST PROVIDED HISTORY: Reason for exam:->dizziness, frequent falls Has a \"code stroke\" or \"stroke alert\" been called? ->No Decision Support Exception - unselect if not a suspected or confirmed emergency medical condition->Emergency Medical Condition (MA) Reason for Exam: dizziness, frequent falls FINDINGS: BRAIN/VENTRICLES: There is no acute intracerebral hemorrhage or extra-axial fluid collection. There is mild cerebral atrophy with mild periventricular and deep white matter small vessel ischemic disease. ORBITS: Status post bilateral cataract removal and left retinal banding. SINUSES: Mild mucosal hypertrophy involves the bilateral ethmoid air cells. SOFT TISSUES/SKULL:  The calvarium is intact. 1. No acute intracranial abnormality. XR CHEST PORTABLE    Result Date: 9/12/2022  EXAMINATION: ONE XRAY VIEW OF THE CHEST 9/12/2022 7:17 am COMPARISON: None. HISTORY: ORDERING SYSTEM PROVIDED HISTORY: frequent falls TECHNOLOGIST PROVIDED HISTORY: Reason for exam:->frequent falls Reason for Exam: frequent falls FINDINGS: Both lungs are clear. The heart is normal size. There is no pulmonary vascular congestion or pleural abnormality. There is slight calcification of the aortic arch. No acute findings. CTA HEAD NECK W CONTRAST    Result Date: 9/12/2022  EXAMINATION: CTA OF THE HEAD AND NECK WITH CONTRAST 9/12/2022 7:35 am: TECHNIQUE: CTA of the head and neck was performed with the administration of intravenous contrast. Multiplanar reformatted images are provided for review. MIP images are provided for review. Stenosis of the internal carotid arteries measured using NASCET criteria.  Automated exposure control, iterative reconstruction, and/or weight based adjustment of the mA/kV was utilized to reduce the radiation dose to as low as reasonably achievable. 3D reconstructed images were performed on a separate workstation and provided for review. COMPARISON: None. HISTORY: ORDERING SYSTEM PROVIDED HISTORY: dizziness x 1 month TECHNOLOGIST PROVIDED HISTORY: Reason for exam:->dizziness x 1 month Has a \"code stroke\" or \"stroke alert\" been called? ->No Decision Support Exception - unselect if not a suspected or confirmed emergency medical condition->Emergency Medical Condition (MA) Reason for Exam: dizziness x 1 month FINDINGS: CTA NECK: AORTIC ARCH/ARCH VESSELS: No dissection or arterial injury. No significant stenosis of the brachiocephalic or subclavian arteries. CAROTID ARTERIES: No dissection, arterial injury, or hemodynamically significant stenosis by NASCET criteria. Mild (less than 50%) stenosis of the proximal right internal carotid artery. Mild (less than 50%) stenosis of the proximal left internal carotid artery. VERTEBRAL ARTERIES: No dissection, arterial injury, or significant stenosis. SOFT TISSUES: The lung apices are clear. No cervical or superior mediastinal lymphadenopathy. The larynx and pharynx are unremarkable. No acute abnormality of the salivary and thyroid glands. BONES: No acute osseous abnormality. Straightening the cervical spine with reversal of curvature. Trace anterolisthesis of C3 on C4 and of C4 on C5. Multilevel cervical spondylosis, most notable at C5-C6 where there is moderate (possibly severe) spinal canal stenosis. CTA HEAD: ANTERIOR CIRCULATION: No significant stenosis of the intracranial internal carotid, anterior cerebral, or middle cerebral arteries. No aneurysm. POSTERIOR CIRCULATION: No significant stenosis of the vertebral, basilar, or right posterior cerebral arteries. Moderate/severe focal stenosis in the left posterior cerebral artery proximal P2 segment.   Robust right posterior communicating artery with a hypoplastic right P1 segment, anatomic variant. Small left posterior communicating artery present, anatomic variant. No aneurysm. OTHER: No dural venous sinus thrombosis on this non-dedicated study. BRAIN: No mass effect or midline shift. No extra-axial fluid collection. The gray-white differentiation is maintained. 1. No evidence of large vessel occlusion in the major arteries of the head and neck. 2. 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 3. Mild (less than 50%) stenosis of the bilateral proximal ICAs. 4. Multilevel cervical spondylosis, most notable at C5-C6 where there is moderate (possibly severe) spinal canal stenosis. EKG: Normal sinus rhythm, rate 76 bpm.  No acute ST/T changes. QTc 423. I reviewed EKG. Discussed with CARLOS MANUEL GLEASON.       Thank you Beck Mathias MD for the opportunity to be involved in this patient's care.    -----------------------------  Samanta Peters MD  [facility] [place] hospitalist

## 2022-09-12 NOTE — PROGRESS NOTES
2-3 days  Short term goal 1: Bed mobility SBA  Short term goal 2: Transfers CGA  Short term goal 3: Ambulation 8' with walker, Min A  Short term goal 4: Ascend/Descend 4 steps with Min A  Patient Goals   Patient goals : To get stronger       Education  Patient Education  Education Given To: Patient; Family  Education Provided: Role of Therapy;Plan of Care;Orientation; Fall Prevention Strategies  Education Method: Demonstration;Verbal  Education Outcome: Continued education needed      Therapy Time   Individual Concurrent Group Co-treatment   Time In 9717         Time Out 0201         Minutes 30         Timed Code Treatment Minutes: 15 Minutes   Medium Complexity Evaluation    David Haque PT   Electronically signed by David Haque, BRUON [id number] on 9/12/2022 at 2:31 PM

## 2022-09-13 LAB
A/G RATIO: 1.6 (ref 1.1–2.2)
ALBUMIN SERPL-MCNC: 3.4 G/DL (ref 3.4–5)
ALP BLD-CCNC: 62 U/L (ref 40–129)
ALT SERPL-CCNC: 12 U/L (ref 10–40)
ANION GAP SERPL CALCULATED.3IONS-SCNC: 12 MMOL/L (ref 3–16)
AST SERPL-CCNC: 16 U/L (ref 15–37)
BASOPHILS ABSOLUTE: 0 K/UL (ref 0–0.2)
BASOPHILS RELATIVE PERCENT: 1 %
BILIRUB SERPL-MCNC: 1.1 MG/DL (ref 0–1)
BUN BLDV-MCNC: 13 MG/DL (ref 7–20)
CALCIUM SERPL-MCNC: 8.6 MG/DL (ref 8.3–10.6)
CHLORIDE BLD-SCNC: 101 MMOL/L (ref 99–110)
CO2: 23 MMOL/L (ref 21–32)
CREAT SERPL-MCNC: 0.5 MG/DL (ref 0.6–1.2)
EOSINOPHILS ABSOLUTE: 0 K/UL (ref 0–0.6)
EOSINOPHILS RELATIVE PERCENT: 1.1 %
GFR AFRICAN AMERICAN: >60
GFR NON-AFRICAN AMERICAN: >60
GLUCOSE BLD-MCNC: 88 MG/DL (ref 70–99)
HCT VFR BLD CALC: 37 % (ref 36–48)
HEMOGLOBIN: 12.8 G/DL (ref 12–16)
LYMPHOCYTES ABSOLUTE: 0.8 K/UL (ref 1–5.1)
LYMPHOCYTES RELATIVE PERCENT: 29.7 %
MAGNESIUM: 1.8 MG/DL (ref 1.8–2.4)
MCH RBC QN AUTO: 30.3 PG (ref 26–34)
MCHC RBC AUTO-ENTMCNC: 34.4 G/DL (ref 31–36)
MCV RBC AUTO: 88 FL (ref 80–100)
MONOCYTES ABSOLUTE: 0.3 K/UL (ref 0–1.3)
MONOCYTES RELATIVE PERCENT: 13.1 %
NEUTROPHILS ABSOLUTE: 1.4 K/UL (ref 1.7–7.7)
NEUTROPHILS RELATIVE PERCENT: 55.1 %
PDW BLD-RTO: 14.4 % (ref 12.4–15.4)
PLATELET # BLD: 148 K/UL (ref 135–450)
PMV BLD AUTO: 8.4 FL (ref 5–10.5)
POTASSIUM REFLEX MAGNESIUM: 3.3 MMOL/L (ref 3.5–5.1)
RBC # BLD: 4.21 M/UL (ref 4–5.2)
REASON FOR REJECTION: NORMAL
REJECTED TEST: NORMAL
SODIUM BLD-SCNC: 136 MMOL/L (ref 136–145)
TOTAL PROTEIN: 5.5 G/DL (ref 6.4–8.2)
WBC # BLD: 2.6 K/UL (ref 4–11)

## 2022-09-13 PROCEDURE — 80053 COMPREHEN METABOLIC PANEL: CPT

## 2022-09-13 PROCEDURE — 97530 THERAPEUTIC ACTIVITIES: CPT

## 2022-09-13 PROCEDURE — 97535 SELF CARE MNGMENT TRAINING: CPT

## 2022-09-13 PROCEDURE — 6370000000 HC RX 637 (ALT 250 FOR IP): Performed by: INTERNAL MEDICINE

## 2022-09-13 PROCEDURE — 97166 OT EVAL MOD COMPLEX 45 MIN: CPT

## 2022-09-13 PROCEDURE — 94760 N-INVAS EAR/PLS OXIMETRY 1: CPT

## 2022-09-13 PROCEDURE — 85025 COMPLETE CBC W/AUTO DIFF WBC: CPT

## 2022-09-13 PROCEDURE — 6360000002 HC RX W HCPCS: Performed by: INTERNAL MEDICINE

## 2022-09-13 PROCEDURE — 2060000000 HC ICU INTERMEDIATE R&B

## 2022-09-13 PROCEDURE — 36415 COLL VENOUS BLD VENIPUNCTURE: CPT

## 2022-09-13 PROCEDURE — 83735 ASSAY OF MAGNESIUM: CPT

## 2022-09-13 PROCEDURE — 2580000003 HC RX 258: Performed by: INTERNAL MEDICINE

## 2022-09-13 RX ADMIN — MECLIZINE 12.5 MG: 12.5 TABLET ORAL at 06:16

## 2022-09-13 RX ADMIN — MECLIZINE 12.5 MG: 12.5 TABLET ORAL at 12:08

## 2022-09-13 RX ADMIN — HYDRALAZINE HYDROCHLORIDE 75 MG: 50 TABLET, FILM COATED ORAL at 21:16

## 2022-09-13 RX ADMIN — ASPIRIN 81 MG 81 MG: 81 TABLET ORAL at 08:24

## 2022-09-13 RX ADMIN — ENOXAPARIN SODIUM 40 MG: 100 INJECTION SUBCUTANEOUS at 08:24

## 2022-09-13 RX ADMIN — POTASSIUM BICARBONATE 40 MEQ: 782 TABLET, EFFERVESCENT ORAL at 08:27

## 2022-09-13 RX ADMIN — ALUMINA, MAGNESIA, AND SIMETHICONE ORAL SUSPENSION REGULAR STRENGTH: 1200; 1200; 120 SUSPENSION ORAL at 16:17

## 2022-09-13 RX ADMIN — BUSPIRONE HYDROCHLORIDE 5 MG: 5 TABLET ORAL at 08:23

## 2022-09-13 RX ADMIN — Medication 10 ML: at 08:25

## 2022-09-13 RX ADMIN — MECLIZINE 12.5 MG: 12.5 TABLET ORAL at 01:59

## 2022-09-13 RX ADMIN — BUSPIRONE HYDROCHLORIDE 5 MG: 5 TABLET ORAL at 21:16

## 2022-09-13 RX ADMIN — LOSARTAN POTASSIUM 100 MG: 25 TABLET, FILM COATED ORAL at 08:24

## 2022-09-13 RX ADMIN — SERTRALINE 100 MG: 100 TABLET, FILM COATED ORAL at 08:24

## 2022-09-13 RX ADMIN — MECLIZINE 12.5 MG: 12.5 TABLET ORAL at 17:37

## 2022-09-13 RX ADMIN — Medication 10 ML: at 21:18

## 2022-09-13 RX ADMIN — HYDRALAZINE HYDROCHLORIDE 75 MG: 50 TABLET, FILM COATED ORAL at 08:25

## 2022-09-13 RX ADMIN — CARVEDILOL 25 MG: 6.25 TABLET, FILM COATED ORAL at 08:23

## 2022-09-13 ASSESSMENT — PAIN SCALES - GENERAL
PAINLEVEL_OUTOF10: 0
PAINLEVEL_OUTOF10: 0

## 2022-09-13 NOTE — PROGRESS NOTES
Occupational Therapy  Facility/Department: [facility]  Occupational Therapy Initial Assessment    Name: Mingo Lake  : 1940  MRN: [id number]  Date of Service: 2022    Discharge Recommendations:  Continue to assess pending progress, 3-5 sessions per week  OT Equipment Recommendations  Other: TBD     Mingo Lake scored a 13/24 on the AM-PAC ADL Inpatient form. Current research shows that an AM-PAC score of 17 or less is typically not associated with a discharge to the patient's home setting. Based on the patient's AM-PAC score and their current ADL deficits, it is recommended that the patient have 3-5 sessions per week of Occupational Therapy at d/c to increase the patient's independence. Pt is hopeful she will improve enough for return home at discharge. Cont to assess pending progress. Please see assessment section for further patient specific details. If patient discharges prior to next session this note will serve as a discharge summary. Please see below for the latest assessment towards goals. Patient Diagnosis(es): The primary encounter diagnosis was Dizziness. Diagnoses of Frequent falls, Elevated blood pressure reading with diagnosis of hypertension, and Abnormal computed tomography angiography of head were also pertinent to this visit. Past Medical History:  has a past medical history of Arthritis, Cataract, High blood pressure, Hyperlipidemia, and stress test.  Past Surgical History:  has a past surgical history that includes joint replacement and eye surgery (). Treatment Diagnosis: decreased ADL/IADL, balance      Assessment   Performance deficits / Impairments: Decreased functional mobility ; Decreased safe awareness;Decreased balance;Decreased cognition;Decreased ADL status; Decreased endurance  Assessment: P tis an [de-identified] yr old female admitted with hypertensive urgency, has had multiple falls at home, dizziness.   PTA she lives with her spouse who assists as needed, but she was indep with ADL, mobiitiy ( history of falls). Pt is currrently functioning below baseline in above areas, anticipate she will reequire mod/max assist for LB ADL due to poor activity tolerance, dizziness. She was able to sit to edge of bed today, but did not transfer her or amb due to decrease in BP supine to sit. Pending progress, pt may need cont therapy at discharge 3-5 times per week, but she is hopeful that she has enough improvement that she is able to return home  Treatment Diagnosis: decreased ADL/IADL, balance  Prognosis: Good  Decision Making: Medium Complexity  REQUIRES OT FOLLOW-UP: Yes  Activity Tolerance  Activity Tolerance: Treatment limited secondary to medical complications (free text); Patient limited by fatigue  Activity Tolerance Comments: pt initially so dizzy sitting she needed to lie back down. /57 HOB partially raised, sitting 87/75 without dizziness second time, 96/62 after 10 minutes seated edge of bed. Pt requested to lie back down, nurse notified        Plan   Plan  Times per Week: 3-5  Times per Day: Daily  Current Treatment Recommendations: Balance training, Functional mobility training, Endurance training, Safety education & training, Patient/Caregiver education & training, Self-Care / ADL     Restrictions  Restrictions/Precautions  Restrictions/Precautions: Fall Risk  Position Activity Restriction  Other position/activity restrictions: heart monitor, IV  right wrist    Subjective   General  Additional Pertinent Hx: Mingo Lake is a [de-identified] y.o. female who presents to the emergency department after a fall. The patient reports that she has had 5 falls in the last month. First she told me that her knees just give out on her then she tells me that she actually gets pretty dizzy and this is what is causing the fall. She is somewhat of a poor historian and her history is somewhat unreliable. She describes the dizziness as a room spinning sensation.   Nothing assistance  Grooming Skilled Clinical Factors: brushed teeth seated on edge of bed, combed right side of head, cues to comb left side and back, still needed assist.  Washed face  Toileting: Dependent/Total  Toileting Skilled Clinical Factors: had purewick in place when OT arrived. Removed to sit to edge of bed. Before it could be replaced, pt incontinent of urine through incontinence pad and sheet. Pt assisted minimally to clean, but generally required assist for all. No clothing except gown  Additional Comments: Anticipate pt will require mod/max assist for LB bathing and dressing as seen by her balance, cognition, strength, activity tolerance   Required full change of bed due to saturated in urine, clean purwick applied     Bed mobility  Supine to Sit: Minimal assistance  Sit to Supine: Minimal assistance; Moderate assistance (due to complaint of dizziness)     Vision  Vision: Impaired  Vision Exceptions: Wears glasses for distance (, glasses for distance, had cataract surgery with lens implant for reading)  Hearing  Hearing: Within functional limits  Cognition  Overall Cognitive Status: Exceptions  Memory: Decreased short term memory  Safety Judgement: Decreased awareness of need for assistance  Problem Solving: Assistance required to generate solutions;Assistance required to implement solutions  Insights: Decreased awareness of deficits  Initiation: Requires cues for some  Sequencing: Requires cues for some  Cognition Comment: pt following directions well, but not fully aware of her surroundings, min confused  Orientation  Orientation Level: Oriented to place;Oriented to time;Oriented to person;Disoriented to situation (not fully oriented to situation)                  Education Given To: Patient  Education Provided: Role of Therapy;Plan of Care;Precautions;Transfer Training  Education Method: Verbal;Demonstration; Teach Back  Barriers to Learning: Cognition  Education Outcome: Verbalized understanding;Continued education needed                                                                          AM-PAC Score        AM-PAC Inpatient Daily Activity Raw Score: 13 (09/13/22 1244)  AM-PAC Inpatient ADL T-Scale Score : 32.03 (09/13/22 1244)  ADL Inpatient CMS 0-100% Score: 63.03 (09/13/22 1244)  ADL Inpatient CMS G-Code Modifier : CL (09/13/22 1244)           Goals  Short Term Goals  Time Frame for Short term goals: by discharge  Short Term Goal 1: self care with min assist LB  Short Term Goal 2: toilet with CGA  Short Term Goal 3: transfer with SBA  Short Term Goal 4: functional mobilitly with SBA and LRAD  Short Term Goal 5: increase activity tolerance to complete above tasks  Long Term Goals  Time Frame for Long term goals : same as stg  Patient Goals   Patient goals : \"I want to be able to find out what's wrong\"       Therapy Time   Individual Concurrent Group Co-treatment   Time In 1030         Time Out 1130         Minutes 60         Timed Code Treatment Minutes: 45 Minutes (+15 min eval)       Sarah Sanchez, OTR/L [id number]

## 2022-09-13 NOTE — PLAN OF CARE
[place] neurosurgery note    Called regarding patient yesterday. Was awaiting MRI before eval and discussion. Full consult note to follow.     Lauri Hawk MD

## 2022-09-13 NOTE — CONSULTS
Neurosurgery Consult:    Patient Name: Risa Callahan YOB: 1940   Sex: Female Age: [de-identified] yrs     Medical Record Number: [id number] Acct Number: [de-identified]   Room Number: [facility] Day: Hospital Day: 2     Requesting physician: Tara Posadas MD    Reason for consultation: cervical stenosis    History of present illness: Patient is a [de-identified] y.o. female  has a past medical history of Arthritis, Cataract, High blood pressure, Hyperlipidemia, and stress test.  Who presented after 2 falls at home and  felt she needed to come in. She has had 5 falls in last month. She reports sometimes she has sensation of the room spinning and some times she blacks out for a second. History is a bit hard to obtain as she is somewhat tangential at times. She reports no difficulty with fine motor in her hands. Not dropping items, does buttons and zippers fine. Does have a tremor and is currently being worked up for Scality. Is unsure if she would want any surgery. ROS:   Denies fever or recent illness. Denies chest pain  Denies shortness of breath  Denies changes in bowel or bladder function    VITAL SIGNS   BP (!) 155/66   Pulse 70   Temp 97.8 °F (36.6 °C) (Oral)   Resp 16   Ht 5' 3\" (1.6 m)   Wt 168 lb 6.9 oz (76.4 kg)   SpO2 94%   BMI 29.84 kg/m²    Height Height: 5' 3\" (160 cm)   Weight Weight: 168 lb 6.9 oz (76.4 kg)        Allergies Allergies   Allergen Reactions    Codeine Rash    Sulfa Antibiotics Anaphylaxis and Other (See Comments)    Atorvastatin      Reaction: Increases LFT's      NPO Status ADULT DIET; Regular   Isolation No active isolations     MEDICAL HISTORY   Past Medical History       Diagnosis Date    Arthritis     Cataract     High blood pressure     Hyperlipidemia     stress test 2007    normal      Surgical History    has a past surgical history that includes joint replacement and eye surgery (1985).    Social History   Social History     Occupational History    Not on file   Tobacco Use    Smoking status: Never    Smokeless tobacco: Never   Substance and Sexual Activity    Alcohol use: No     Alcohol/week: 0.0 standard drinks    Drug use: No    Sexual activity: Not on file        The medical history was obtained from the patient & the medical records. The nursing notes, primary physician's notes, and old charts (if applicable) were reviewed. LABS   Basic Metabolic Profile Recent Labs     09/12/22  0646 09/13/22  0431    136    101   CO2 23 23   BUN 13 13   CREATININE 0.6 0.5*   GLUCOSE 105* 88   MG  --  1.80      Complete Blood Count Recent Labs     09/12/22  0646 09/13/22  0826   WBC 4.4 2.6*   RBC 4.42 4.21      Coagulation Studies No results for input(s): PTT, INR in the last 72 hours. Invalid input(s): PLATELETS, PROA, PT, PTTA     MEDICATIONS   Inpatient Medications     hydrALAZINE, 75 mg, Oral, TID    aspirin, 81 mg, Oral, Daily    carvedilol, 25 mg, Oral, BID WC    losartan, 100 mg, Oral, Daily    sertraline, 100 mg, Oral, Daily    meclizine, 12.5 mg, Oral, 4 times per day    busPIRone, 5 mg, Oral, BID    sodium chloride flush, 5-40 mL, IntraVENous, 2 times per day    enoxaparin, 40 mg, SubCUTAneous, Daily    hydrALAZINE, 20 mg, IntraVENous, Once   Infusions    sodium chloride        PRN   sodium chloride flush, 10 mL, IntraVENous, PRN  sodium chloride, , IntraVENous, PRN  ondansetron, 4 mg, Oral, Q8H PRN   Or  ondansetron, 4 mg, IntraVENous, Q6H PRN  polyethylene glycol, 17 g, Oral, Daily PRN  acetaminophen, 650 mg, Oral, Q6H PRN   Or  acetaminophen, 650 mg, Rectal, Q6H PRN  potassium chloride, 40 mEq, Oral, PRN   Or  potassium alternative oral replacement, 40 mEq, Oral, PRN   Or  potassium chloride, 10 mEq, IntraVENous, PRN  magnesium sulfate, 2,000 mg, IntraVENous, PRN       Antibiotics   Recent Abx Admin        No antibiotic orders with administrations found.                        PHYSICAL EXAMINATION     GENERAL: NAD, alert, appears stated age, and cooperative  HEENT: Normocephalic, PERRL, EOMI, neck supple, trachea midline, lips without lesions  RESP: Easy WOB, symmetric chest rise  EXTREMITIES: Extremities WWP  SKIN: Warm and dry  NEURO: A&Ox3, FC - appendicular   CN II-XII grossly intact: no facial droop, EOMI, tongue midline, voice wnl,  hearing intact   HERNANDEZ spontaneously, FROM, Strength 5/5 in BUE and BLEs. Tone normal throughout   Sensation intact to light touch throughout   No clonus, + Bourgeois's on R, equivocal on left   Gait exam deferred 2/2 patient condition    IMAGING   I personally reviewed the patient's imaging which consists of MRI and CT of the cervical spine. CT shows multilevel degenerative changes most significant at C5/6, but sig. Loss of disc height at C4-7. MRI shows multilevel degenerative disease with severe stenosis at C5/6 and questionable patchy signal change, but motion degraded. ASSESSMENT & PLAN   [de-identified] F with severe cervical stenosis with questionable cord signal change and recent falls, but no issues with fine motor in her hands. Plan:  Unclear if her falling is due to her cervical stenosis. The sense that the room is spinning at times suggests another etiology may be in play. Suggest w/u for this as well. No real fine motor/hand symptoms which typically are present with myelopathy. Did discuss risk of losing arm and leg function if it progresses and if big fall, could become paralyzed. No surgical intervention while on ASA. Would recommend continued w/u of dizziness and occasional blacking out and then see me in office in next 1-2 weeks to further discuss possible surgical intervention with family present. Recommend walker and miami J collar for safety if discharged. Thank you for the consultation.     Og Estrada. [address]

## 2022-09-13 NOTE — CARE COORDINATION
Met with patient. Discussed PT/OT recs for skilled nursing facility (SNF). SNF list given to patient. Follow up on patient facility choices tomorrow. Will need pre cert. The Plan for Transition of Care is related to the following treatment goals: strengthening    The patient was provided with a choice of provider and agrees   with the discharge plan. [x] Yes [] No    Freedom of choice list was provided with basic dialogue that supports the patient's individualized plan of care/goals, treatment preferences and shares the quality data associated with the providers.  [x] Yes [] No    Electronically signed by Lisbeth Bahena RN Case Management [phone] on 9/13/2022 at 3:44 PM

## 2022-09-13 NOTE — PROGRESS NOTES
Hospital Medicine Progress Note     Date:  9/13/2022    PCP: Marlo Dyer MD (Tel: [phone])    Date of Admission: 9/12/2022    Chief complaint:   Chief Complaint   Patient presents with    Fall    Dizziness     Pt presents to ED with complaints of frequent falls. Pt reports she has  has about 5 fall this  month alone, she reports this has been on going since her PCP has been titration her meds. Pt reports feeling like the room is spinning, resulting in the falls, pt reports she was on the floor for about two hours as she could not get off the floor. Denies hitting head or LOC,  pt not anticoagulated . Brief admission history:- 37-year-old female with history of essential hypertension, hyperlipidemia, osteoarthritis, who presents to the emergency room with complaints of recurrent falls. Patient was accompanied by her . They both reported that patient has been having multiple falls, at least once daily over the last 3 days prior to arrival in the emergency room. Patient describes dizziness, occasional room spinning sensation, leading to the fall; she has not lost consciousness. In the emergency room, she was noted to have significantly elevated blood pressure and patient states that she did not take her blood pressure medications for about 3 days (due to difficulty getting to pharmacy to  prescription). CT-head was unremarkable for acute pathology. CTA-head/neck reveals moderate to severe focal stenosis in the left PCA proximal P2 segment, mild (less than 50%) stenosis of the bilateral proximal ICAs, multilevel cervical spondylosis most notable at C5-C6 where there is moderate (possibly severe) spinal canal stenosis; no evidence of large vessel occlusion in the major arteries of the head and neck. Assessment/plan:  Hypertensive urgency.   Antihypertensive medications have been adjusted (increased hydralazine from 50 mg 3 times daily to 75 mg 3 times daily, with hold parameters). Continue blood pressure medications as ordered. Monitor vitals closely. Abnormal MRI-cervical spine concerning for cervical myelopathy. MRI-cervical spine reveals severe degenerative changes notably at C5-C6 with findings worrisome for early cervical myelopathy, moderate to severe left foraminal narrowing due to uncovertebral spurring at C6-C7. Neurosurgery has been consulted to help assist with evaluation and recommendations. Dizziness, vertigo and recurrent falls without loss of consciousness. Etiology is unclear, could be due to uncontrolled hypertension. She had abnormal MRI-cervical spine and neurosurgery evaluation is pending. MRI-brain is unremarkable for acute CVA. Maintain on fall precautions. Therapy evaluation. Other comorbidities: history of essential hypertension, hyperlipidemia, osteoarthritis, hearing difficulties. Diet: ADULT DIET; Regular    Code status: Full Code   ----------  Subjective  Patient has no complaints today. Denies any needs. Objective  Physical exam:  Vitals: BP (!) 155/66   Pulse 70   Temp 99.2 °F (37.3 °C) (Oral)   Resp 16   Ht 5' 3\" (1.6 m)   Wt 168 lb 6.9 oz (76.4 kg)   SpO2 93%   BMI 29.84 kg/m²   Gen/overall appearance: Not in acute distress. Alert. Oriented x3. Head: Normocephalic, atraumatic  Eyes: EOMI, good acuity  ENT: Oral mucosa moist  Neck: No JVD, thyromegaly  CVS: Nml S1S2, no MRG, RRR  Pulm: Clear bilaterally. No crackles/wheezes  Gastrointestinal: Soft, NT/ND, +BS  Musculoskeletal: No edema. Warm  Neuro: No focal deficit. Moves extremity spontaneously. Psychiatry: Appropriate affect. Not agitated. Skin: Warm, dry with normal turgor. No rash  Capillary refill: Brisk,< 3 seconds   Peripheral Pulses: +2 palpable, equal bilaterally     24HR INTAKE/OUTPUT:  No intake or output data in the 24 hours ending 09/13/22 0637  No intake/output data recorded. No intake/output data recorded.   Meds:    potassium bicarb-citric acid  40 mEq Oral Once    hydrALAZINE  75 mg Oral TID    aspirin  81 mg Oral Daily    carvedilol  25 mg Oral BID WC    losartan  100 mg Oral Daily    sertraline  100 mg Oral Daily    meclizine  12.5 mg Oral 4 times per day    busPIRone  5 mg Oral BID    sodium chloride flush  5-40 mL IntraVENous 2 times per day    enoxaparin  40 mg SubCUTAneous Daily    hydrALAZINE  20 mg IntraVENous Once     Infusions:    sodium chloride       PRN Meds: sodium chloride flush, sodium chloride, ondansetron **OR** ondansetron, polyethylene glycol, acetaminophen **OR** acetaminophen, potassium chloride **OR** potassium alternative oral replacement **OR** potassium chloride, magnesium sulfate    Labs/imaging:  CBC:   Recent Labs     09/12/22 0646   WBC 4.4   HGB 13.2        BMP:    Recent Labs     09/12/22 0646 09/13/22 0431    136   K 4.0 3.3*    101   CO2 23 23   BUN 13 13   CREATININE 0.6 0.5*   GLUCOSE 105* 88     Hepatic:   Recent Labs     09/12/22 0646 09/13/22  0431   AST 15 16   ALT 12 12   BILITOT 1.3* 1.1*   ALKPHOS 71 62       Alicia Hernandez MD  -------------------------------  [facility]

## 2022-09-14 LAB
LV EF: 68 %
LVEF MODALITY: NORMAL
SARS-COV-2, PCR: DETECTED

## 2022-09-14 PROCEDURE — U0005 INFEC AGEN DETEC AMPLI PROBE: HCPCS

## 2022-09-14 PROCEDURE — 6370000000 HC RX 637 (ALT 250 FOR IP): Performed by: INTERNAL MEDICINE

## 2022-09-14 PROCEDURE — 94760 N-INVAS EAR/PLS OXIMETRY 1: CPT

## 2022-09-14 PROCEDURE — 93306 TTE W/DOPPLER COMPLETE: CPT

## 2022-09-14 PROCEDURE — U0003 INFECTIOUS AGENT DETECTION BY NUCLEIC ACID (DNA OR RNA); SEVERE ACUTE RESPIRATORY SYNDROME CORONAVIRUS 2 (SARS-COV-2) (CORONAVIRUS DISEASE [COVID-19]), AMPLIFIED PROBE TECHNIQUE, MAKING USE OF HIGH THROUGHPUT TECHNOLOGIES AS DESCRIBED BY CMS-2020-01-R: HCPCS

## 2022-09-14 PROCEDURE — 2580000003 HC RX 258: Performed by: INTERNAL MEDICINE

## 2022-09-14 PROCEDURE — 6360000002 HC RX W HCPCS: Performed by: INTERNAL MEDICINE

## 2022-09-14 PROCEDURE — 2060000000 HC ICU INTERMEDIATE R&B

## 2022-09-14 RX ORDER — HYDRALAZINE HYDROCHLORIDE 25 MG/1
25 TABLET, FILM COATED ORAL 3 TIMES DAILY
Status: DISCONTINUED | OUTPATIENT
Start: 2022-09-14 | End: 2022-09-16

## 2022-09-14 RX ORDER — SODIUM CHLORIDE AND POTASSIUM CHLORIDE .9; .15 G/100ML; G/100ML
SOLUTION INTRAVENOUS CONTINUOUS
Status: DISCONTINUED | OUTPATIENT
Start: 2022-09-14 | End: 2022-09-15

## 2022-09-14 RX ORDER — HYDRALAZINE HYDROCHLORIDE 50 MG/1
50 TABLET, FILM COATED ORAL 3 TIMES DAILY
Status: DISCONTINUED | OUTPATIENT
Start: 2022-09-14 | End: 2022-09-14

## 2022-09-14 RX ORDER — NIFEDIPINE 30 MG/1
30 TABLET, EXTENDED RELEASE ORAL DAILY
Status: DISCONTINUED | OUTPATIENT
Start: 2022-09-14 | End: 2022-09-16 | Stop reason: HOSPADM

## 2022-09-14 RX ADMIN — BUSPIRONE HYDROCHLORIDE 5 MG: 5 TABLET ORAL at 20:33

## 2022-09-14 RX ADMIN — MECLIZINE 12.5 MG: 12.5 TABLET ORAL at 17:23

## 2022-09-14 RX ADMIN — MECLIZINE 12.5 MG: 12.5 TABLET ORAL at 12:07

## 2022-09-14 RX ADMIN — SERTRALINE 100 MG: 100 TABLET, FILM COATED ORAL at 09:13

## 2022-09-14 RX ADMIN — LOSARTAN POTASSIUM 100 MG: 25 TABLET, FILM COATED ORAL at 09:27

## 2022-09-14 RX ADMIN — ASPIRIN 81 MG 81 MG: 81 TABLET ORAL at 09:14

## 2022-09-14 RX ADMIN — MECLIZINE 12.5 MG: 12.5 TABLET ORAL at 05:46

## 2022-09-14 RX ADMIN — ENOXAPARIN SODIUM 40 MG: 100 INJECTION SUBCUTANEOUS at 09:15

## 2022-09-14 RX ADMIN — Medication 10 ML: at 09:25

## 2022-09-14 RX ADMIN — HYDRALAZINE HYDROCHLORIDE 25 MG: 25 TABLET, FILM COATED ORAL at 20:33

## 2022-09-14 RX ADMIN — BUSPIRONE HYDROCHLORIDE 5 MG: 5 TABLET ORAL at 09:13

## 2022-09-14 RX ADMIN — CARVEDILOL 25 MG: 6.25 TABLET, FILM COATED ORAL at 09:24

## 2022-09-14 RX ADMIN — HYDRALAZINE HYDROCHLORIDE 50 MG: 50 TABLET, FILM COATED ORAL at 09:23

## 2022-09-14 RX ADMIN — POTASSIUM CHLORIDE AND SODIUM CHLORIDE: 900; 150 INJECTION, SOLUTION INTRAVENOUS at 10:30

## 2022-09-14 NOTE — PROGRESS NOTES
MD Twin ayoub served regarding patient's orthostatic blood pressures. Asked MD if OK to give cardiac medications. MD OKed to give all cardiac medications as scheduled.      Electronically signed by Spencer Anglin RN on 9/14/2022 at 9:13 AM

## 2022-09-14 NOTE — PROGRESS NOTES
[facility] Medicine Progress Note     Date:  9/14/2022    PCP: Wilfredo Santiago MD (Tel: [phone])    Date of Admission: 9/12/2022    Chief complaint:   Chief Complaint   Patient presents with    Fall    Dizziness     Pt presents to ED with complaints of frequent falls. Pt reports she has  has about 5 fall this  month alone, she reports this has been on going since her PCP has been titration her meds. Pt reports feeling like the room is spinning, resulting in the falls, pt reports she was on the floor for about two hours as she could not get off the floor. Denies hitting head or LOC,  pt not anticoagulated . Brief admission history:- 57-year-old female with history of essential hypertension, hyperlipidemia, osteoarthritis, who presents to the emergency room with complaints of recurrent falls. Patient was accompanied by her . They both reported that patient has been having multiple falls, at least once daily over the last 3 days prior to arrival in the emergency room. Patient describes dizziness, occasional room spinning sensation, leading to the fall; she has not lost consciousness. In the emergency room, she was noted to have significantly elevated blood pressure and patient states that she did not take her blood pressure medications for about 3 days (due to difficulty getting to pharmacy to  prescription). CT-head was unremarkable for acute pathology. CTA-head/neck reveals moderate to severe focal stenosis in the left PCA proximal P2 segment, mild (less than 50%) stenosis of the bilateral proximal ICAs, multilevel cervical spondylosis most notable at C5-C6 where there is moderate (possibly severe) spinal canal stenosis; no evidence of large vessel occlusion in the major arteries of the head and neck. Assessment/plan:  Hypertensive urgency. Likely secondary to medication noncompliance. Blood pressure improved with list to have prescribed home antihypertensive medications.   As of 9/14/2022, patient placed on home dose of antihypertensive medications. Monitor blood pressure overnight. As needed IV hydralazine for systolic blood pressure greater than 150 mmHg. Checking echocardiogram.  Orthostatic dizziness. Likely secondary to decreased oral intake. Orthostatic vitals positive on 9/14/2022. Compression stockings ordered. Education on acclimatization techniques. Discussed with patient and recommended need to ambulate with a walker, especially with findings concerning for cervical myelopathy on MRI. Will give intravenous fluid overnight. Abnormal MRI-cervical spine concerning for cervical myelopathy. MRI-cervical spine reveals severe degenerative changes notably at C5-C6 with findings worrisome for early cervical myelopathy, moderate to severe left foraminal narrowing due to uncovertebral spurring at C6-C7. Patient has been evaluated by neurosurgeon, recommends J collar, ambulation with walker. Also discussed with patient to avoid falling, due to risk of paralysis. Patient will follow-up with neurosurgeon as outpatient. Dizziness, vertigo and recurrent falls without loss of consciousness. Likely secondary to #2 as noted above. Other comorbidities: history of essential hypertension, hyperlipidemia, osteoarthritis, hearing difficulties. Disposition. Possible discharge to SNF tomorrow. Diet: ADULT DIET; Regular    Code status: Full Code   ----------  Subjective  No new issues. Denies any needs. Objective  Physical exam:  Vitals: BP (!) 156/66   Pulse 79   Temp 98.7 °F (37.1 °C) (Oral)   Resp 14   Ht 5' 3\" (1.6 m)   Wt 166 lb 10.7 oz (75.6 kg)   SpO2 96%   BMI 29.52 kg/m²   Gen/overall appearance: Not in acute distress. Alert. Oriented x3. Head: Normocephalic, atraumatic  Eyes: EOMI, good acuity  ENT: Oral mucosa moist  Neck: No JVD, thyromegaly  CVS: Nml S1S2, no MRG, RRR  Pulm: Clear bilaterally.  No crackles/wheezes  Gastrointestinal: Soft, NT/ND, +BS  Musculoskeletal: No edema. Warm  Neuro: No focal deficit. Moves extremity spontaneously. Psychiatry: Appropriate affect. Not agitated. Skin: Warm, dry with normal turgor. No rash  Capillary refill: Brisk,< 3 seconds   Peripheral Pulses: +2 palpable, equal bilaterally     24HR INTAKE/OUTPUT:    Intake/Output Summary (Last 24 hours) at 9/14/2022 0854  Last data filed at 9/14/2022 0721  Gross per 24 hour   Intake 390 ml   Output 800 ml   Net -410 ml     I/O last 3 completed shifts:   In: 240 [P.O.:240]  Out: -   I/O this shift:  In: 150 [P.O.:150]  Out: 800 [Urine:800]  Meds:    NIFEdipine  30 mg Oral Daily    hydrALAZINE  50 mg Oral TID    aspirin  81 mg Oral Daily    carvedilol  25 mg Oral BID WC    losartan  100 mg Oral Daily    sertraline  100 mg Oral Daily    meclizine  12.5 mg Oral 4 times per day    busPIRone  5 mg Oral BID    sodium chloride flush  5-40 mL IntraVENous 2 times per day    enoxaparin  40 mg SubCUTAneous Daily    hydrALAZINE  20 mg IntraVENous Once     Infusions:    0.9% NaCl with KCl 20 mEq      sodium chloride       PRN Meds: sodium chloride flush, sodium chloride, ondansetron **OR** ondansetron, polyethylene glycol, acetaminophen **OR** acetaminophen, potassium chloride **OR** potassium alternative oral replacement **OR** potassium chloride, magnesium sulfate    Labs/imaging:  CBC:   Recent Labs     09/12/22  0646 09/13/22  0826   WBC 4.4 2.6*   HGB 13.2 12.8    148       BMP:    Recent Labs     09/12/22  0646 09/13/22  0431    136   K 4.0 3.3*    101   CO2 23 23   BUN 13 13   CREATININE 0.6 0.5*   GLUCOSE 105* 88       Hepatic:   Recent Labs     09/12/22  0646 09/13/22  0431   AST 15 16   ALT 12 12   BILITOT 1.3* 1.1*   ALKPHOS 71 62         Brittnee Gordon MD  -------------------------------  [facility]

## 2022-09-14 NOTE — DISCHARGE INSTR - COC
Continuity of Care Form    Patient Name: Ollie Awad   :  1940  MRN:  [id number]    Admit date:  2022  Discharge date:  ***    Code Status Order: Full Code   Advance Directives:     Admitting Physician:  Janneth Cam MD  PCP: Kory Alvarez MD    Discharging Nurse: [facility] Unit/Room#: S8Q-1751/8327-37  Discharging Unit Phone Number: ***    Emergency Contact:   Extended Emergency Contact Information  Primary Emergency Contact: Hero Sellers  Address: [address] [place] [address], [address] [place] [address] Phone: [phone]  Relation: Spouse  Secondary Emergency Contact: [facility]  Mobile Phone: [phone]  Relation: Child  Preferred language: English   needed?  No    Past Surgical History:  Past Surgical History:   Procedure Laterality Date    EYE SURGERY  1985    L eye - injury    JOINT REPLACEMENT      R TKR - Dr Aleja Zamorano       Immunization History:   Immunization History   Administered Date(s) Administered    COVID-19, PFIZER PURPLE top, DILUTE for use, (age 15 y+), 30mcg/0.3mL 2021, 2021, 10/14/2021    Influenza Virus Vaccine 10/05/2012, 10/29/2013, 2014    Influenza Whole 10/04/2010, 10/07/2011    Influenza, FLUAD, (age 72 y+), Adjuvanted, 0.5mL 10/14/2021    Influenza, High Dose (Fluzone 65 yrs and older) 2015, 2016, 2017, 10/22/2018    Influenza, Triv, inactivated, subunit, adjuvanted, IM (Fluad 65 yrs and older) 10/01/2019    Pneumococcal Conjugate 13-valent (Wojfjie26) 2015    Pneumococcal Polysaccharide ([id number]) 2018    Td, unspecified formulation 2010    Zoster Live (Zostavax) 2012       Active Problems:  Patient Active Problem List   Diagnosis Code    Hypertension I10    Hyperlipidemia E78.5    Hypercalcemia E83.52    Generalized weakness R53.1    Edema R60.9    Stress F43.9    Bacteriuria, asymptomatic R82.71    Transient cerebral ischemia G45.9    Morbid obesity due to excess calories (HCC) E66.01    History of total knee arthroplasty, right Z96.651    Primary osteoarthritis of left knee M17.12    Seasonal affective disorder (HCC) F33.8    Obstipation K59.00    Weight loss R63.4    Anxiety and depression F41.9, F32. A    Tremor of right hand R25.1    Dizziness R42    Hypertensive urgency I16.0       Isolation/Infection:   Isolation            No Isolation          Patient Infection Status       Infection Onset Added Last Indicated Last Indicated By Review Planned Expiration Resolved Resolved By    None active    Resolved    C-diff Rule Out 03/16/21 03/16/21 03/16/21 Clostridium Difficile Toxin/Antigen (Ordered)   03/18/21 Blessing Zamora RN    COVID-19 (Rule Out) 03/16/21 03/16/21 03/16/21 COVID-19, Rapid (Ordered)   03/16/21 Rule-Out Test Resulted            Nurse Assessment:  Last Vital Signs: BP (!) 156/66   Pulse 79   Temp 98.7 °F (37.1 °C) (Oral)   Resp 14   Ht 5' 3\" (1.6 m)   Wt 166 lb 10.7 oz (75.6 kg)   SpO2 96%   BMI 29.52 kg/m²     Last documented pain score (0-10 scale): Pain Level: 0  Last Weight:   Wt Readings from Last 1 Encounters:   09/14/22 166 lb 10.7 oz (75.6 kg)     Mental Status:  {IP PT MENTAL STATUS:20030}    IV Access:  { [facility] IV ACCESS:754098877}    Nursing Mobility/ADLs:  Walking   {[facility] DME EQUK:964217491}  Transfer  {[facility] DME ZTKF:631566490}  Bathing  {[facility] DME JPLQ:472342754}  Dressing  {[facility] DME JTIO:704561724}  Toileting  {[facility] DME QEPT:167398967}  Feeding  {[facility] DME WSUQ:172956593}  Med Admin  {[facility] DME KLGD:136127333}  Med Delivery   { [facility] MED Delivery:845685986}    Wound Care Documentation and Therapy:        Elimination:  Continence:    Bowel: {YES / GM:56158}  Bladder: {YES / CF:84589}  Urinary Catheter: {Urinary Catheter:188428746}   Colostomy/Ileostomy/Ileal Conduit: {YES / TW:37482}       Date of Last BM: ***    Intake/Output Summary (Last 24 hours) at 9/14/2022 0849  Last data filed at 9/14/2022 0721  Gross per 24 hour   Intake 390 ml Admission H&P: No change in H&P    PHYSICIAN SIGNATURE:  Electronically signed by Becky Luque MD on 9/14/22 at 8:49 AM EDT

## 2022-09-14 NOTE — PROGRESS NOTES
Occupational Therapy      Attempted to see pt for OT tx. Pt leaving floor for echo. Will follow up as schedule and pt condition permit.     Electronically signed by Conchita Valadez OT on 9/14/2022 at 2:57 PM

## 2022-09-14 NOTE — CARE COORDINATION
Met with patient & . They would like referral to Home at [facility] as this facility is close to home. Referral made. Will need a pre cert. Covid test ordered for SNF placement, RN made aware.    Electronically signed by Idalia Mo RN Case Management on 9/14/2022 at 10:08 AM

## 2022-09-14 NOTE — PROGRESS NOTES
Physical Therapy  Attempted PT/OT session, pt leaving floor for echo at this time. Will attempt again tomorrow. Also neurosurgery recommends cervical [place] and [place] J collar for safety.     Electronically signed by Harika Garzon, PT on 9/14/2022 at 2:58 PM

## 2022-09-14 NOTE — PROGRESS NOTES
Call placed to [facility] regarding neck brace. [facility] said they will follow up with patient and get this ordered.     Electronically signed by Cristian Montelongo RN on 9/14/2022 at 4:16 PM

## 2022-09-14 NOTE — FLOWSHEET NOTE
09/14/22 0830   Vital Signs   Orthostatic B/P and Pulse?  Yes   Blood Pressure Lying 166/65   Pulse Lying 79 PER MINUTE   Blood Pressure Sitting 163/69   Pulse Sitting 79 PER MINUTE   Blood Pressure Standing 133/98  (RN notified.)   Pulse Standing 84 PER MINUTE

## 2022-09-15 LAB
ALBUMIN SERPL-MCNC: 3.1 G/DL (ref 3.4–5)
ANION GAP SERPL CALCULATED.3IONS-SCNC: 9 MMOL/L (ref 3–16)
BUN BLDV-MCNC: 14 MG/DL (ref 7–20)
CALCIUM SERPL-MCNC: 8.1 MG/DL (ref 8.3–10.6)
CHLORIDE BLD-SCNC: 101 MMOL/L (ref 99–110)
CO2: 23 MMOL/L (ref 21–32)
CREAT SERPL-MCNC: 0.5 MG/DL (ref 0.6–1.2)
GFR AFRICAN AMERICAN: >60
GFR NON-AFRICAN AMERICAN: >60
GLUCOSE BLD-MCNC: 96 MG/DL (ref 70–99)
MAGNESIUM: 1.8 MG/DL (ref 1.8–2.4)
PHOSPHORUS: 2.7 MG/DL (ref 2.5–4.9)
POTASSIUM SERPL-SCNC: 3.9 MMOL/L (ref 3.5–5.1)
SODIUM BLD-SCNC: 133 MMOL/L (ref 136–145)

## 2022-09-15 PROCEDURE — 2060000000 HC ICU INTERMEDIATE R&B

## 2022-09-15 PROCEDURE — 97530 THERAPEUTIC ACTIVITIES: CPT

## 2022-09-15 PROCEDURE — 80069 RENAL FUNCTION PANEL: CPT

## 2022-09-15 PROCEDURE — 94760 N-INVAS EAR/PLS OXIMETRY 1: CPT

## 2022-09-15 PROCEDURE — 6370000000 HC RX 637 (ALT 250 FOR IP): Performed by: INTERNAL MEDICINE

## 2022-09-15 PROCEDURE — 97535 SELF CARE MNGMENT TRAINING: CPT

## 2022-09-15 PROCEDURE — 6360000002 HC RX W HCPCS: Performed by: INTERNAL MEDICINE

## 2022-09-15 PROCEDURE — 2580000003 HC RX 258: Performed by: INTERNAL MEDICINE

## 2022-09-15 PROCEDURE — 83735 ASSAY OF MAGNESIUM: CPT

## 2022-09-15 PROCEDURE — 93971 EXTREMITY STUDY: CPT

## 2022-09-15 PROCEDURE — 36415 COLL VENOUS BLD VENIPUNCTURE: CPT

## 2022-09-15 PROCEDURE — 97530 THERAPEUTIC ACTIVITIES: CPT | Performed by: PHYSICAL THERAPIST

## 2022-09-15 RX ADMIN — POTASSIUM CHLORIDE AND SODIUM CHLORIDE: 900; 150 INJECTION, SOLUTION INTRAVENOUS at 00:11

## 2022-09-15 RX ADMIN — MECLIZINE 12.5 MG: 12.5 TABLET ORAL at 06:26

## 2022-09-15 RX ADMIN — HYDRALAZINE HYDROCHLORIDE 25 MG: 25 TABLET, FILM COATED ORAL at 14:16

## 2022-09-15 RX ADMIN — ACETAMINOPHEN 650 MG: 325 TABLET ORAL at 08:32

## 2022-09-15 RX ADMIN — LOSARTAN POTASSIUM 100 MG: 25 TABLET, FILM COATED ORAL at 08:33

## 2022-09-15 RX ADMIN — CARVEDILOL 25 MG: 6.25 TABLET, FILM COATED ORAL at 17:16

## 2022-09-15 RX ADMIN — HYDRALAZINE HYDROCHLORIDE 25 MG: 25 TABLET, FILM COATED ORAL at 19:59

## 2022-09-15 RX ADMIN — ACETAMINOPHEN 650 MG: 325 TABLET ORAL at 00:07

## 2022-09-15 RX ADMIN — CARVEDILOL 25 MG: 6.25 TABLET, FILM COATED ORAL at 08:32

## 2022-09-15 RX ADMIN — SERTRALINE 100 MG: 100 TABLET, FILM COATED ORAL at 08:33

## 2022-09-15 RX ADMIN — MECLIZINE 12.5 MG: 12.5 TABLET ORAL at 00:08

## 2022-09-15 RX ADMIN — Medication 10 ML: at 08:32

## 2022-09-15 RX ADMIN — ASPIRIN 81 MG 81 MG: 81 TABLET ORAL at 08:33

## 2022-09-15 RX ADMIN — ENOXAPARIN SODIUM 40 MG: 100 INJECTION SUBCUTANEOUS at 08:34

## 2022-09-15 RX ADMIN — BUSPIRONE HYDROCHLORIDE 5 MG: 5 TABLET ORAL at 08:33

## 2022-09-15 RX ADMIN — NIFEDIPINE 30 MG: 30 TABLET, EXTENDED RELEASE ORAL at 08:33

## 2022-09-15 RX ADMIN — BUSPIRONE HYDROCHLORIDE 5 MG: 5 TABLET ORAL at 19:59

## 2022-09-15 RX ADMIN — HYDRALAZINE HYDROCHLORIDE 25 MG: 25 TABLET, FILM COATED ORAL at 08:33

## 2022-09-15 ASSESSMENT — PAIN SCALES - WONG BAKER: WONGBAKER_NUMERICALRESPONSE: 2

## 2022-09-15 ASSESSMENT — PAIN DESCRIPTION - ORIENTATION
ORIENTATION: LEFT;RIGHT
ORIENTATION: LEFT

## 2022-09-15 ASSESSMENT — PAIN DESCRIPTION - DESCRIPTORS
DESCRIPTORS: CRAMPING
DESCRIPTORS: CRAMPING

## 2022-09-15 ASSESSMENT — PAIN - FUNCTIONAL ASSESSMENT: PAIN_FUNCTIONAL_ASSESSMENT: ACTIVITIES ARE NOT PREVENTED

## 2022-09-15 ASSESSMENT — PAIN DESCRIPTION - LOCATION
LOCATION: LEG
LOCATION: LEG

## 2022-09-15 ASSESSMENT — PAIN SCALES - GENERAL
PAINLEVEL_OUTOF10: 5
PAINLEVEL_OUTOF10: 3

## 2022-09-15 NOTE — PROGRESS NOTES
Called [facility] for [place] J collar for safety at discharge per Neuro sx recommendations. [facility] unaware of the order. Order placed.  Electronically signed by Katie Webb RN on 9/15/2022 at 11:33 AM

## 2022-09-15 NOTE — PROGRESS NOTES
[facility] Medicine Progress Note     Date:  9/15/2022    PCP: Fantasma Way MD (Tel: [phone])    Date of Admission: 9/12/2022    Chief complaint:   Chief Complaint   Patient presents with    Fall    Dizziness     Pt presents to ED with complaints of frequent falls. Pt reports she has  has about 5 fall this  month alone, she reports this has been on going since her PCP has been titration her meds. Pt reports feeling like the room is spinning, resulting in the falls, pt reports she was on the floor for about two hours as she could not get off the floor. Denies hitting head or LOC,  pt not anticoagulated . Brief admission history:- 82-year-old female with history of essential hypertension, hyperlipidemia, osteoarthritis, who presents to the emergency room with complaints of recurrent falls. Patient was accompanied by her . They both reported that patient has been having multiple falls, at least once daily over the last 3 days prior to arrival in the emergency room. Patient describes dizziness, occasional room spinning sensation, leading to the fall; she has not lost consciousness. In the emergency room, she was noted to have significantly elevated blood pressure and patient states that she did not take her blood pressure medications for about 3 days (due to difficulty getting to pharmacy to  prescription). CT-head was unremarkable for acute pathology. CTA-head/neck reveals moderate to severe focal stenosis in the left PCA proximal P2 segment, mild (less than 50%) stenosis of the bilateral proximal ICAs, multilevel cervical spondylosis most notable at C5-C6 where there is moderate (possibly severe) spinal canal stenosis; no evidence of large vessel occlusion in the major arteries of the head and neck. Assessment/plan:  Hypertensive urgency. Likely secondary to medication noncompliance. Blood pressure improved with list to have prescribed home antihypertensive medications.   As of 9/14/2022, patient placed on home dose of antihypertensive medications. Monitor blood pressure overnight. As needed IV hydralazine for systolic blood pressure greater than 150 mmHg. ECHO with grade 1 diastolic dysfunction. BP currently elevated because she did not get one of her home antihypertensive medications/nifedipine on 9/14 - despite not within parameter for hold. Continue home meds and monitor trend today. Orthostatic dizziness, much improved. Likely secondary to decreased oral intake. Orthostatic vitals positive on 9/14/2022. Compression stockings ordered. She was on intravenous fluid which has now been discontinued. Education on acclimatization techniques. Discussed with patient and recommended need to ambulate with a walker, especially with findings concerning for cervical myelopathy on MRI. Abnormal MRI-cervical spine concerning for cervical myelopathy. MRI-cervical spine reveals severe degenerative changes notably at C5-C6 with findings worrisome for early cervical myelopathy, moderate to severe left foraminal narrowing due to uncovertebral spurring at C6-C7. Patient has been evaluated by neurosurgeon, recommends J collar, ambulation with walker. Also discussed with patient to avoid falling, due to risk of paralysis. Patient will follow-up with neurosurgeon as outpatient. Dizziness, vertigo and recurrent falls without loss of consciousness. Likely secondary to #2 as noted above. Much improved. Left calf pain. Check venous ultrasound to rule out DVT. Tylenol helping. Other comorbidities: history of essential hypertension, hyperlipidemia, osteoarthritis, hearing difficulties. Disposition. Possible discharge to SNF tomorrow - provided blood pressure much improved. Venous ultrasound of left lower ext pending. Diet: ADULT DIET; Regular    Code status: Full Code   ----------  Subjective  Reports left calf pain. No dizziness.     Objective  Physical exam:  Vitals: BP (!) 180/73 Comment: chata  Pulse 77   Temp 99.4 °F (37.4 °C) (Oral)   Resp 14   Ht 5' 3\" (1.6 m)   Wt 166 lb 3.6 oz (75.4 kg)   SpO2 96%   BMI 29.45 kg/m²   Gen/overall appearance: Not in acute distress. Alert. Oriented x3. Head: Normocephalic, atraumatic  Eyes: EOMI, good acuity  ENT: Oral mucosa moist  Neck: No JVD, thyromegaly  CVS: Nml S1S2, no MRG, RRR  Pulm: Clear bilaterally. No crackles/wheezes  Gastrointestinal: Soft, NT/ND, +BS  Musculoskeletal: No edema. Warm  Neuro: No focal deficit. Moves extremity spontaneously. Psychiatry: Appropriate affect. Not agitated. Skin: Warm, dry with normal turgor. No rash  Capillary refill: Brisk,< 3 seconds   Peripheral Pulses: +2 palpable, equal bilaterally     24HR INTAKE/OUTPUT:    Intake/Output Summary (Last 24 hours) at 9/15/2022 0810  Last data filed at 9/15/2022 0629  Gross per 24 hour   Intake 790 ml   Output 700 ml   Net 90 ml       I/O last 3 completed shifts: In: 940 [P.O.:930; I.V.:10]  Out: 1500 [Urine:1500]  No intake/output data recorded.   Meds:    NIFEdipine  30 mg Oral Daily    hydrALAZINE  25 mg Oral TID    aspirin  81 mg Oral Daily    carvedilol  25 mg Oral BID WC    losartan  100 mg Oral Daily    sertraline  100 mg Oral Daily    meclizine  12.5 mg Oral 4 times per day    busPIRone  5 mg Oral BID    sodium chloride flush  5-40 mL IntraVENous 2 times per day    enoxaparin  40 mg SubCUTAneous Daily     Infusions:    sodium chloride       PRN Meds: sodium chloride flush, sodium chloride, ondansetron **OR** ondansetron, polyethylene glycol, acetaminophen **OR** acetaminophen, potassium chloride **OR** potassium alternative oral replacement **OR** potassium chloride, magnesium sulfate    Labs/imaging:  CBC:   Recent Labs     09/13/22  0826   WBC 2.6*   HGB 12.8          BMP:    Recent Labs     09/13/22  0431 09/15/22  0441    133*   K 3.3* 3.9    101   CO2 23 23   BUN 13 14   CREATININE 0.5* 0.5*   GLUCOSE 88 96       Hepatic:   Recent Labs     09/13/22  0431   AST 16   ALT 12   BILITOT 1.1*   ALKPHOS 62         Brayden Whalen MD  -------------------------------  [facility]

## 2022-09-15 NOTE — PROGRESS NOTES
Blood pressure elevated today. Home dose of procardia that was restarted yesterday noted administered per chart review.

## 2022-09-15 NOTE — CARE COORDINATION
Patient's covid test came back positive. Unable to reach patient in hospital room. Spoke to  Jhonathan Ingram over the phone. Explained since patient is covid positive, Home at [place] [facility] [address] [facility] will not be able to accept.  tells me he is covid (+) now as well. Provided covid accepting facility options,  would like a referral to [facility] [address] [address]. Referral made. Electronically signed by Prem Neely RN Case Management [phone] on 9/15/2022 at 9:38 AM    [facility] [address] [address] is able to accept. Will submit for pre cert when receive updated therapy notes.   Electronically signed by Prem Neely RN on 9/15/2022 at 10:12 AM

## 2022-09-15 NOTE — PROGRESS NOTES
Occupational Therapy  Facility/Department: [address] PROGRESSIVE CARE  Occupational Therapy Daily Treatment Note  Name: Suzy Bhagat  : 1940  MRN: [id number]  Date of Service: 9/15/2022    Discharge Recommendations:  3-5 sessions per week, Patient would benefit from continued therapy after discharge  OT Equipment Recommendations  Other: TBD     Suzy Bhagat scored a 14/24 on the AM-PAC ADL Inpatient form. Current research shows that an AM-PAC score of 17 or less is typically not associated with a discharge to the patient's home setting. Based on the patient's AM-PAC score and their current ADL deficits, it is recommended that the patient have 3-5 sessions per week of Occupational Therapy at d/c to increase the patient's independence. Please see assessment section for further patient specific details. If patient discharges prior to next session this note will serve as a discharge summary. Please see below for the latest assessment towards goals. Patient Diagnosis(es): The primary encounter diagnosis was Dizziness. Diagnoses of Frequent falls, Elevated blood pressure reading with diagnosis of hypertension, and Abnormal computed tomography angiography of head were also pertinent to this visit. Past Medical History:  has a past medical history of Arthritis, Cataract, Chronic diastolic heart failure ([address] High blood pressure, Hyperlipidemia, and stress test.  Past Surgical History:  has a past surgical history that includes joint replacement and eye surgery (). Treatment Diagnosis: decreased ADL/IADL, balance      Assessment   Performance deficits / Impairments: Decreased functional mobility ; Decreased safe awareness;Decreased balance;Decreased cognition;Decreased ADL status; Decreased endurance  Assessment: P tis an [de-identified] yr old female admitted with hypertensive urgency, has had multiple falls at home, dizziness.   PTA she lives with her spouse who assists as needed, but she was indep with ADL, jewel ( history of falls). Pt is currrently functioning below baseline in above areas, anticipate she will require mod/max assist for LB ADL due to decreased balance/strength. Pt required min/mod A x 2 to stand to miki stedy and transfer in/out of bathroom to void. Pt required assist to manage clothes up/down and hygiene while pt stood with stedy for balance. Pt is functioning well below baseline and benefit from skilled therapy prior to returning home. Prognosis: Good  REQUIRES OT FOLLOW-UP: Yes  Activity Tolerance  Activity Tolerance: Patient limited by fatigue  Activity Tolerance Comments: denied dizziness        Plan   Plan  Times per Week: 3-5  Times per Day: Daily  Current Treatment Recommendations: Balance training, Functional mobility training, Endurance training, Safety education & training, Patient/Caregiver education & training, Self-Care / ADL     Restrictions  Restrictions/Precautions  Restrictions/Precautions: Contact Precautions  Position Activity Restriction  Other position/activity restrictions: per Lucia Frank's note: Recommend walker and miami J collar for safety if discharged (due to cervical stenosis). ISO:   COVID +    Subjective   General  Chart Reviewed: Yes  Patient assessed for rehabilitation services?: Yes  Additional Pertinent Hx: Ramu Portillo is a [de-identified] y.o. female who presents to the emergency department after a fall. The patient reports that she has had 5 falls in the last month. First she told me that her knees just give out on her then she tells me that she actually gets pretty dizzy and this is what is causing the fall. She is somewhat of a poor historian and her history is somewhat unreliable. She describes the dizziness as a room spinning sensation. Nothing seemed to bring it on nothing makes it better or worse. She denies any pain or injury from the fall. She says she spent about 2 hours on the ground trying to get up but she was too weak to get up.   She has had normal appetite. Denies any recent illness. She is being worked up for GAM[facility]IL. She also says she has had some recent blood pressure medication changes but she cannot further elaborate. (copied per note Timothy MALONE 9/12/22)  Family / Caregiver Present: No  Referring Practitioner: Dr. Kim Gomez  Diagnosis: Hypertensive urgency  Subjective  Subjective: Pt seen bedside and agreeable to therapy. Pt pleasantly confused and appears [place]  General Comment  Comments: Per RN ok for therapy     Social/Functional History  Social/Functional History  Lives With: Spouse  Type of Home: House  Home Layout: One level  Home Access: Stairs to enter with rails  Entrance Stairs - Number of Steps: 3  Bathroom Shower/Tub: Walk-in shower  Bathroom Toilet: Standard  Bathroom Equipment: Shower chair  Home Equipment: [employer], rolling  Has the patient had two or more falls in the past year or any fall with injury in the past year?: Yes (hx of at least 5 recent falls - sound like syncopal episodes)  Homemaking Assistance:  ( performs)  Ambulation Assistance: Independent  Transfer Assistance: Independent  Active : No  Leisure & Hobbies: play on  computer       Objective     Safety Devices  Type of Devices: Left in bed;Bed alarm in place;Gait belt;Patient at risk for falls;Call light within reach     Toilet Transfers  Toilet Transfers Comments: Use of stedy in/out of bathroom; min/mod A x 2 to stand toilet > stedy     ADL  Toileting: Dependent/Total (min/mod A x 2 to stand from toilet to stedy and total A to cleanse pt and manage depends up/down)  Additional Comments: Anticipate pt will require up to max A for LB bathing/dressing, SBA for UB bathing/dressing and grooming when seated based on balance and endurance observed     Activity Tolerance  Activity Tolerance: Patient tolerated treatment well    Bed mobility  Supine to Sit: Minimal assistance; Moderate assistance;2 Person assistance  Sit to Supine: Minimal assistance; Moderate assistance;2 Person assistance    Transfers  Sit to stand: Minimal assistance; Moderate assistance;2 Person assistance  Stand to sit: Moderate assistance;Minimal assistance;2 Person assistance  Transfer Comments: Pt stood from bed > STEDY with min/mod A x 2. Use of stedy in/out of bathroom    Vision  Vision: Impaired  Vision Exceptions: Wears glasses for distance (, glasses for distance, had cataract surgery with lens implant for reading)  Hearing  Hearing: Within functional limits    Cognition  Overall Cognitive Status: Exceptions  Memory: Decreased short term memory  Safety Judgement: Decreased awareness of need for assistance  Problem Solving: Assistance required to generate solutions;Assistance required to implement solutions  Insights: Decreased awareness of deficits  Initiation: Requires cues for some  Sequencing: Requires cues for some  Cognition Comment: pt following directions well, but not fully aware of her surroundings, min confused  Orientation  Orientation Level: Oriented to place;Oriented to time;Oriented to person;Disoriented to situation (not fully oriented to situation)                  Education Given To: Patient  Education Provided: Role of Therapy;Plan of Care;Precautions;Transfer Training  Education Method: Verbal;Demonstration; Teach Back  Barriers to Learning: Cognition  Education Outcome: Verbalized understanding;Continued education needed      AM-PAC Score  AM-[facility] Inpatient Daily Activity Raw Score: 14 (09/15/22 1433)  AM-PAC Inpatient ADL T-Scale Score : 33.39 (09/15/22 1433)  ADL Inpatient CMS 0-100% Score: 59.67 (09/15/22 1433)  ADL Inpatient CMS G-Code Modifier : CK (09/15/22 1433)    Goals  Short Term Goals  Time Frame for Short term goals: by discharge : goals ongoing  Short Term Goal 1: self care with min assist LB  Short Term Goal 2: toilet with CGA  Short Term Goal 3: transfer with SBA  Short Term Goal 4: functional mobilitly with SBA and LRAD  Short Term Goal 5: increase activity tolerance to complete above tasks  Long Term Goals  Time Frame for Long term goals : same as stg  Patient Goals   Patient goals : \"I want to be able to find out what's wrong\"       Therapy Time   Individual Concurrent Group Co-treatment   Time In 1300         Time Out 1330         Minutes 30         Timed Code Treatment Minutes: 30 Minutes     This note to serve as OT d/c summary if pt is d/c-ed prior to next therapy session.     Isabel Akers, OTR/L

## 2022-09-15 NOTE — CARE COORDINATION
[address] [facility] ACCESS PORTAL    REQUESTED SETTING:     Name: [address]  Address:  [address]   Phone:  [phone]  Fax:  [phone]    ANTICIPATED ADMIT DATE TO SNF:  9/16/2022    [facility] [place] #:  [id number]    FYI: patient's first name in [place] [address] portal is \"Mariella\"    Electronically signed by Essence Mendiola RN Case Management on 9/15/2022 at 4:11 PM      [employer] Authorization Received via [facility] Portal:    Plan Auth ID:    Sd Reyes ID:  [id number]  Service:  [facility] Dates:  09/16/2022-09/20/2022  Next Review Date:  9/20/2022    Electronically signed by Essence Mendiola RN on 9/15/2022 at 4:28 PM

## 2022-09-15 NOTE — PROGRESS NOTES
Physical Therapy  Facility/Department: [facility] 1E PROGRESSIVE CARE  Physical Therapy Treatment Note    Name: Ramiro Mancera  : 1940  MRN: [id number]  Date of Service: 9/15/2022    Discharge Recommendations:  Patient would benefit from continued therapy after discharge, [address] (3-5x/wk)   PT Equipment Recommendations  Other: defer to next level of care    Ramiro Mancera scored a  on the AM-PAC short mobility form. Current research shows that an AM-PAC score of 17 or less is typically not associated with a discharge to the patient's home setting. Based on the patient's AM-PAC score and their current functional mobility deficits, it is recommended that the patient have 3-5 sessions per week of Physical Therapy at d/c to increase the patient's independence. Please see assessment section for further patient specific details. If patient discharges prior to next session this note will serve as a discharge summary. Please see below for the latest assessment towards goals. Patient Diagnosis(es): The primary encounter diagnosis was Dizziness. Diagnoses of Frequent falls, Elevated blood pressure reading with diagnosis of hypertension, and Abnormal computed tomography angiography of head were also pertinent to this visit. Past Medical History:  has a past medical history of Arthritis, Cataract, Chronic diastolic heart failure ([address] High blood pressure, Hyperlipidemia, and stress test.  Past Surgical History:  has a past surgical history that includes joint replacement and eye surgery (). Assessment   Body Structures, Functions, Activity Limitations Requiring Skilled Therapeutic Intervention: Decreased functional mobility ; Decreased safe awareness;Decreased strength;Decreased balance;Decreased posture;Decreased endurance;Decreased cognition  Assessment: Pt is an [de-identified] y.o. F. admitted  for dizziness, falls.   She denies any dizziness this session but overall has early fatigue and weakness; she had a miami J collar ordered by nursing as neurosurgery recommended pt has a miami J collar and walker for getting up and amb for protection due to frequent falls and cervical stenosis; Pt used the miki stedy lift this date due to weakness r/t COVID infection; she is not able to safely amb at this time. Pt will benefit from continued therapy 3-5x/wk to address deficits to allow pt to regain her Ind and return home  Therapy Prognosis: Fair;Good  Decision Making: Medium Complexity  Exam: Strength; ROM; Balance; Ambulation  Barriers to Learning: Confusion; Weakness  Requires PT Follow-Up: Yes  Activity Tolerance  Activity Tolerance: Patient tolerated treatment well     Plan   Plan  Plan: 3-5 times per week  Specific Instructions for Next Treatment: improve balance, strength, endurance  Current Treatment Recommendations: Strengthening, Balance training, Gait training, ROM, Functional mobility training, Stair training, Neuromuscular re-education, Transfer training, Equipment evaluation, education, & procurement, Patient/Caregiver education & training, ADL/Self-care training, Endurance training, Safety education & training  Safety Devices  Type of Devices: Left in bed, Bed alarm in place, Gait belt, Patient at risk for falls, Call light within reach  Restraints  Restraints Initially in Place: No     Restrictions  Restrictions/Precautions  Restrictions/Precautions: Contact Precautions  Position Activity Restriction  Other position/activity restrictions: per Lucia Frank's note: Recommend walker and miami J collar for safety if discharged (due to cervical stenosis). ISO:   COVID +     Subjective   General  Chart Reviewed: Yes  Patient assessed for rehabilitation services?: Yes  Additional Pertinent Hx: Lupe Flores presented with multiple falls, dizziness. Over the last month patient has had 5 falls and has been having difficulty standing up.   Patient feels like intermittently the room is spinning which causes her falls she was on the floor 2 hours ago and could not get herself up. Denies hitting her head no LOC no neck pain no numbness or weakness. No vision changes. Dizziness is occasionally present at rest.  Again has been going on and constant for the last month. Response To Previous Treatment: Patient with no complaints from previous session. Family / Caregiver Present: No  Referring Practitioner: Dr. Dalton Jennings  Referral Date : 09/12/22  Subjective  Subjective: Pt pleasantly confused.  Reports she needs to go to the bathroom         Social/Functional History  Social/Functional History  Lives With: Spouse  Type of Home: House  Home Layout: One level  Home Access: Stairs to enter with rails  Entrance Stairs - Number of Steps: 3  Bathroom Shower/Tub: Walk-in shower  Bathroom Toilet: Standard  Bathroom Equipment: Shower chair  Home Equipment: [employer], rolling  Has the patient had two or more falls in the past year or any fall with injury in the past year?: Yes (hx of at least 5 recent falls - sound like syncopal episodes)  Homemaking Assistance:  ( performs)  Ambulation Assistance: Independent  Transfer Assistance: Independent  Active : No  Leisure & Hobbies: play on  computer  [address]: Impaired  Vision Exceptions: Wears glasses for distance (, glasses for distance, had cataract surgery with lens implant for reading)  Hearing  Hearing: Within functional limits    Cognition   Orientation  Orientation Level: Oriented to place;Oriented to time;Oriented to person;Disoriented to situation (not fully oriented to situation)  Cognition  Overall Cognitive Status: Exceptions  Memory: Decreased short term memory  Safety Judgement: Decreased awareness of need for assistance  Problem Solving: Assistance required to generate solutions;Assistance required to implement solutions  Insights: Decreased awareness of deficits  Initiation: Requires cues for some  Sequencing: Requires cues for some  Cognition Comment: pt following directions well, but not fully aware of her surroundings, min confused     Objective   Heart Rate: 72  Heart Rate Source: Monitor  BP: (!) 115/51  BP Location: Right upper arm  MAP (Calculated): 72.33  Resp: 15  SpO2: 96 %  O2 Device: None (Room air)                             Bed mobility  Supine to Sit: Minimal assistance; Moderate assistance;2 Person assistance  Sit to Supine: Minimal assistance; Moderate assistance;2 Person assistance  Transfers  Sit to Stand: Minimal Assistance; Moderate Assistance;2 Person Assistance (from EOB and from commode to stedy)  Stand to sit: Minimal Assistance; Moderate Assistance;2 Person Assistance (from stedy to commode and to EOB)  Bed to Chair: Dependent/Total (with stedy)  Comment: pt needed assist for cleansing and to manage adult brief; pt declined sitting up in chair as she was up all morning in chair and was fatigued; positioned with all needs in reach        Balance  Comments: SBA for sitting on commode and at EOB; pt denies any dizziness this date           OutComes Score                                                  AM-PAC Score  AM-PAC Inpatient Mobility Raw Score : 11 (09/15/22 1434)  AM-PAC Inpatient T-Scale Score : 33.86 (09/15/22 1434)  Mobility Inpatient CMS 0-100% Score: 72.57 (09/15/22 1434)  Mobility Inpatient CMS G-Code Modifier : CL (09/15/22 1434)          [place] Score       Goals  Short Term Goals  Time Frame for Short term goals: 2-3 days  Short term goal 1: Bed mobility SBA  Short term goal 2: Transfers CGA  Short term goal 3: Ambulation 8' with walker, Min A  Short term goal 4: Ascend/Descend 4 steps with Min A  Patient Goals   Patient goals :  To get stronger       Education  Patient Education  Education Given To: Patient  Education Provided Comments: reviewed  call light and not getting up without assist  Education Method: Verbal  Education Outcome: Verbalized understanding      Therapy Time   Individual Concurrent Group Co-treatment   Time In 1300         Time Out 1330         Minutes 30                 CHANTELL RILEY, PT   Electronically signed by CHANTELL RILEY PT on 9/15/2022 at 2:35 PM

## 2022-09-16 VITALS
OXYGEN SATURATION: 96 % | BODY MASS INDEX: 30.62 KG/M2 | DIASTOLIC BLOOD PRESSURE: 49 MMHG | HEIGHT: 63 IN | RESPIRATION RATE: 15 BRPM | SYSTOLIC BLOOD PRESSURE: 115 MMHG | HEART RATE: 75 BPM | TEMPERATURE: 98.8 F | WEIGHT: 172.84 LBS

## 2022-09-16 PROCEDURE — 6360000002 HC RX W HCPCS: Performed by: INTERNAL MEDICINE

## 2022-09-16 PROCEDURE — 94760 N-INVAS EAR/PLS OXIMETRY 1: CPT

## 2022-09-16 PROCEDURE — 2580000003 HC RX 258: Performed by: INTERNAL MEDICINE

## 2022-09-16 PROCEDURE — 6370000000 HC RX 637 (ALT 250 FOR IP): Performed by: INTERNAL MEDICINE

## 2022-09-16 RX ORDER — HYDRALAZINE HYDROCHLORIDE 25 MG/1
25 TABLET, FILM COATED ORAL 3 TIMES DAILY
Qty: 90 TABLET | Refills: 3
Start: 2022-09-16 | End: 2022-09-16 | Stop reason: HOSPADM

## 2022-09-16 RX ADMIN — ENOXAPARIN SODIUM 40 MG: 100 INJECTION SUBCUTANEOUS at 09:18

## 2022-09-16 RX ADMIN — BUSPIRONE HYDROCHLORIDE 5 MG: 5 TABLET ORAL at 09:17

## 2022-09-16 RX ADMIN — SERTRALINE 100 MG: 100 TABLET, FILM COATED ORAL at 09:18

## 2022-09-16 RX ADMIN — LOSARTAN POTASSIUM 100 MG: 25 TABLET, FILM COATED ORAL at 09:17

## 2022-09-16 RX ADMIN — ASPIRIN 81 MG 81 MG: 81 TABLET ORAL at 09:18

## 2022-09-16 RX ADMIN — Medication 10 ML: at 09:20

## 2022-09-16 RX ADMIN — NIFEDIPINE 30 MG: 30 TABLET, EXTENDED RELEASE ORAL at 09:18

## 2022-09-16 RX ADMIN — CARVEDILOL 25 MG: 6.25 TABLET, FILM COATED ORAL at 09:18

## 2022-09-16 NOTE — PROGRESS NOTES
[facility] Medicine Progress Note     Date:  9/16/2022    PCP: Eliu Liu MD (Tel: [phone])    Date of Admission: 9/12/2022    Chief complaint:   Chief Complaint   Patient presents with    Fall    Dizziness     Pt presents to ED with complaints of frequent falls. Pt reports she has  has about 5 fall this  month alone, she reports this has been on going since her PCP has been titration her meds. Pt reports feeling like the room is spinning, resulting in the falls, pt reports she was on the floor for about two hours as she could not get off the floor. Denies hitting head or LOC,  pt not anticoagulated . Brief admission history:- 77-year-old female with history of essential hypertension, hyperlipidemia, osteoarthritis, who presents to the emergency room with complaints of recurrent falls. Patient was accompanied by her . They both reported that patient has been having multiple falls, at least once daily over the last 3 days prior to arrival in the emergency room. Patient describes dizziness, occasional room spinning sensation, leading to the fall; she has not lost consciousness. In the emergency room, she was noted to have significantly elevated blood pressure and patient states that she did not take her blood pressure medications for about 3 days (due to difficulty getting to pharmacy to  prescription). CT-head was unremarkable for acute pathology. CTA-head/neck reveals moderate to severe focal stenosis in the left PCA proximal P2 segment, mild (less than 50%) stenosis of the bilateral proximal ICAs, multilevel cervical spondylosis most notable at C5-C6 where there is moderate (possibly severe) spinal canal stenosis; no evidence of large vessel occlusion in the major arteries of the head and neck. Assessment/plan:  Hypertensive urgency. Likely secondary to medication noncompliance. Blood pressure improved with list to have prescribed home antihypertensive medications.   As of 9/14/2022, patient placed on home dose of antihypertensive medications and blood pressure is now with good control. She has been counseled about importance of compliance with medication use. Orthostatic dizziness, much improved. Likely secondary to decreased oral intake. Orthostatic vitals positive on 9/14/2022. Compression stockings ordered. She was on intravenous fluid which has now been discontinued. Education on acclimatization techniques discussed with patient. .  Discussed with patient and recommended need to ambulate with a walker, especially with findings concerning for cervical myelopathy on MRI. Asymptomatic COVID+ with no evidence of COVID pneumonia or hypoxia. No indication for COVID-specific therapy. Abnormal MRI-cervical spine concerning for cervical myelopathy. MRI-cervical spine reveals severe degenerative changes notably at C5-C6 with findings worrisome for early cervical myelopathy, moderate to severe left foraminal narrowing due to uncovertebral spurring at C6-C7. Patient has been evaluated by neurosurgeon, recommends J collar, ambulation with walker. Also discussed with patient to avoid falling, due to risk of paralysis. Patient will follow-up with neurosurgeon as outpatient. Dizziness, vertigo and recurrent falls without loss of consciousness. Likely secondary to #2 as noted above. Much improved. Left calf pain, resolved. Venous ultrasound negative for DVT. As needed tylenol.,  Other comorbidities: history of essential hypertension, hyperlipidemia, osteoarthritis, hearing difficulties. Disposition. Stable for discharge with recommendation to follow up with neurosurgeon as outpatient. Diet: ADULT DIET; Regular    Code status: Full Code   ----------  Subjective  No complaints.     Objective  Physical exam:  Vitals: BP (!) 130/57   Pulse 75   Temp 98.9 °F (37.2 °C) (Oral)   Resp 17   Ht 5' 3\" (1.6 m)   Wt 172 lb 13.5 oz (78.4 kg)   SpO2 97%   BMI 30.62 kg/m² Gen/overall appearance: Not in acute distress. Alert. Oriented x3. Head: Normocephalic, atraumatic  Eyes: EOMI, good acuity  ENT: Oral mucosa moist  Neck: No JVD, thyromegaly  CVS: Nml S1S2, no MRG, RRR  Pulm: Clear bilaterally. No crackles/wheezes  Gastrointestinal: Soft, NT/ND, +BS  Musculoskeletal: No edema. Warm  Neuro: No focal deficit. Moves extremity spontaneously. Psychiatry: Appropriate affect. Not agitated. Skin: Warm, dry with normal turgor. No rash  Capillary refill: Brisk,< 3 seconds   Peripheral Pulses: +2 palpable, equal bilaterally     24HR INTAKE/OUTPUT:    Intake/Output Summary (Last 24 hours) at 9/16/2022 0913  Last data filed at 9/16/2022 9482  Gross per 24 hour   Intake 600 ml   Output 700 ml   Net -100 ml       I/O last 3 completed shifts: In: 900 [P.O.:900]  Out: 1200 [Urine:1200]  No intake/output data recorded. Meds:    NIFEdipine  30 mg Oral Daily    aspirin  81 mg Oral Daily    carvedilol  25 mg Oral BID WC    losartan  100 mg Oral Daily    sertraline  100 mg Oral Daily    busPIRone  5 mg Oral BID    sodium chloride flush  5-40 mL IntraVENous 2 times per day    enoxaparin  40 mg SubCUTAneous Daily     Infusions:    sodium chloride       PRN Meds: sodium chloride flush, sodium chloride, ondansetron **OR** ondansetron, polyethylene glycol, acetaminophen **OR** acetaminophen, potassium chloride **OR** potassium alternative oral replacement **OR** potassium chloride, magnesium sulfate    Labs/imaging:  CBC:   No results for input(s): WBC, HGB, PLT in the last 72 hours. BMP:    Recent Labs     09/15/22  0441   *   K 3.9      CO2 23   BUN 14   CREATININE 0.5*   GLUCOSE 96       Hepatic:   No results for input(s): AST, ALT, ALB, BILITOT, ALKPHOS in the last 72 hours.       Agustin Barboza MD  -------------------------------  Rounding hospitalist

## 2022-09-16 NOTE — DISCHARGE SUMMARY
Hospital Discharge Summary    Patient's PCP: Britney Esteevz MD  Admit Date: 9/12/2022   Discharge Date: 9/16/2022    Admitting Physician: Dr. Kimmie Mcdowell MD  Discharge Physician: Dr. Kimmie Mcdowell MD     Consults:   IP CONSULT TO HOSPITALIST  IP CONSULT TO NEUROSURGERY    Brief HPI: Patient admitted with dizziness, recurrent falls, hypertensive urgency    Brief hospital course: 21-year-old female with history of essential hypertension, hyperlipidemia, osteoarthritis, who presents to the emergency room with complaints of recurrent falls. Patient was accompanied by her . They both reported that patient has been having multiple falls, at least once daily over the last 3 days prior to arrival in the emergency room. Patient describes dizziness, occasional room spinning sensation, leading to the fall; she has not lost consciousness. In the emergency room, she was noted to have significantly elevated blood pressure and patient states that she did not take her blood pressure medications for about 3 days (due to difficulty getting to pharmacy to  prescription). CT-head was unremarkable for acute pathology. CTA-head/neck reveals moderate to severe focal stenosis in the left PCA proximal P2 segment, mild (less than 50%) stenosis of the bilateral proximal ICAs, multilevel cervical spondylosis most notable at C5-C6 where there is moderate (possibly severe) spinal canal stenosis; no evidence of large vessel occlusion in the major arteries of the head and neck. Assessment/plan:  Hypertensive urgency. Likely secondary to medication noncompliance. Blood pressure improved with list to have prescribed home antihypertensive medications. As of 9/14/2022, patient placed on home dose of antihypertensive medications and blood pressure is now with good control. She has been counseled about importance of compliance with medication use. Orthostatic dizziness, much improved.   Likely secondary to decreased oral intake. Orthostatic vitals positive on 9/14/2022. Compression stockings ordered. She was on intravenous fluid which has now been discontinued. Education on acclimatization techniques discussed with patient. .  Discussed with patient and recommended need to ambulate with a walker, especially with findings concerning for cervical myelopathy on MRI. Asymptomatic COVID+ with no evidence of COVID pneumonia or hypoxia. No indication for COVID-specific therapy. Abnormal MRI-cervical spine concerning for cervical myelopathy. MRI-cervical spine reveals severe degenerative changes notably at C5-C6 with findings worrisome for early cervical myelopathy, moderate to severe left foraminal narrowing due to uncovertebral spurring at C6-C7. Patient has been evaluated by neurosurgeon, recommends J collar, ambulation with walker. Also discussed with patient to avoid falling, due to risk of paralysis. Patient will follow-up with neurosurgeon as outpatient. Dizziness, vertigo and recurrent falls without loss of consciousness. Likely secondary to #2 as noted above. Much improved. Left calf pain, resolved. Venous ultrasound negative for DVT. As needed tylenol.,  Other comorbidities: history of essential hypertension, hyperlipidemia, osteoarthritis, hearing difficulties. Disposition. Stable for discharge with recommendation to follow up with neurosurgeon as outpatient. Invasive procedures:  None. Discharge Diagnoses:   See above. Physical Exam: BP (!) 130/57   Pulse 75   Temp 98.9 °F (37.2 °C) (Oral)   Resp 17   Ht 5' 3\" (1.6 m)   Wt 172 lb 13.5 oz (78.4 kg)   SpO2 97%   BMI 30.62 kg/m²   Gen/overall appearance: Not in acute distress. Alert. Oriented X3  Head: Normocephalic, atraumatic  Eyes: EOMI, good acuity  ENT: Oral mucosa moist  Neck: No JVD, thyromegaly  CVS: Nml S1S2, no MRG, RRR  Pulm: Clear bilaterally. No crackles/wheezes  Gastrointestinal: Soft, NT/ND, +BS  Musculoskeletal: No edema. Warm  Neuro: No focal deficit. Moves extremity spontaneously. Psychiatry: Appropriate affect. Not agitated. Skin: Warm, dry with normal turgor. No rash  Capillary refill: Brisk,< 3 seconds   Peripheral Pulses: +2 palpable, equal bilaterally     Significant diagnostic studies that may require follow up:  ECHO Complete 2D W Doppler W Color    Result Date: 9/14/2022  Transthoracic Echocardiography Report (TTE)  Demographics   Patient Name       Stephanie Beaulieu   Date of Study      09/14/2022         Gender              Female   Patient Number     [id number]         Date of Birth       1940   Visit Number       [id number]          Age                 [de-identified] year(s)   Accession Number   [id number]         Room Number         6015   IBUOFGFGW ID       [id number]             Sonographer         Silverio Irvin,                                                            [address]   Ordering Physician Dequan Kolb.                     MD                 Physician           Aguila Sosa MD  Procedure Type of Study   TTE procedure:ECHOCARDIOGRAM COMPLETE 2D W DOPPLER W COLOR. Procedure Date Date: 09/14/2022 Start: 03:05 PM Study Location: [facility] Technical Quality: Adequate visualization Indications:Hypertension. Additional Indications:HLD . Patient Status: Routine Height: 63 inches Weight: 166 pounds BSA: 1.79 m2 BMI: 29.41 kg/m2 Rhythm: Within normal limits HR: 77 bpm BP: 103/48 mmHg  Conclusions   Summary  Left ventricular cavity size is normal.  Normal left ventricular wall thickness. Ejection fraction is visually estimated to be 65-70%. No regional wall motion abnormalities are noted. Grade I diastolic dysfunction with elevated LV filling pressures. Normal right ventricular size and function. The left atrium is mildly dilated.   There are no significant valvular abnormalities appreciated in this study. Signature   ------------------------------------------------------------------  Electronically signed by Eliz Herrera MD  (Interpreting physician) on 09/14/2022 at 05:22 PM  ------------------------------------------------------------------   Findings   Left Ventricle  Left ventricular cavity size is normal.  Normal left ventricular wall thickness. Ejection fraction is visually estimated to be 65-70%. No regional wall motion abnormalities are noted. Grade I diastolic dysfunction with elevated LV filling pressures. Mitral Valve  Mitral annular calcification is present. Mild calcification of the posterior leaflet of the mitral valve. Trivial mitral regurgitation. No evidence of mitral stenosis. Left Atrium  The left atrium is mildly dilated. Aortic Valve  Aortic valve leaflets appear thickened. No evidence of aortic valve regurgitation. No evidence of aortic valve stenosis, velocities tiffanie to higher EF. Aorta  The aortic root is normal in size. The ascending aorta is normal in size. Right Ventricle  Normal right ventricular size and function. TAPSE= 2.2cm   Tricuspid Valve  The tricuspid valve was not well visualized. Trivial tricuspid regurgitation. No evidence of tricuspid stenosis. Right Atrium  The right atrium is normal in size. Pulmonic Valve  The pulmonic valve is not well visualized. No evidence of pulmonic valve regurgitation. No evidence of pulmonic valve stenosis. Pericardial Effusion  No pericardial effusion noted. Pleural Effusion  No pleural effusion. Miscellaneous  IVC size is normal (<2.1cm) and collapses > 50% with respiration consistent  with normal RA pressure (3mmHg). Unable to estimate pulmonary artery pressure secondary to incomplete TR jet  envelope.   M-Mode/2D Measurements (cm)   LV Diastolic Dimension: 0.97 cm LV Systolic Dimension: 4.24 cm  LV Septum Diastolic: 1.69 cm  LV PW Diastolic: 5.05 cm        AO Root Dimension: 2.72 cm  RV Diastolic Dimension: 9.93 cm                                  LA Area: 23 cm2  LVOT: 1.95 cm                   LA volume/Index: 73.4 ml /41 ml/m2  Doppler Measurements   AV Peak Velocity: 207 cm/s     MV Peak E-Wave: 103 cm/s  AV Peak Gradient: 17.14 mmHg   MV Peak A-Wave: 123 cm/s  AV Mean Gradient: 10 mmHg      MV E/A Ratio: 0.84  LVOT Peak Velocity: 117 cm/s   MV P1/2t: 63 msec  AV Area (Continuity):1.66 cm2  MV Mean Gradient: 3 mmHg                                 MV Max P mmHg                                 MV Vmax:125 cm/s                                 MV VTI:34.1 cm/s  E' Septal Velocity: 5.52 cm/s  E' Lateral Velocity: 7.45 cm/s MV Area (continuity): 2.03 cm2  PV Peak Velocity: 107 cm/s     MV Deceleration Time: 215 msec  PV Peak Gradient: 4.58 mmHg    MV Area (PHT): 3.49 cm2   Aortic Valve   Peak Velocity: 207 cm/s     Mean Velocity: 152 cm/s  Peak Gradient: 17.14 mmHg   Mean Gradient: 10 mmHg  Area (continuity): 1.66 cm2  AV VTI: 41.8 cm  Aorta   Aortic Root: 2.72 cm  Ascending Aorta: 3.15 cm  LVOT Diameter: 1.95 cm      CT HEAD WO CONTRAST    Result Date: 2022  EXAMINATION: CT OF THE HEAD WITHOUT CONTRAST  2022 6:46 am TECHNIQUE: CT of the head was performed without the administration of intravenous contrast. Automated exposure control, iterative reconstruction, and/or weight based adjustment of the mA/kV was utilized to reduce the radiation dose to as low as reasonably achievable. COMPARISON: 2021 HISTORY: ORDERING SYSTEM PROVIDED HISTORY: dizziness, frequent falls TECHNOLOGIST PROVIDED HISTORY: Reason for exam:->dizziness, frequent falls Has a \"code stroke\" or \"stroke alert\" been called? ->No Decision Support Exception - unselect if not a suspected or confirmed emergency medical condition->Emergency Medical Condition (MA) Reason for Exam: dizziness, frequent falls FINDINGS: BRAIN/VENTRICLES: There is no acute intracerebral hemorrhage or extra-axial fluid collection. There is mild cerebral atrophy with mild periventricular and deep white matter small vessel ischemic disease. ORBITS: Status post bilateral cataract removal and left retinal banding. SINUSES: Mild mucosal hypertrophy involves the bilateral ethmoid air cells. SOFT TISSUES/SKULL:  The calvarium is intact. 1. No acute intracranial abnormality. MRI CERVICAL SPINE WO CONTRAST    Result Date: 9/13/2022  EXAMINATION: MRI OF THE CERVICAL SPINE WITHOUT CONTRAST 9/12/2022 4:35 pm TECHNIQUE: Multiplanar multisequence MRI of the cervical spine was performed without the administration of intravenous contrast. COMPARISON: CT angiogram head neck 09/12/2022 HISTORY: ORDERING SYSTEM PROVIDED HISTORY: cervical stenosis TECHNOLOGIST PROVIDED HISTORY: Reason for exam:->cervical stenosis Reason for Exam: cervical stenosis FINDINGS: Motion degradation challenge evaluation. BONES/ALIGNMENT: Multilevel degenerate changes of the cervical spine. Anterolisthesis C3-C4, 1 to 2 mm and C4 on C5, 1 to 2 mm. Slight retrolisthesis C5 on C6, 2 to 3 mm. Slight reversal of the normal cervical lordosis notably at C5-C6 noted. SPINAL CORD: There is minimal patchy cord signal abnormality at C6, worrisome for myelopathy. SOFT TISSUES: No paraspinal mass identified. C2-C3: Mild disc space disease. Mild foraminal narrowing. Moderate facet arthropathy. No significant central canal stenosis. C3-C4: Mild disc space disease. Minor circumferential disc bulge and disc osteophyte complex. Uncovertebral spurring greatest on the left. Otherwise mild-to-moderate bilateral foraminal narrowing identified. There is moderate facet arthropathy. No central canal stenosis. C4-C5: Moderate disc space disease. Moderate uncovertebral spurring greatest on the right. Bilateral facet arthropathy. Mild ventral indentation of the thecal sac due to disc osteophyte complex formation. There is mild-to-moderate canal narrowing. Mild flattening of the cord.  C5-C6: Severe disc space disease. Mild diffuse disc osteophyte complex formation identified. Small central annular fissure or cyst identified involving the disc. These findings result in severe canal narrowing and mild cord flattening. Patchy signal abnormality within the cord at this level worrisome for early myelopathy. Otherwise, there is evidence of bilateral uncovertebral spurring mild-to-moderate on the right, mild on the left resulting in bilateral foraminal narrowing. Mild facet arthropathies. C6-C7: Severe disc space disease. Mild diffuse osteophyte complex mission identified. There is evidence of bilateral vertebral spurring greatest on the left. There is mild right-sided and moderate to severe left-sided foraminal narrowing. Mild canal narrowing. Mild facet arthropathy. C7-T1: Mild disc space disease. Moderate to severe degenerate facet arthropathy identified. Mild foraminal encroachment. Motion degradation challenged evaluation. Severe degenerate changes notably at C5-C6 with findings worrisome for early cervical myelopathy at this level. Moderate severe left foraminal narrowing due to uncovertebral spurring at C6-C7. VL Extremity Venous Left    Result Date: 9/15/2022  Lower Extremities DVT Study  Demographics   Patient Name       Shanta Domingo   Date of Study      09/15/2022         Gender              Female   Patient Number     [id number]         Date of Birth       1940   Visit Number       [id number]          Age                 [de-identified] year(s)   Accession Number   [id number]         Room Number         7771   Corporate ID       [id number]             Sabina Baer RVT                                                            CCT   Ordering Physician Zaid Clemens, Interpreting        Vida Mario MD                 Physician           MD  Procedure Type of Study:   Veins:Lower Extremities DVT Study, VL EXTREMITY VENOUS DUPLEX LEFT.    Vascular Sonographer Report  Indications for Study:Pain. Impressions Left Impression Technically difficult exam due to patient scanned in COVID 19 precautions. No evidence of deep vein or superficial vein thrombosis involving the left lower extremity and the right common femoral vein. Incidental finding on the left: ruptured baker cyst. Calf and ankle edema noted. Conclusions   Summary   No evidence of deep vein or superficial vein thrombosis involving the left  lower extremity. Signature   ------------------------------------------------------------------  Electronically signed by Gus Ruiz MD (Interpreting  physician) on 09/15/2022 at 02:38 PM  ------------------------------------------------------------------  Patient Status:Routine. Study [address] [facility]. Technical Quality:Adequate visualization. Risk Factors History +---------+----+--------+ ! Diagnosis! Date! Comments! +---------+----+--------+ ! TIA      !    !        ! +---------+----+--------+ Velocities are measured in cm/s ; Diameters are measured in mm Right Lower Extremities DVT Study Measurements Right 2D Measurements +--------------+----------+---------------+----------+ ! Location      ! Visualized! Compressibility! Thrombosis! +--------------+----------+---------------+----------+ ! Common Femoral!Yes       ! Yes            ! None      ! +--------------+----------+---------------+----------+ Right Doppler Measurements +--------------+------+------+------------+ ! Location      ! Signal!Reflux! Reflux (sec)! +--------------+------+------+------------+ ! Common Femoral!Phasic! No    !            ! +--------------+------+------+------------+ Left Lower Extremities DVT Study Measurements Left 2D Measurements +------------------------+----------+---------------+----------+ ! Location                ! Visualized! Compressibility! Thrombosis! +------------------------+----------+---------------+----------+ ! Sapheno Femoral Junction! Yes       ! Yes !None      ! +------------------------+----------+---------------+----------+ ! GSV Thigh               ! Yes       ! Yes            ! None      ! +------------------------+----------+---------------+----------+ ! Common Femoral          !Yes       ! Yes            ! None      ! +------------------------+----------+---------------+----------+ ! Prox Femoral            !Yes       ! Yes            ! None      ! +------------------------+----------+---------------+----------+ ! Mid Femoral             !Yes       ! Yes            ! None      ! +------------------------+----------+---------------+----------+ ! Dist Femoral            !Yes       ! Yes            ! None      ! +------------------------+----------+---------------+----------+ ! Deep Femoral            !Yes       ! Yes            ! None      ! +------------------------+----------+---------------+----------+ ! Popliteal               !Yes       ! Yes            ! None      ! +------------------------+----------+---------------+----------+ ! GSV Below Knee          ! Yes       ! Yes            ! None      ! +------------------------+----------+---------------+----------+ ! Gastroc                 ! Yes       ! Yes            ! None      ! +------------------------+----------+---------------+----------+ ! Soleal                  !Yes       ! Yes            ! None      ! +------------------------+----------+---------------+----------+ ! PTV                     ! Yes       ! Yes            ! None      ! +------------------------+----------+---------------+----------+ ! ATV                     ! Yes       ! Yes            ! None      ! +------------------------+----------+---------------+----------+ ! Peroneal                !Partial   !Yes            ! None      ! +------------------------+----------+---------------+----------+ ! GSV Calf                ! Yes       ! Yes            ! None      ! +------------------------+----------+---------------+----------+ ! SSV                     ! Yes       ! Yes !None      ! +------------------------+----------+---------------+----------+ Left Doppler Measurements +--------------+------+------+------------+ ! Location      ! Signal!Reflux! Reflux (sec)! +--------------+------+------+------------+ ! Common Femoral!Phasic! No    !            ! +--------------+------+------+------------+ ! Popliteal     !Phasic! No    !            ! +--------------+------+------+------------+    XR CHEST PORTABLE    Result Date: 9/12/2022  EXAMINATION: ONE XRAY VIEW OF THE CHEST 9/12/2022 7:17 am COMPARISON: None. HISTORY: ORDERING SYSTEM PROVIDED HISTORY: frequent falls TECHNOLOGIST PROVIDED HISTORY: Reason for exam:->frequent falls Reason for Exam: frequent falls FINDINGS: Both lungs are clear. The heart is normal size. There is no pulmonary vascular congestion or pleural abnormality. There is slight calcification of the aortic arch. No acute findings. CTA HEAD NECK W CONTRAST    Result Date: 9/12/2022  EXAMINATION: CTA OF THE HEAD AND NECK WITH CONTRAST 9/12/2022 7:35 am: TECHNIQUE: CTA of the head and neck was performed with the administration of intravenous contrast. Multiplanar reformatted images are provided for review. MIP images are provided for review. Stenosis of the internal carotid arteries measured using NASCET criteria. Automated exposure control, iterative reconstruction, and/or weight based adjustment of the mA/kV was utilized to reduce the radiation dose to as low as reasonably achievable. 3D reconstructed images were performed on a separate workstation and provided for review. COMPARISON: None. HISTORY: ORDERING SYSTEM PROVIDED HISTORY: dizziness x 1 month TECHNOLOGIST PROVIDED HISTORY: Reason for exam:->dizziness x 1 month Has a \"code stroke\" or \"stroke alert\" been called? ->No Decision Support Exception - unselect if not a suspected or confirmed emergency medical condition->Emergency Medical Condition (MA) Reason for Exam: dizziness x 1 month FINDINGS: CTA NECK: AORTIC ARCH/ARCH VESSELS: No dissection or arterial injury. No significant stenosis of the brachiocephalic or subclavian arteries. CAROTID ARTERIES: No dissection, arterial injury, or hemodynamically significant stenosis by NASCET criteria. Mild (less than 50%) stenosis of the proximal right internal carotid artery. Mild (less than 50%) stenosis of the proximal left internal carotid artery. VERTEBRAL ARTERIES: No dissection, arterial injury, or significant stenosis. SOFT TISSUES: The lung apices are clear. No cervical or superior mediastinal lymphadenopathy. The larynx and pharynx are unremarkable. No acute abnormality of the salivary and thyroid glands. BONES: No acute osseous abnormality. Straightening the cervical spine with reversal of curvature. Trace anterolisthesis of C3 on C4 and of C4 on C5. Multilevel cervical spondylosis, most notable at C5-C6 where there is moderate (possibly severe) spinal canal stenosis. CTA HEAD: ANTERIOR CIRCULATION: No significant stenosis of the intracranial internal carotid, anterior cerebral, or middle cerebral arteries. No aneurysm. POSTERIOR CIRCULATION: No significant stenosis of the vertebral, basilar, or right posterior cerebral arteries. Moderate/severe focal stenosis in the left posterior cerebral artery proximal P2 segment. Robust right posterior communicating artery with a hypoplastic right P1 segment, anatomic variant. Small left posterior communicating artery present, anatomic variant. No aneurysm. OTHER: No dural venous sinus thrombosis on this non-dedicated study. BRAIN: No mass effect or midline shift. No extra-axial fluid collection. The gray-white differentiation is maintained. 1. No evidence of large vessel occlusion in the major arteries of the head and neck. 2. 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 3. Mild (less than 50%) stenosis of the bilateral proximal ICAs.  4. Multilevel cervical spondylosis, most notable at C5-C6 where there is moderate (possibly severe) spinal canal stenosis. MRI BRAIN WO CONTRAST    Result Date: 9/12/2022  EXAMINATION: MRI OF THE BRAIN WITHOUT CONTRAST  9/12/2022 4:34 pm TECHNIQUE: Multiplanar multisequence MRI of the brain was performed without the administration of intravenous contrast. COMPARISON: CT head 09/12/2022 HISTORY: ORDERING SYSTEM PROVIDED HISTORY: dizziness TECHNOLOGIST PROVIDED HISTORY: Reason for exam:->dizziness Decision Support Exception - unselect if not a suspected or confirmed emergency medical condition->Emergency Medical Condition (MA) Reason for Exam: dizziness FINDINGS: INTRACRANIAL STRUCTURES/VENTRICLES: There is no acute infarct. Mild involutional changes. Moderate periventricular and subcortical T2 prolongation nonspecific but is most suggestive of chronic microvascular disease. No shift of midline structure. Basal cisterns are patent. Major flow voids are preserved. Remote left caudate and right mesial cerebellar lacunar strokes. ORBITS: Cataract surgery. Left scleral buckle. SINUSES: Mild ethmoid sinus mucosal thickening. BONES/SOFT TISSUES: The bone marrow signal intensity appears normal. The soft tissues demonstrate no acute abnormality. Moderate chronic microvascular disease. No acute stroke, midline shift or mass effect. Treatments: As above. Discharge Medications:     Medication List        CHANGE how you take these medications      busPIRone 5 MG tablet  Commonly known as: BUSPAR  TAKE 1 TABLET BY MOUTH TWICE A DAY  What changed: See the new instructions.             CONTINUE taking these medications      acetaminophen 325 MG tablet  Commonly known as: TYLENOL     aspirin 81 MG tablet     carvedilol 25 MG tablet  Commonly known as: COREG  TAKE 1 TABLET BY MOUTH  TWICE DAILY     losartan 100 MG tablet  Commonly known as: COZAAR  TAKE 1 TABLET BY MOUTH  DAILY     NIFEdipine 30 MG extended release tablet  Commonly known as: ADALAT CC sertraline 100 MG tablet  Commonly known as: ZOLOFT  TAKE 1 TABLET BY MOUTH EVERY DAY            STOP taking these medications      hydrALAZINE 50 MG tablet  Commonly known as: APRESOLINE               Where to Get Your Medications        These medications were sent to [address]  [facility], [address]      Phone: [phone]   busPIRone 5 MG tablet         Activity: activity as tolerated  Diet: ADULT DIET; Regular      Disposition: SNF  Discharged Condition: Stable  Follow Up:   Maira Juarez MD  [address]  [phone]    Schedule an appointment as soon as possible for a visit in 1 week(s)      Janis Cedeno MD  [address] [address] [facility] [id number] [place] [address]    Schedule an appointment as soon as possible for a visit in 1 week(s)      Janis Cedeno MD  [address] [place] [id number] [facility] [address] [place] [phone]          Code status:  Full Code     Total time spent on discharge, finalizing medications, referrals and arranging outpatient follow up was more than 30 minutes      Thank you Dr. Janis Cedeno MD for the opportunity to be involved in this patients care.

## 2022-09-16 NOTE — CARE COORDINATION
CASE MANAGEMENT DISCHARGE SUMMARY:  Spoke to patient &  Emma Cesar over the phone. Both agreeable to discharge to  [address] today. Spoke to Conrad Rios at facility, patient accepted, facility able to pull orders via Epic. RN aware of dc plan.     DISCHARGE DATE: 9/16/22    DISCHARGED TO:    Name: [place] [address] [address]  Address:  [address]    Report Number:  [phone]  Fax:  [phone]     TRANSPORTATION: [facility]             TIME: 2pm     INSURANCE PRECERT OBTAINED: yes    ROX/PASAAR COMPLETED: yes    Electronically signed by Ainsley De Jesus RN Case Management [phone] on 9/16/2022 at 9:26 AM

## 2022-09-16 NOTE — PROGRESS NOTES
Prepared patient for discharge. Called report to Myron Pena at [facility]. Removed IV. Gathered belongings. Waiting for transport.

## 2022-09-26 ENCOUNTER — TELEPHONE (OUTPATIENT)
Dept: INTERNAL MEDICINE CLINIC | Age: 82
End: 2022-09-26

## 2022-09-26 RX ORDER — SERTRALINE HYDROCHLORIDE 100 MG/1
TABLET, FILM COATED ORAL
Qty: 90 TABLET | Refills: 1 | Status: SHIPPED | OUTPATIENT
Start: 2022-09-26

## 2022-09-26 NOTE — TELEPHONE ENCOUNTER
Patient's daughter called in wanting to inform Dr. Adelaida Caraballo that the patient is in a facility for physical therapy and the patient's daughter would like for Dr. Adelaida Caraballo to be apart of the care for this. Patient is currently at [facility] on [address] [facility] in [place]. Patient's daughter is not on HIPAA form but would like for someone to call the facility. Phone number   [phone]          Pls call and advise.

## 2022-10-05 ENCOUNTER — OFFICE VISIT (OUTPATIENT)
Dept: ORTHOPEDIC SURGERY | Age: 82
End: 2022-10-05
Payer: MEDICARE

## 2022-10-05 VITALS — HEIGHT: 63 IN | WEIGHT: 172 LBS | BODY MASS INDEX: 30.48 KG/M2 | RESPIRATION RATE: 18 BRPM

## 2022-10-05 DIAGNOSIS — M17.12 PRIMARY OSTEOARTHRITIS OF LEFT KNEE: Primary | ICD-10-CM

## 2022-10-05 PROCEDURE — 99999 PR OFFICE/OUTPT VISIT,PROCEDURE ONLY: CPT | Performed by: PHYSICIAN ASSISTANT

## 2022-10-05 PROCEDURE — 20610 DRAIN/INJ JOINT/BURSA W/O US: CPT | Performed by: PHYSICIAN ASSISTANT

## 2022-10-05 RX ORDER — BUPIVACAINE HYDROCHLORIDE 2.5 MG/ML
2 INJECTION, SOLUTION INFILTRATION; PERINEURAL ONCE
Status: COMPLETED | OUTPATIENT
Start: 2022-10-05 | End: 2022-10-05

## 2022-10-05 RX ORDER — TRIAMCINOLONE ACETONIDE 40 MG/ML
40 INJECTION, SUSPENSION INTRA-ARTICULAR; INTRAMUSCULAR ONCE
Status: COMPLETED | OUTPATIENT
Start: 2022-10-05 | End: 2022-10-05

## 2022-10-05 RX ADMIN — BUPIVACAINE HYDROCHLORIDE 5 MG: 2.5 INJECTION, SOLUTION INFILTRATION; PERINEURAL at 09:40

## 2022-10-05 RX ADMIN — TRIAMCINOLONE ACETONIDE 40 MG: 40 INJECTION, SUSPENSION INTRA-ARTICULAR; INTRAMUSCULAR at 09:40

## 2022-10-05 NOTE — PROGRESS NOTES
Subjective:      Patient ID: Shadia Patrick is a [de-identified] y.o.  female who is here for evaluation and treatment of left knee pain related to arthritis. The most recent  injection performed 5/24/2022 helped relieve the knee symptoms. Pain has gradually returned. She states she has had several falls due to blood pressure issues which led to a hospitalization. She is currently in rehab at a [facility]. She denies any increased pain in her left knee. Pain is 4/10. Pain is worse with weightbearing activities. Pain improves somewhat with rest and elevation. Review of Systems:   Denies fever or chills. Denies numbness or tingling around the knee.     Past Medical History:   Diagnosis Date    Arthritis     Cataract     Chronic diastolic heart failure (HCC)     High blood pressure     Hyperlipidemia     stress test 01/01/2007    normal       Family History   Problem Relation Age of Onset    Cancer Other     High Blood Pressure Other        Past Surgical History:   Procedure Laterality Date    EYE SURGERY  1985    L eye - injury    JOINT REPLACEMENT      R TKR - Dr Mcnally Danger History    Not on file   Tobacco Use    Smoking status: Never    Smokeless tobacco: Never   Substance and Sexual Activity    Alcohol use: No     Alcohol/week: 0.0 standard drinks    Drug use: No    Sexual activity: Not on file       Current Outpatient Medications   Medication Sig Dispense Refill    sertraline (ZOLOFT) 100 MG tablet TAKE 1 TABLET BY MOUTH EVERY DAY 90 tablet 1    busPIRone (BUSPAR) 5 MG tablet TAKE 1 TABLET BY MOUTH TWICE A DAY 60 tablet 2    NIFEdipine (ADALAT CC) 30 MG extended release tablet Take 30 mg by mouth daily      carvedilol (COREG) 25 MG tablet TAKE 1 TABLET BY MOUTH  TWICE DAILY 180 tablet 3    losartan (COZAAR) 100 MG tablet TAKE 1 TABLET BY MOUTH  DAILY 90 tablet 3    acetaminophen (TYLENOL) 325 MG tablet Take 650 mg by mouth every 6 hours as needed for Pain      aspirin 81 MG tablet Take 81 mg by mouth daily. No current facility-administered medications for this visit. Allergies   Allergen Reactions    Codeine Rash    Sulfa Antibiotics Anaphylaxis and Other (See Comments)    Atorvastatin      Reaction: Increases LFT's        Objective:   She is alert, oriented x 3, pleasant, well nourished, developed and in no acute distress. Resp 18   Ht 5' 3\" (1.6 m)   Wt 172 lb (78 kg)   BMI 30.47 kg/m²      Examination of the left knee shows: There is not erythema. ROM- decreased range of motion is noted. There is mild to moderate pain associated with ROM testing. Extensor Mechanism is intact. X Rays: was not performed in the office today:       Diagnosis       ICD-10-CM    1. Primary osteoarthritis of left knee  M17.12            Assessment/ Plan:     Assessment:  Arthritis of the left knee. History of right knee arthroplasty. Not interested in pursuing left knee arthroplasty. Steroid injection provided good 2 months relief. She is requesting repeat injection today. Plan:    Procedures-left intra-articular corticosteroid injection. The risks and benefits of corticosteroid intra-articular injection was discussed today. All questions were answered to her satisfaction. Pablo Ramirez verbally consented to proceed with intra-articular injections today. PROCEDURE NOTE:     Pre op Diagnosis: left knee due to arthritis. The left knee was prepped in standard sterile fashion with  Alcohol. 2 cc of 0.25% Marcaine and 1 cc of Kenalog 40 mg was injected into the right lateral compartment without difficulty. Procedure was tolerated well without difficulty and a band-aid was applied. Advised to use ice over the knee for 15-20 minutes to relieve any pain associated to the injection procedure. Follow up:    Call or return to clinic if these symptoms worsen or fail to improve as anticipated.                                                  Myranda Lyons PA-C [address] [facility]                                         Disclaimer: This note was generated with use of a verbal recognition program (DRAGON) and an attempt was made to check for errors. It is possible that there are still dictated errors within this office note. If so, please bring any significant errors to my attention for an addendum. All efforts were made to ensure that this office note is accurate.

## 2022-10-10 ENCOUNTER — CARE COORDINATION (OUTPATIENT)
Dept: CASE MANAGEMENT | Age: 82
End: 2022-10-10

## 2022-10-10 NOTE — CARE COORDINATION
[facility] Discharge Call    10/10/2022    Patient: Magdalene Arreguin Patient : 1940   MRN: [id number]  Reason for Admission: falls  Discharge Date: 22 RARS: Readmission Risk Score: 13.1         Discharge Facility: [address] [place] [address],[address] to Jewel Garcia after he padgett dme back and said pt transferred to [facility] where her granddaughter works    Care Transitions Post Acute Facility Transition            Care Transitions Interventions         Future Appointments   Date Time Provider Jj Anthony   10/14/2022  3:00 PM MD GREER Alexander [address] [place] - DYD   2023  2:00 PM MD GREER Alexander [address] [facility], BSN, RN   [facility] Transition Nurse  [phone]

## 2022-10-26 ENCOUNTER — OFFICE VISIT (OUTPATIENT)
Dept: INTERNAL MEDICINE CLINIC | Age: 82
End: 2022-10-26
Payer: MEDICARE

## 2022-10-26 VITALS
TEMPERATURE: 97 F | BODY MASS INDEX: 28.35 KG/M2 | DIASTOLIC BLOOD PRESSURE: 82 MMHG | WEIGHT: 160 LBS | HEART RATE: 75 BPM | HEIGHT: 63 IN | SYSTOLIC BLOOD PRESSURE: 158 MMHG | OXYGEN SATURATION: 98 %

## 2022-10-26 DIAGNOSIS — R42 DIZZINESS: ICD-10-CM

## 2022-10-26 DIAGNOSIS — F41.9 ANXIETY AND DEPRESSION: ICD-10-CM

## 2022-10-26 DIAGNOSIS — Z91.81 AT RISK FOR FALLS: ICD-10-CM

## 2022-10-26 DIAGNOSIS — R63.4 WEIGHT LOSS: ICD-10-CM

## 2022-10-26 DIAGNOSIS — Z09 HOSPITAL DISCHARGE FOLLOW-UP: Primary | ICD-10-CM

## 2022-10-26 DIAGNOSIS — I10 PRIMARY HYPERTENSION: ICD-10-CM

## 2022-10-26 DIAGNOSIS — F32.A ANXIETY AND DEPRESSION: ICD-10-CM

## 2022-10-26 DIAGNOSIS — R39.9 UTI SYMPTOMS: ICD-10-CM

## 2022-10-26 LAB
BILIRUBIN, POC: 0
BLOOD URINE, POC: 0
CLARITY, POC: NORMAL
COLOR, POC: YELLOW
GLUCOSE URINE, POC: 0
KETONES, POC: 0
LEUKOCYTE EST, POC: NORMAL
NITRITE, POC: 0
PH, POC: 6
PROTEIN, POC: 0
SPECIFIC GRAVITY, POC: 1.01
UROBILINOGEN, POC: 4

## 2022-10-26 PROCEDURE — 1123F ACP DISCUSS/DSCN MKR DOCD: CPT | Performed by: STUDENT IN AN ORGANIZED HEALTH CARE EDUCATION/TRAINING PROGRAM

## 2022-10-26 PROCEDURE — 81002 URINALYSIS NONAUTO W/O SCOPE: CPT | Performed by: STUDENT IN AN ORGANIZED HEALTH CARE EDUCATION/TRAINING PROGRAM

## 2022-10-26 PROCEDURE — 3074F SYST BP LT 130 MM HG: CPT | Performed by: STUDENT IN AN ORGANIZED HEALTH CARE EDUCATION/TRAINING PROGRAM

## 2022-10-26 PROCEDURE — 1111F DSCHRG MED/CURRENT MED MERGE: CPT | Performed by: STUDENT IN AN ORGANIZED HEALTH CARE EDUCATION/TRAINING PROGRAM

## 2022-10-26 PROCEDURE — 3078F DIAST BP <80 MM HG: CPT | Performed by: STUDENT IN AN ORGANIZED HEALTH CARE EDUCATION/TRAINING PROGRAM

## 2022-10-26 PROCEDURE — 99214 OFFICE O/P EST MOD 30 MIN: CPT | Performed by: STUDENT IN AN ORGANIZED HEALTH CARE EDUCATION/TRAINING PROGRAM

## 2022-10-26 RX ORDER — AMOXICILLIN AND CLAVULANATE POTASSIUM 875; 125 MG/1; MG/1
1 TABLET, FILM COATED ORAL 2 TIMES DAILY
Qty: 14 TABLET | Refills: 0 | Status: SHIPPED | OUTPATIENT
Start: 2022-10-26 | End: 2022-11-02

## 2022-10-26 ASSESSMENT — ENCOUNTER SYMPTOMS
COUGH: 0
NAUSEA: 0
ABDOMINAL DISTENTION: 0
VOMITING: 0
ABDOMINAL PAIN: 0
APNEA: 0
PHOTOPHOBIA: 0
VOICE CHANGE: 0
DIARRHEA: 0
WHEEZING: 0
STRIDOR: 0
CONSTIPATION: 0
CHEST TIGHTNESS: 0
SHORTNESS OF BREATH: 0
CHOKING: 0
TROUBLE SWALLOWING: 0

## 2022-10-26 ASSESSMENT — PATIENT HEALTH QUESTIONNAIRE - PHQ9
7. TROUBLE CONCENTRATING ON THINGS, SUCH AS READING THE NEWSPAPER OR WATCHING TELEVISION: 0
SUM OF ALL RESPONSES TO PHQ QUESTIONS 1-9: 3
10. IF YOU CHECKED OFF ANY PROBLEMS, HOW DIFFICULT HAVE THESE PROBLEMS MADE IT FOR YOU TO DO YOUR WORK, TAKE CARE OF THINGS AT HOME, OR GET ALONG WITH OTHER PEOPLE: 0
SUM OF ALL RESPONSES TO PHQ9 QUESTIONS 1 & 2: 1
SUM OF ALL RESPONSES TO PHQ QUESTIONS 1-9: 3
5. POOR APPETITE OR OVEREATING: 0
SUM OF ALL RESPONSES TO PHQ QUESTIONS 1-9: 1
1. LITTLE INTEREST OR PLEASURE IN DOING THINGS: 1
2. FEELING DOWN, DEPRESSED OR HOPELESS: 0
6. FEELING BAD ABOUT YOURSELF - OR THAT YOU ARE A FAILURE OR HAVE LET YOURSELF OR YOUR FAMILY DOWN: 0
3. TROUBLE FALLING OR STAYING ASLEEP: 1
4. FEELING TIRED OR HAVING LITTLE ENERGY: 1
9. THOUGHTS THAT YOU WOULD BE BETTER OFF DEAD, OR OF HURTING YOURSELF: 0
SUM OF ALL RESPONSES TO PHQ9 QUESTIONS 1 & 2: 1
SUM OF ALL RESPONSES TO PHQ QUESTIONS 1-9: 3
SUM OF ALL RESPONSES TO PHQ QUESTIONS 1-9: 1
SUM OF ALL RESPONSES TO PHQ QUESTIONS 1-9: 3
SUM OF ALL RESPONSES TO PHQ QUESTIONS 1-9: 1
SUM OF ALL RESPONSES TO PHQ QUESTIONS 1-9: 1
1. LITTLE INTEREST OR PLEASURE IN DOING THINGS: 1
8. MOVING OR SPEAKING SO SLOWLY THAT OTHER PEOPLE COULD HAVE NOTICED. OR THE OPPOSITE, BEING SO FIGETY OR RESTLESS THAT YOU HAVE BEEN MOVING AROUND A LOT MORE THAN USUAL: 0
2. FEELING DOWN, DEPRESSED OR HOPELESS: 0

## 2022-10-26 NOTE — ASSESSMENT & PLAN NOTE
No response with Sertraline for several months.    Will wean down Sertraline for the next 4 weeks, and switch to Mirtazapine   Continue Bupropion

## 2022-10-26 NOTE — PATIENT INSTRUCTIONS
As we discussed:      Please check your Blood pressure 2 times a day (once in the morning, once at bedtime). Check your blood pressure when you feel dizzy. Hold Nifedipine if your BP continue to be 120/80 or less. Please wear your neck collar during the day, or at least when you are doing activities. Start weaning down Sertaline for 4 weeks   - Take it every other day for 2 weeks   - then 3 times a week for 1 week followed   - 2 times a week for 1 week.

## 2022-10-26 NOTE — ASSESSMENT & PLAN NOTE
Still have intermittent episode of dizziness, especially when she stand up after lying down for a long period of time   I advised patient to hold her Nifedipine if her BP less than 120/80

## 2022-10-26 NOTE — ASSESSMENT & PLAN NOTE
Ambulate with walker   Discussed with patient and her family at length   Family interested in assisted facility however patient and her  are not yet ready

## 2022-10-26 NOTE — PROGRESS NOTES
Post-Discharge Transitional Care Follow Up      Maxine Newberry   YOB: 1940    Date of Office Visit:  10/26/2022  Date of Hospital Admission: 9/12/22  Date of Hospital Discharge: 9/16/22  Readmission Risk Score (high >=14%. Medium >=10%):Readmission Risk Score: 13.1      Care management risk score Rising risk (score 2-5) and Complex Care (Scores >=6): No Risk Score On File     Non face to face  following discharge, date last encounter closed (first attempt may have been earlier): *No documented post hospital discharge outreach found in the last 14 days     Call initiated 2 business days of discharge: *No response recorded in the last 14 days     Hospital discharge follow-up  -     [place] DISCHARGE MEDS RECONCILED W/ CURRENT OUTPATIENT MED LIST  -     CBC with Auto Differential; Future  -     Comprehensive Metabolic Panel; Future  -     TSH with Reflex; Future  Primary hypertension  Weight loss  -     CBC with Auto Differential; Future  -     Comprehensive Metabolic Panel; Future  -     TSH with Reflex; Future  At risk for falls  Assessment & Plan:  Ambulate with walker   Discussed with patient and her family at length   Family interested in assisted facility however patient and her  are not yet ready   Anxiety and depression  Assessment & Plan:  No response with Sertraline for several months. Will wean down Sertraline for the next 4 weeks, and switch to Mirtazapine   Continue Bupropion   Dizziness  Assessment & Plan:  Still have intermittent episode of dizziness, especially when she stand up after lying down for a long period of time   I advised patient to hold her Nifedipine if her BP less than 120/80      UTI symptoms  -     MICROSCOPIC URINALYSIS; Future  -     Urinalysis with Microscopic  -     POCT Urinalysis no Micro    Medical Decision Making: moderate complexity  Return in 4 weeks (on 11/23/2022).     On this date 10/26/2022 I have spent 30 minutes reviewing previous notes, test results and face to face with the patient discussing the diagnosis and importance of compliance with the treatment plan as well as documenting on the day of the visit. Subjective:   HPI  The patient presented today with her family member, grand daughter-in-law    Inpatient course: Discharge summary reviewed- see chart. Admitted from 9/12/2022 to 9/16/2022 at \[facility]\"" for dizziness, recurrent fall and hypertensive urgency. Hypertensive urgency suspected due to patient not taking her medications, resolved after patient was placed on her home blood pressure. They suspected that her dizzy with secondary to orthostasis, which improved upon her discharge. During her stay they also obtain MRI specially for cervical spine shows concern for cervical myelopathy, severe degenerative changes at C5 and C6. She was then evaluated by neurosurgery recommended J collar and ambulation with a walker. She was then discharged to [facility]. I was not given access records at [facility], however according to her granddaughter, patient was treated with Macrobid for UTI. There was record of days when she is not taking her medications and that she fell once during her stay at [facility]. Interval history/Current status:   She was discharged home on 10/22/2022. Since got home, she is doing a little better but still not feel completely back to her normal self. Lives at home with her  is also her caregiver and help her with medication. Dizziness improved, however when she is lying down for long time and try to get up she still experience slightly dizziness. No other falls at home. She report that she does feel depressed and down. She feels like she does not have energy to do much things. Over the past 2 years due to pandemic she and her  has been isolated from others including the family members. She does feel guilty not be able to spend time with her friends like how she used to.   She has been taking sertraline but have not seen any effect for several months. The patient stated her weight has been stable, however according to her grand daughter-in-law patient continues to lose weight. Her weight is down by 12 pounds since her last visit 21 days ago on October 5, 2022. Since evaluation by neurosurgery, she has not used her J collar as recommended. She reports that it is felt uncomfortable and she has not been able to eat and drink while wearing the collar. Granddaughter reported that she completed 7 days of Macrobid while staying at [facility] for UTI. However was not be able to locate any urine culture. Patient report that she still constantly have dysuria along with burning sensation. She denies any fever, chills, abdominal pain, flank pain, change in bowel movement, blood in stool. Patient Active Problem List   Diagnosis    Hypertension    Hyperlipidemia    Hypercalcemia    Generalized weakness    Edema    Stress    Bacteriuria, asymptomatic    Transient cerebral ischemia    Morbid obesity due to excess calories ([address]    History of total knee arthroplasty, right    Primary osteoarthritis of left knee    Seasonal affective disorder (HCC)    Obstipation    Weight loss    Anxiety and depression    Tremor of right hand    Dizziness    Hypertensive urgency    At risk for falls       Medications listed as ordered at the time of discharge from hospital     Medication List            Accurate as of October 26, 2022  4:18 PM. If you have any questions, ask your nurse or doctor.                 START taking these medications      amoxicillin-clavulanate 875-125 MG per tablet  Commonly known as: AUGMENTIN  Take 1 tablet by mouth 2 times daily for 7 days  Started by: So Vera MD            CONTINUE taking these medications      acetaminophen 325 MG tablet  Commonly known as: TYLENOL     aspirin 81 MG tablet     busPIRone 5 MG tablet  Commonly known as: BUSPAR  TAKE 1 TABLET BY MOUTH TWICE A DAY     carvedilol 25 MG tablet  Commonly known as: COREG  TAKE 1 TABLET BY MOUTH  TWICE DAILY     losartan 100 MG tablet  Commonly known as: COZAAR  TAKE 1 TABLET BY MOUTH  DAILY     NIFEdipine 30 MG extended release tablet  Commonly known as: ADALAT CC     sertraline 100 MG tablet  Commonly known as: ZOLOFT  TAKE 1 TABLET BY MOUTH EVERY DAY               Where to Get Your Medications        These medications were sent to [address], [place] [address]. Tejas Vargas [phone]  [address] [place] [address], [address] [place] [address]      Phone: [phone]   amoxicillin-clavulanate 875-125 MG per tablet          Medications marked \"taking\" at this time  Outpatient Medications Marked as Taking for the 10/26/22 encounter (Office Visit) with Aliya Rasheed MD   Medication Sig Dispense Refill    amoxicillin-clavulanate (AUGMENTIN) 875-125 MG per tablet Take 1 tablet by mouth 2 times daily for 7 days 14 tablet 0    sertraline (ZOLOFT) 100 MG tablet TAKE 1 TABLET BY MOUTH EVERY DAY 90 tablet 1    busPIRone (BUSPAR) 5 MG tablet TAKE 1 TABLET BY MOUTH TWICE A DAY 60 tablet 2    NIFEdipine (ADALAT CC) 30 MG extended release tablet Take 30 mg by mouth daily      carvedilol (COREG) 25 MG tablet TAKE 1 TABLET BY MOUTH  TWICE DAILY 180 tablet 3    losartan (COZAAR) 100 MG tablet TAKE 1 TABLET BY MOUTH  DAILY 90 tablet 3    acetaminophen (TYLENOL) 325 MG tablet Take 650 mg by mouth every 6 hours as needed for Pain      aspirin 81 MG tablet Take 81 mg by mouth daily. Medications patient taking as of now reconciled against medications ordered at time of hospital discharge: Yes    Review of Systems   Constitutional:  Positive for unexpected weight change. Negative for activity change, appetite change, chills, diaphoresis, fatigue and fever. HENT:  Negative for trouble swallowing and voice change. Eyes:  Negative for photophobia and visual disturbance.    Respiratory:  Negative for apnea, cough, choking, chest tightness, shortness of breath, wheezing and stridor. Cardiovascular:  Negative for chest pain, palpitations and leg swelling. Gastrointestinal:  Negative for abdominal distention, abdominal pain, constipation, diarrhea, nausea and vomiting. Genitourinary:  Positive for difficulty urinating and dysuria. Negative for pelvic pain and urgency. Skin:  Negative for rash and wound. Neurological:  Negative for dizziness, weakness and light-headedness. Psychiatric/Behavioral:  Negative for agitation and behavioral problems. Objective:    BP (!) 158/82   Pulse 75   Temp 97 °F (36.1 °C)   Ht 5' 3\" (1.6 m)   Wt 160 lb (72.6 kg)   SpO2 98%   BMI 28.34 kg/m²   Physical Exam  Vitals and nursing note reviewed. Constitutional:       General: She is not in acute distress. Appearance: She is well-developed. She is not diaphoretic. HENT:      Head: Normocephalic and atraumatic. Eyes:      General: No scleral icterus. Conjunctiva/sclera: Conjunctivae normal.      Pupils: Pupils are equal, round, and reactive to light. Cardiovascular:      Rate and Rhythm: Normal rate and regular rhythm. Heart sounds: Normal heart sounds. No murmur heard. No friction rub. No gallop. Pulmonary:      Effort: Pulmonary effort is normal. No respiratory distress. Breath sounds: Normal breath sounds. No wheezing or rales. Chest:      Chest wall: No tenderness. Abdominal:      General: Bowel sounds are normal. There is no distension. Palpations: Abdomen is soft. Musculoskeletal:         General: No tenderness or deformity. Normal range of motion. Cervical back: Normal range of motion and neck supple. Skin:     General: Skin is warm and dry. Neurological:      Mental Status: She is alert and oriented to person, place, and time. Cranial Nerves: No cranial nerve deficit. Sensory: No sensory deficit.    Psychiatric:         Behavior: Behavior normal.       An electronic signature was used to authenticate this note.   --Aliya Rasheed MD

## 2022-10-27 ENCOUNTER — TELEPHONE (OUTPATIENT)
Dept: INTERNAL MEDICINE CLINIC | Age: 82
End: 2022-10-27

## 2022-10-27 LAB
REASON FOR REJECTION: NORMAL
REJECTED TEST: NORMAL

## 2022-10-27 NOTE — TELEPHONE ENCOUNTER
[facility] said the [facility]inalysis was collected in the wrong tube    She would need to come in again and supply another sample         Please advise and call

## 2022-10-27 NOTE — TELEPHONE ENCOUNTER
Spoke to pt she stated this is the 3rd time she has had to go back       Offered the pt to come to the office and we can send it from here.

## 2022-10-31 ENCOUNTER — NURSE ONLY (OUTPATIENT)
Dept: INTERNAL MEDICINE CLINIC | Age: 82
End: 2022-10-31

## 2022-10-31 DIAGNOSIS — I16.0 HYPERTENSIVE URGENCY: Primary | ICD-10-CM

## 2022-10-31 DIAGNOSIS — R39.9 UTI SYMPTOMS: ICD-10-CM

## 2022-11-01 ENCOUNTER — TELEPHONE (OUTPATIENT)
Dept: INTERNAL MEDICINE CLINIC | Age: 82
End: 2022-11-01

## 2022-11-01 LAB
BACTERIA: ABNORMAL /HPF
BILIRUBIN URINE: NEGATIVE
BLOOD, URINE: NEGATIVE
CLARITY: ABNORMAL
COLOR: YELLOW
EPITHELIAL CELLS, UA: 5 /HPF (ref 0–5)
GLUCOSE URINE: NEGATIVE MG/DL
HYALINE CASTS: 3 /LPF (ref 0–8)
KETONES, URINE: NEGATIVE MG/DL
LEUKOCYTE ESTERASE, URINE: ABNORMAL
MICROSCOPIC EXAMINATION: YES
NITRITE, URINE: NEGATIVE
PH UA: 7.5 (ref 5–8)
PROTEIN UA: NEGATIVE MG/DL
RBC UA: 2 /HPF (ref 0–4)
SPECIFIC GRAVITY UA: 1 (ref 1–1.03)
URINE TYPE: ABNORMAL
UROBILINOGEN, URINE: 0.2 E.U./DL
WBC UA: 6 /HPF (ref 0–5)

## 2022-11-01 NOTE — TELEPHONE ENCOUNTER
Patient started home care 11/1/2022    They will see her for skilled nursing 1x weekly  PT and OT     They also wanted to know when she should start taking antibiotics for UTI    Please advise

## 2022-11-28 ENCOUNTER — OFFICE VISIT (OUTPATIENT)
Dept: INTERNAL MEDICINE CLINIC | Age: 82
End: 2022-11-28
Payer: MEDICARE

## 2022-11-28 VITALS
SYSTOLIC BLOOD PRESSURE: 124 MMHG | BODY MASS INDEX: 28.17 KG/M2 | DIASTOLIC BLOOD PRESSURE: 68 MMHG | HEIGHT: 63 IN | WEIGHT: 159 LBS

## 2022-11-28 DIAGNOSIS — F41.9 ANXIETY AND DEPRESSION: Primary | ICD-10-CM

## 2022-11-28 DIAGNOSIS — R35.0 FREQUENCY OF URINATION: ICD-10-CM

## 2022-11-28 DIAGNOSIS — F32.A ANXIETY AND DEPRESSION: Primary | ICD-10-CM

## 2022-11-28 LAB
BILIRUBIN, POC: NORMAL
BLOOD URINE, POC: NORMAL
CLARITY, POC: CLEAR
COLOR, POC: YELLOW
GLUCOSE URINE, POC: NORMAL
KETONES, POC: NORMAL
LEUKOCYTE EST, POC: NORMAL
NITRITE, POC: NORMAL
PH, POC: 6
PROTEIN, POC: NORMAL
SPECIFIC GRAVITY, POC: 1.01
UROBILINOGEN, POC: 0.2

## 2022-11-28 PROCEDURE — 3078F DIAST BP <80 MM HG: CPT | Performed by: INTERNAL MEDICINE

## 2022-11-28 PROCEDURE — 99214 OFFICE O/P EST MOD 30 MIN: CPT | Performed by: INTERNAL MEDICINE

## 2022-11-28 PROCEDURE — 81002 URINALYSIS NONAUTO W/O SCOPE: CPT | Performed by: INTERNAL MEDICINE

## 2022-11-28 PROCEDURE — 3074F SYST BP LT 130 MM HG: CPT | Performed by: INTERNAL MEDICINE

## 2022-11-28 PROCEDURE — 1123F ACP DISCUSS/DSCN MKR DOCD: CPT | Performed by: INTERNAL MEDICINE

## 2022-11-28 RX ORDER — MIRTAZAPINE 7.5 MG/1
7.5 TABLET, FILM COATED ORAL NIGHTLY
Qty: 30 TABLET | Refills: 1 | Status: SHIPPED | OUTPATIENT
Start: 2022-11-28

## 2022-11-28 ASSESSMENT — PATIENT HEALTH QUESTIONNAIRE - PHQ9
8. MOVING OR SPEAKING SO SLOWLY THAT OTHER PEOPLE COULD HAVE NOTICED. OR THE OPPOSITE, BEING SO FIGETY OR RESTLESS THAT YOU HAVE BEEN MOVING AROUND A LOT MORE THAN USUAL: 0
1. LITTLE INTEREST OR PLEASURE IN DOING THINGS: 0
SUM OF ALL RESPONSES TO PHQ QUESTIONS 1-9: 0
6. FEELING BAD ABOUT YOURSELF - OR THAT YOU ARE A FAILURE OR HAVE LET YOURSELF OR YOUR FAMILY DOWN: 0
SUM OF ALL RESPONSES TO PHQ QUESTIONS 1-9: 0
7. TROUBLE CONCENTRATING ON THINGS, SUCH AS READING THE NEWSPAPER OR WATCHING TELEVISION: 0
4. FEELING TIRED OR HAVING LITTLE ENERGY: 0
2. FEELING DOWN, DEPRESSED OR HOPELESS: 0
SUM OF ALL RESPONSES TO PHQ QUESTIONS 1-9: 0
SUM OF ALL RESPONSES TO PHQ9 QUESTIONS 1 & 2: 0
SUM OF ALL RESPONSES TO PHQ QUESTIONS 1-9: 0
9. THOUGHTS THAT YOU WOULD BE BETTER OFF DEAD, OR OF HURTING YOURSELF: 0
5. POOR APPETITE OR OVEREATING: 0
10. IF YOU CHECKED OFF ANY PROBLEMS, HOW DIFFICULT HAVE THESE PROBLEMS MADE IT FOR YOU TO DO YOUR WORK, TAKE CARE OF THINGS AT HOME, OR GET ALONG WITH OTHER PEOPLE: 0
3. TROUBLE FALLING OR STAYING ASLEEP: 0

## 2022-11-28 NOTE — PROGRESS NOTES
Karoline Lockwood (:  1940) is a [de-identified] y.o. female,Established patient, here for evaluation of the following chief complaint(s):  Follow-Up from Hospital         ASSESSMENT/PLAN:  1. Anxiety and depression   - will start mirtazapine 7.5mg nightly, counseled to look out for excessive fatigue with this medication. 2. Frequency of urination  - UA normal  -     POCT Urinalysis no Micro      Return in about 2 months (around 2023) for anxiety/depression. Subjective   SUBJECTIVE/OBJECTIVE:  HPI    Still having significant anxiety and depression. The change in season hasn't helped - she always has an element of seasonal depression. She did not notice a difference on or off of the sertraline. She has tapered off of the medication by now. The only thing her  has noticed that may have improved is decrease in tremors, she does not have the episodes of jerking. No weight gain, overall weight is stable from the last appointment. She has lost weight over the last couple of years. She feels like she sleeps a lot. She wants to make sure that the UTI has resolve,d since that was an issue on recheck. No dysuria right now. Review of Systems       Objective   Physical Exam  Vitals reviewed. Constitutional:       General: She is not in acute distress. Appearance: Normal appearance. She is well-developed. HENT:      Head: Normocephalic and atraumatic. Cardiovascular:      Rate and Rhythm: Normal rate and regular rhythm. Heart sounds: Normal heart sounds. Pulmonary:      Effort: Pulmonary effort is normal. No respiratory distress. Breath sounds: Normal breath sounds. Skin:     General: Skin is warm and dry. Neurological:      Mental Status: She is alert. Psychiatric:         Mood and Affect: Mood is anxious and depressed. Behavior: Behavior normal.         Thought Content:  Thought content normal.         Judgment: Judgment normal.                An electronic signature was used to authenticate this note.     --Emilia Ventura MD

## 2022-12-20 ENCOUNTER — TELEPHONE (OUTPATIENT)
Dept: PHARMACY | Facility: CLINIC | Age: 82
End: 2022-12-20

## 2022-12-20 NOTE — TELEPHONE ENCOUNTER
[facility] CLINICAL PHARMACY: ADHERENCE REVIEW  Identified care gap per [employer] fills at [facility]: ACE/ARB adherence    Last Visit: 22    ASSESSMENT  ACE/ARB ADHERENCE    Fail date 22  LOSARTAN POT TAB 100MG last filled on 22 for 90 day supply. Next refill due: 22    Per [facility]:   LOSARTAN POT TAB 100MG last picked up on 22 for 90 day supply. 1 refills remaining. Billed through [address] [facility] will process a refill     BP Readings from Last 3 Encounters:   22 124/68   10/26/22 (!) 158/82   22 (!) 115/49     Estimated Creatinine Clearance: 85 mL/min (A) (based on SCr of 0.5 mg/dL (L)). PLAN  The following are interventions that have been identified:   - Patient overdue refilling Losartan and active on home medication list.     Reached patient to review. Called patient to discuss adherence for Losartan and she stated she takes this medication every morning     She stated she will see provider in January and at that time she'll go over all her medications.      Future Appointments   Date Time Provider Jj Anthony   2023  2:00 PM Beto Solitario MD [facility] [address] [place] - DYD   2023 10:45 AM KIRA Kaplan [place] [id number] [facility]   [address] [place] [address]  // Department, toll free [phone], Option 2    For Pharmacy Admin Tracking Only    Program: [address] in place:  No  Recommendation Provided To: Pharmacy: 1  Intervention Detail: Adherence Monitorin  Gap Closed?: Yes   Intervention Accepted By: Pharmacy: 1  Time Spent (min): 10

## 2022-12-21 RX ORDER — MIRTAZAPINE 7.5 MG/1
7.5 TABLET, FILM COATED ORAL NIGHTLY
Qty: 30 TABLET | Refills: 1 | OUTPATIENT
Start: 2022-12-21

## 2023-01-03 DIAGNOSIS — E78.5 HYPERLIPIDEMIA, UNSPECIFIED HYPERLIPIDEMIA TYPE: ICD-10-CM

## 2023-01-03 LAB
CHOLESTEROL, TOTAL: 245 MG/DL (ref 0–199)
HDLC SERPL-MCNC: 61 MG/DL (ref 40–60)
LDL CHOLESTEROL CALCULATED: 160 MG/DL
TRIGL SERPL-MCNC: 119 MG/DL (ref 0–150)
VLDLC SERPL CALC-MCNC: 24 MG/DL

## 2023-01-05 ENCOUNTER — OFFICE VISIT (OUTPATIENT)
Dept: INTERNAL MEDICINE CLINIC | Age: 83
End: 2023-01-05

## 2023-01-05 VITALS
BODY MASS INDEX: 29.59 KG/M2 | DIASTOLIC BLOOD PRESSURE: 68 MMHG | WEIGHT: 167 LBS | SYSTOLIC BLOOD PRESSURE: 124 MMHG | HEIGHT: 63 IN

## 2023-01-05 DIAGNOSIS — I10 PRIMARY HYPERTENSION: ICD-10-CM

## 2023-01-05 DIAGNOSIS — F32.A ANXIETY AND DEPRESSION: Primary | ICD-10-CM

## 2023-01-05 DIAGNOSIS — F41.9 ANXIETY AND DEPRESSION: Primary | ICD-10-CM

## 2023-01-05 DIAGNOSIS — E78.5 HYPERLIPIDEMIA, UNSPECIFIED HYPERLIPIDEMIA TYPE: ICD-10-CM

## 2023-01-05 ASSESSMENT — PATIENT HEALTH QUESTIONNAIRE - PHQ9
9. THOUGHTS THAT YOU WOULD BE BETTER OFF DEAD, OR OF HURTING YOURSELF: 0
1. LITTLE INTEREST OR PLEASURE IN DOING THINGS: 0
SUM OF ALL RESPONSES TO PHQ9 QUESTIONS 1 & 2: 0
4. FEELING TIRED OR HAVING LITTLE ENERGY: 0
SUM OF ALL RESPONSES TO PHQ QUESTIONS 1-9: 0
SUM OF ALL RESPONSES TO PHQ QUESTIONS 1-9: 0
3. TROUBLE FALLING OR STAYING ASLEEP: 0
2. FEELING DOWN, DEPRESSED OR HOPELESS: 0
SUM OF ALL RESPONSES TO PHQ QUESTIONS 1-9: 0
5. POOR APPETITE OR OVEREATING: 0
6. FEELING BAD ABOUT YOURSELF - OR THAT YOU ARE A FAILURE OR HAVE LET YOURSELF OR YOUR FAMILY DOWN: 0
8. MOVING OR SPEAKING SO SLOWLY THAT OTHER PEOPLE COULD HAVE NOTICED. OR THE OPPOSITE, BEING SO FIGETY OR RESTLESS THAT YOU HAVE BEEN MOVING AROUND A LOT MORE THAN USUAL: 0
7. TROUBLE CONCENTRATING ON THINGS, SUCH AS READING THE NEWSPAPER OR WATCHING TELEVISION: 0
SUM OF ALL RESPONSES TO PHQ QUESTIONS 1-9: 0
10. IF YOU CHECKED OFF ANY PROBLEMS, HOW DIFFICULT HAVE THESE PROBLEMS MADE IT FOR YOU TO DO YOUR WORK, TAKE CARE OF THINGS AT HOME, OR GET ALONG WITH OTHER PEOPLE: 0

## 2023-01-05 NOTE — PROGRESS NOTES
Maylin Vicente (:  1940) is a [de-identified] y.o. female,Established patient, here for evaluation of the following chief complaint(s):  Hypertension         ASSESSMENT/PLAN:  1. Anxiety and depression   - continue mirtazapine 7.5mg nightly  2. Primary hypertension  - controlled, continue losartan 100mg daily, carvedilol 25mg twice daily, nifedipine 30mg daily  -     Comprehensive Metabolic Panel; Future  -     Lipid Panel; Future  -     CBC with Auto Differential; Future  3. Hyperlipidemia, unspecified hyperlipidemia type  - stable, currently managing with diet  -     Comprehensive Metabolic Panel; Future  -     Lipid Panel; Future  -     CBC with Auto Differential; Future    Return in about 6 months (around 2023) for AWV. Subjective   SUBJECTIVE/OBJECTIVE:  HPI    Anxiety has been much better with the mirtazapine. No side effects, not noticing any morning sleepiness. She is noticing less tremors. Hypertension - taking blood pressure medication. No chest pain or shortness of breath. Review of Systems       Objective   Physical Exam  Vitals reviewed. Constitutional:       General: She is not in acute distress. Appearance: Normal appearance. She is well-developed. HENT:      Head: Normocephalic and atraumatic. Cardiovascular:      Rate and Rhythm: Normal rate and regular rhythm. Heart sounds: Normal heart sounds. Pulmonary:      Effort: Pulmonary effort is normal. No respiratory distress. Breath sounds: Normal breath sounds. Skin:     General: Skin is warm and dry. Neurological:      Mental Status: She is alert. Psychiatric:         Behavior: Behavior normal.         Thought Content: Thought content normal.         Judgment: Judgment normal.                An electronic signature was used to authenticate this note.     --Fantasma Way MD

## 2023-01-09 ENCOUNTER — OFFICE VISIT (OUTPATIENT)
Dept: ORTHOPEDIC SURGERY | Age: 83
End: 2023-01-09
Payer: MEDICARE

## 2023-01-09 VITALS — HEIGHT: 63 IN | WEIGHT: 167 LBS | BODY MASS INDEX: 29.59 KG/M2

## 2023-01-09 DIAGNOSIS — M17.12 PRIMARY OSTEOARTHRITIS OF LEFT KNEE: Primary | ICD-10-CM

## 2023-01-09 PROCEDURE — 20610 DRAIN/INJ JOINT/BURSA W/O US: CPT | Performed by: PHYSICIAN ASSISTANT

## 2023-01-09 PROCEDURE — 99999 PR OFFICE/OUTPT VISIT,PROCEDURE ONLY: CPT | Performed by: PHYSICIAN ASSISTANT

## 2023-01-09 RX ORDER — BUPIVACAINE HYDROCHLORIDE 2.5 MG/ML
2 INJECTION, SOLUTION INFILTRATION; PERINEURAL ONCE
Status: COMPLETED | OUTPATIENT
Start: 2023-01-09 | End: 2023-01-09

## 2023-01-09 RX ORDER — TRIAMCINOLONE ACETONIDE 40 MG/ML
40 INJECTION, SUSPENSION INTRA-ARTICULAR; INTRAMUSCULAR ONCE
Status: COMPLETED | OUTPATIENT
Start: 2023-01-09 | End: 2023-01-09

## 2023-01-09 RX ADMIN — BUPIVACAINE HYDROCHLORIDE 5 MG: 2.5 INJECTION, SOLUTION INFILTRATION; PERINEURAL at 10:27

## 2023-01-09 RX ADMIN — TRIAMCINOLONE ACETONIDE 40 MG: 40 INJECTION, SUSPENSION INTRA-ARTICULAR; INTRAMUSCULAR at 10:28

## 2023-01-10 NOTE — PROGRESS NOTES
Subjective:      Patient ID: Iain Claudio is a [de-identified] y.o.  female who is here for evaluation and treatment of left knee pain related to arthritis. The most recent  injection performed 10/5/2022 helped relieve the knee symptoms. Pain has gradually returned. Denies recent injury. Pain is 7/10. Pain is worse with weightbearing activities. Pain improves somewhat with rest and elevation. Review of Systems:   Denies fever or chills. Denies numbness or tingling around the knee. Past Medical History:   Diagnosis Date    Arthritis     Cataract     Chronic diastolic heart failure (HCC)     High blood pressure     Hyperlipidemia     stress test 01/01/2007    normal       Family History   Problem Relation Age of Onset    Cancer Other     High Blood Pressure Other        Past Surgical History:   Procedure Laterality Date    EYE SURGERY  1985    L eye - injury    JOINT REPLACEMENT      R TKR - Dr Carmencita Snow History    Not on file   Tobacco Use    Smoking status: Never    Smokeless tobacco: Never   Substance and Sexual Activity    Alcohol use: No     Alcohol/week: 0.0 standard drinks    Drug use: No    Sexual activity: Not on file       Current Outpatient Medications   Medication Sig Dispense Refill    mirtazapine (REMERON) 7.5 MG tablet Take 1 tablet by mouth nightly 30 tablet 1    busPIRone (BUSPAR) 5 MG tablet TAKE 1 TABLET BY MOUTH TWICE A DAY 60 tablet 2    NIFEdipine (ADALAT CC) 30 MG extended release tablet Take 30 mg by mouth daily      carvedilol (COREG) 25 MG tablet TAKE 1 TABLET BY MOUTH  TWICE DAILY 180 tablet 3    losartan (COZAAR) 100 MG tablet TAKE 1 TABLET BY MOUTH  DAILY 90 tablet 3    acetaminophen (TYLENOL) 325 MG tablet Take 650 mg by mouth every 6 hours as needed for Pain      aspirin 81 MG tablet Take 81 mg by mouth daily. No current facility-administered medications for this visit.        Allergies   Allergen Reactions    Codeine Rash    Sulfa Antibiotics Anaphylaxis and Other (See Comments)    Atorvastatin      Reaction: Increases LFT's        Objective:   She is alert, oriented x 3, pleasant, well nourished, developed and in no acute distress. Ht 5' 3\" (1.6 m)   Wt 167 lb (75.8 kg)   BMI 29.58 kg/m²      Examination of the left knee shows: There is not erythema. ROM- decreased range of motion is noted. There is mild to moderate pain associated with ROM testing. Extensor Mechanism is intact. X Rays: was not performed in the office today:       Diagnosis       ICD-10-CM    1. Primary osteoarthritis of left knee  M17.12 VT ARTHROCENTESIS ASPIR&/INJ MAJOR JT/BURSA W/O US     triamcinolone acetonide (KENALOG-40) injection 40 mg     bupivacaine (MARCAINE) 0.25 % injection 5 mg           Assessment/ Plan:     Assessment:  Arthritis of the left knee. I have reviewed conservative treatment of knee symptoms/arthritis, according to AAOS Clinical Practice Guidelines:  1)  Recommend oral or topical NSAIDs to reduce pain and swelling. 2)  Recommend acetaminophen to reduce pain. 3)  Oral narcotics, including tramadol, result in a significant increase of adverse events and are not effective at improving pain or function for treatment of osteoarthritis of the knee. 4)  Recommend maintaining weight in a healthy range to reduce stresses on the knee, particularly the patellofemoral compartment which sees up to 6x body weight. 5)  Home exercise program, focusing on strengthening, low impact aerobic exercises, and neuromuscular education. 6)  Intra-articular corticosteroid injections are an option. Informed patient corticosteroid injections can be performed every 3-4 months as needed. 7)  Evidence for intra-articular hyaluronic acid injections (viscosupplementation) is not as strong, but may benefit a subset of patients who have failed other options. Informed patient viscosupplementation can be performed every 6 months as needed.    8) Bracing and cane use can improve pain and function. Although not specifically listed in the CPGs, ice therapy and topical patches such as Salonpas are good options as well. Other non-surgical options including Coolief (cooled frequency ablation of the geniculate nerves), stem cell injections, PRP injections were discussed. Surgical option, knee arthroplasty discussed. Plan:    Procedures-left intra-articular corticosteroid injection. The risks and benefits of corticosteroid intra-articular injection was discussed today. All questions were answered to her satisfaction. Ramesh Casey verbally consented to proceed with intra-articular injections today. PROCEDURE NOTE:     Pre op Diagnosis: left knee due to arthritis. The left knee was prepped in standard sterile fashion with  Alcohol. 2 cc of 0.25% Marcaine and 1 cc of Kenalog 40 mg was injected into the right lateral compartment without difficulty. Procedure was tolerated well without difficulty and a band-aid was applied. Advised to use ice over the knee for 15-20 minutes to relieve any pain associated to the injection procedure. Follow up:    Call or return to clinic if these symptoms worsen or fail to improve as anticipated. Dameon Asif PA-C   Senior Physician Assistant   [facility]                                         Disclaimer: This note was generated with use of a verbal recognition program (DRAGON) and an attempt was made to check for errors. It is possible that there are still dictated errors within this office note. If so, please bring any significant errors to my attention for an addendum. All efforts were made to ensure that this office note is accurate.

## 2023-01-13 RX ORDER — MIRTAZAPINE 7.5 MG/1
7.5 TABLET, FILM COATED ORAL NIGHTLY
Qty: 90 TABLET | Refills: 1 | Status: SHIPPED | OUTPATIENT
Start: 2023-01-13

## 2023-01-17 RX ORDER — AMOXICILLIN AND CLAVULANATE POTASSIUM 875; 125 MG/1; MG/1
TABLET, FILM COATED ORAL
Qty: 14 TABLET | Refills: 0 | OUTPATIENT
Start: 2023-01-17

## 2023-01-23 RX ORDER — BUSPIRONE HYDROCHLORIDE 5 MG/1
TABLET ORAL
Qty: 180 TABLET | Refills: 1 | Status: SHIPPED | OUTPATIENT
Start: 2023-01-23

## 2023-02-20 RX ORDER — LOSARTAN POTASSIUM 100 MG/1
TABLET ORAL
Qty: 90 TABLET | Refills: 3 | Status: SHIPPED | OUTPATIENT
Start: 2023-02-20

## 2023-04-17 RX ORDER — CARVEDILOL 25 MG/1
TABLET ORAL
Qty: 180 TABLET | Refills: 1 | Status: SHIPPED | OUTPATIENT
Start: 2023-04-17

## 2023-04-17 RX ORDER — NIFEDIPINE 30 MG/1
TABLET, FILM COATED, EXTENDED RELEASE ORAL
Qty: 90 TABLET | Refills: 1 | Status: SHIPPED | OUTPATIENT
Start: 2023-04-17

## 2023-04-27 RX ORDER — CIPROFLOXACIN 500 MG/1
TABLET, FILM COATED ORAL
Qty: 10 TABLET | Refills: 2 | Status: SHIPPED | OUTPATIENT
Start: 2023-04-27

## 2023-07-06 DIAGNOSIS — R63.4 WEIGHT LOSS: ICD-10-CM

## 2023-07-06 DIAGNOSIS — Z09 HOSPITAL DISCHARGE FOLLOW-UP: ICD-10-CM

## 2023-07-06 DIAGNOSIS — I10 PRIMARY HYPERTENSION: ICD-10-CM

## 2023-07-06 DIAGNOSIS — E78.5 HYPERLIPIDEMIA, UNSPECIFIED HYPERLIPIDEMIA TYPE: ICD-10-CM

## 2023-07-06 LAB
ALBUMIN SERPL-MCNC: 4.1 G/DL (ref 3.4–5)
ALBUMIN/GLOB SERPL: 1.8 {RATIO} (ref 1.1–2.2)
ALP SERPL-CCNC: 79 U/L (ref 40–129)
ALT SERPL-CCNC: 8 U/L (ref 10–40)
ANION GAP SERPL CALCULATED.3IONS-SCNC: 13 MMOL/L (ref 3–16)
AST SERPL-CCNC: 11 U/L (ref 15–37)
BASOPHILS # BLD: 0.1 K/UL (ref 0–0.2)
BASOPHILS NFR BLD: 1.6 %
BILIRUB SERPL-MCNC: 1.5 MG/DL (ref 0–1)
BUN SERPL-MCNC: 14 MG/DL (ref 7–20)
CALCIUM SERPL-MCNC: 9.7 MG/DL (ref 8.3–10.6)
CHLORIDE SERPL-SCNC: 105 MMOL/L (ref 99–110)
CHOLEST SERPL-MCNC: 247 MG/DL (ref 0–199)
CO2 SERPL-SCNC: 25 MMOL/L (ref 21–32)
CREAT SERPL-MCNC: 0.6 MG/DL (ref 0.6–1.2)
DEPRECATED RDW RBC AUTO: 13.7 % (ref 12.4–15.4)
EOSINOPHIL # BLD: 0.2 K/UL (ref 0–0.6)
EOSINOPHIL NFR BLD: 4.2 %
GFR SERPLBLD CREATININE-BSD FMLA CKD-EPI: >60 ML/MIN/{1.73_M2}
GLUCOSE SERPL-MCNC: 106 MG/DL (ref 70–99)
HCT VFR BLD AUTO: 40.2 % (ref 36–48)
HDLC SERPL-MCNC: 65 MG/DL (ref 40–60)
HGB BLD-MCNC: 13.8 G/DL (ref 12–16)
LDLC SERPL CALC-MCNC: 162 MG/DL
LYMPHOCYTES # BLD: 1.1 K/UL (ref 1–5.1)
LYMPHOCYTES NFR BLD: 29.4 %
MCH RBC QN AUTO: 32 PG (ref 26–34)
MCHC RBC AUTO-ENTMCNC: 34.4 G/DL (ref 31–36)
MCV RBC AUTO: 93 FL (ref 80–100)
MONOCYTES # BLD: 0.3 K/UL (ref 0–1.3)
MONOCYTES NFR BLD: 7.2 %
NEUTROPHILS # BLD: 2.1 K/UL (ref 1.7–7.7)
NEUTROPHILS NFR BLD: 57.6 %
PLATELET # BLD AUTO: 196 K/UL (ref 135–450)
PMV BLD AUTO: 8.7 FL (ref 5–10.5)
POTASSIUM SERPL-SCNC: 4.3 MMOL/L (ref 3.5–5.1)
PROT SERPL-MCNC: 6.4 G/DL (ref 6.4–8.2)
RBC # BLD AUTO: 4.33 M/UL (ref 4–5.2)
SODIUM SERPL-SCNC: 143 MMOL/L (ref 136–145)
T4 FREE SERPL-MCNC: 1.1 NG/DL (ref 0.9–1.8)
TRIGL SERPL-MCNC: 101 MG/DL (ref 0–150)
TSH SERPL DL<=0.005 MIU/L-ACNC: 4.62 UIU/ML (ref 0.27–4.2)
VLDLC SERPL CALC-MCNC: 20 MG/DL
WBC # BLD AUTO: 3.6 K/UL (ref 4–11)

## 2023-07-10 ENCOUNTER — OFFICE VISIT (OUTPATIENT)
Dept: INTERNAL MEDICINE CLINIC | Age: 83
End: 2023-07-10

## 2023-07-10 ENCOUNTER — OFFICE VISIT (OUTPATIENT)
Dept: ORTHOPEDIC SURGERY | Age: 83
End: 2023-07-10
Payer: MEDICARE

## 2023-07-10 VITALS — RESPIRATION RATE: 18 BRPM | HEIGHT: 63 IN | WEIGHT: 167 LBS | BODY MASS INDEX: 29.59 KG/M2

## 2023-07-10 VITALS
WEIGHT: 174 LBS | SYSTOLIC BLOOD PRESSURE: 130 MMHG | HEIGHT: 63 IN | DIASTOLIC BLOOD PRESSURE: 68 MMHG | BODY MASS INDEX: 30.83 KG/M2

## 2023-07-10 DIAGNOSIS — F33.8 SEASONAL AFFECTIVE DISORDER (HCC): ICD-10-CM

## 2023-07-10 DIAGNOSIS — F41.9 ANXIETY AND DEPRESSION: ICD-10-CM

## 2023-07-10 DIAGNOSIS — R63.4 WEIGHT LOSS: ICD-10-CM

## 2023-07-10 DIAGNOSIS — Z00.00 MEDICARE ANNUAL WELLNESS VISIT, SUBSEQUENT: Primary | ICD-10-CM

## 2023-07-10 DIAGNOSIS — E78.5 HYPERLIPIDEMIA, UNSPECIFIED HYPERLIPIDEMIA TYPE: ICD-10-CM

## 2023-07-10 DIAGNOSIS — R25.1 TREMOR: ICD-10-CM

## 2023-07-10 DIAGNOSIS — M17.12 PRIMARY OSTEOARTHRITIS OF LEFT KNEE: Primary | ICD-10-CM

## 2023-07-10 DIAGNOSIS — F32.A ANXIETY AND DEPRESSION: ICD-10-CM

## 2023-07-10 DIAGNOSIS — I10 PRIMARY HYPERTENSION: ICD-10-CM

## 2023-07-10 PROCEDURE — 99999 PR OFFICE/OUTPT VISIT,PROCEDURE ONLY: CPT | Performed by: PHYSICIAN ASSISTANT

## 2023-07-10 PROCEDURE — 20610 DRAIN/INJ JOINT/BURSA W/O US: CPT | Performed by: PHYSICIAN ASSISTANT

## 2023-07-10 RX ORDER — BUPIVACAINE HYDROCHLORIDE 2.5 MG/ML
2 INJECTION, SOLUTION INFILTRATION; PERINEURAL ONCE
Status: COMPLETED | OUTPATIENT
Start: 2023-07-10 | End: 2023-07-10

## 2023-07-10 RX ORDER — TRIAMCINOLONE ACETONIDE 40 MG/ML
40 INJECTION, SUSPENSION INTRA-ARTICULAR; INTRAMUSCULAR ONCE
Status: COMPLETED | OUTPATIENT
Start: 2023-07-10 | End: 2023-07-10

## 2023-07-10 RX ORDER — MIRTAZAPINE 15 MG/1
15 TABLET, FILM COATED ORAL NIGHTLY
Qty: 90 TABLET | Refills: 1 | Status: SHIPPED | OUTPATIENT
Start: 2023-07-10

## 2023-07-10 RX ADMIN — TRIAMCINOLONE ACETONIDE 40 MG: 40 INJECTION, SUSPENSION INTRA-ARTICULAR; INTRAMUSCULAR at 10:17

## 2023-07-10 RX ADMIN — BUPIVACAINE HYDROCHLORIDE 5 MG: 2.5 INJECTION, SOLUTION INFILTRATION; PERINEURAL at 10:17

## 2023-07-10 SDOH — ECONOMIC STABILITY: FOOD INSECURITY: WITHIN THE PAST 12 MONTHS, YOU WORRIED THAT YOUR FOOD WOULD RUN OUT BEFORE YOU GOT MONEY TO BUY MORE.: NEVER TRUE

## 2023-07-10 SDOH — ECONOMIC STABILITY: INCOME INSECURITY: HOW HARD IS IT FOR YOU TO PAY FOR THE VERY BASICS LIKE FOOD, HOUSING, MEDICAL CARE, AND HEATING?: NOT HARD AT ALL

## 2023-07-10 SDOH — ECONOMIC STABILITY: HOUSING INSECURITY
IN THE LAST 12 MONTHS, WAS THERE A TIME WHEN YOU DID NOT HAVE A STEADY PLACE TO SLEEP OR SLEPT IN A SHELTER (INCLUDING NOW)?: NO

## 2023-07-10 SDOH — ECONOMIC STABILITY: FOOD INSECURITY: WITHIN THE PAST 12 MONTHS, THE FOOD YOU BOUGHT JUST DIDN'T LAST AND YOU DIDN'T HAVE MONEY TO GET MORE.: NEVER TRUE

## 2023-07-10 ASSESSMENT — PATIENT HEALTH QUESTIONNAIRE - PHQ9
6. FEELING BAD ABOUT YOURSELF - OR THAT YOU ARE A FAILURE OR HAVE LET YOURSELF OR YOUR FAMILY DOWN: 0
2. FEELING DOWN, DEPRESSED OR HOPELESS: 1
10. IF YOU CHECKED OFF ANY PROBLEMS, HOW DIFFICULT HAVE THESE PROBLEMS MADE IT FOR YOU TO DO YOUR WORK, TAKE CARE OF THINGS AT HOME, OR GET ALONG WITH OTHER PEOPLE: 0
9. THOUGHTS THAT YOU WOULD BE BETTER OFF DEAD, OR OF HURTING YOURSELF: 0
SUM OF ALL RESPONSES TO PHQ QUESTIONS 1-9: 2
8. MOVING OR SPEAKING SO SLOWLY THAT OTHER PEOPLE COULD HAVE NOTICED. OR THE OPPOSITE, BEING SO FIGETY OR RESTLESS THAT YOU HAVE BEEN MOVING AROUND A LOT MORE THAN USUAL: 0
4. FEELING TIRED OR HAVING LITTLE ENERGY: 0
5. POOR APPETITE OR OVEREATING: 0
SUM OF ALL RESPONSES TO PHQ QUESTIONS 1-9: 2
SUM OF ALL RESPONSES TO PHQ QUESTIONS 1-9: 2
SUM OF ALL RESPONSES TO PHQ9 QUESTIONS 1 & 2: 2
SUM OF ALL RESPONSES TO PHQ QUESTIONS 1-9: 2
3. TROUBLE FALLING OR STAYING ASLEEP: 0
7. TROUBLE CONCENTRATING ON THINGS, SUCH AS READING THE NEWSPAPER OR WATCHING TELEVISION: 0
1. LITTLE INTEREST OR PLEASURE IN DOING THINGS: 1

## 2023-07-10 ASSESSMENT — LIFESTYLE VARIABLES
HOW OFTEN DO YOU HAVE A DRINK CONTAINING ALCOHOL: NEVER
HOW MANY STANDARD DRINKS CONTAINING ALCOHOL DO YOU HAVE ON A TYPICAL DAY: PATIENT DOES NOT DRINK

## 2023-07-10 NOTE — PATIENT INSTRUCTIONS
time. There are many options. Some groups have a  who helps lead discussions or shares information. Others are less formal. Some meet in person, while others meet online. Try using these resources to help you find the best support group for you. Your doctor, health care team, or counselor. People with the same health concern. Your local [employer]ology, Yazidism, Mandaeism, or other Orthodoxy group. A city, state, or national group that provides support for your health concern. Check your local [address] [place] [address] or [facility] for a list of these groups. Or look for information online. Your local community, friends, and family. Supportive relationships  A supportive relationship includes emotional support such as love, trust, and understanding, as well as advice and concrete help, such as help managing your time. Reach out to others  Family and friends can help you. Ask them to:  Listen to you and give you encouragement. This can keep you from feeling hopeless or alone. Help with small daily tasks or with bigger problems. A helping hand can keep you from feeling overwhelmed. Help you manage a health problem. For example, ask them to go to doctor visits with you. Your loved ones can offer support by being involved in your medical care. Respect your relationships  A good relationship is also a two-way street. You count on help from others, but they also count on you. Know your friends' limits. You don't have to see or call your friends every day. If you are going through a rough patch, ask friends if you can contact them outside of the usual boundaries. Don't always complain or talk about yourself. Know when it's time to stop talking and listen or just enjoy your friend's company. Know that good friends can be a bad influence. For example, if a friend encourages you to drink when you know it will harm you, you may want to end the friendship. Where can you learn more?   Go to

## 2023-07-10 NOTE — PROGRESS NOTES
Medicare Annual Wellness Visit    Jd Paula is here for Medicare AWV    Assessment & Plan   Medicare annual wellness visit, subsequent  Tremor  - Not entirely typical for one diagnosis vs the other based on appearance, could be Parkinsonian but not clear. At this point she does not have other features that would also indicate Parkinson's. Anxiety seems to amplify the tremor. We discussed neurology referral for further evaluation, she would like to hold off on this for right now. Primary hypertension  - controlled, continue carvedilol 25mg twice daily, losartan 100mg daily, nifedipine 30mg daily  Weight loss  - improved, monitor  Seasonal affective disorder (HCC)  - increase mirtazapine to 15mg daily  Anxiety and depression  - will increase mirtazapine to 15mg daily and see if this improves the anxiety  Hyperlipidemia, unspecified hyperlipidemia type  - monitor    Recommendations for Preventive Services Due: see orders and patient instructions/AVS.  Recommended screening schedule for the next 5-10 years is provided to the patient in written form: see Patient Instructions/AVS.     Return in about 3 months (around 10/10/2023) for anxiety. Subjective     Tremor is still there, has not changed significantly. It is intermittent, not there all the time. She has not noted any other symptoms. When it is present, if she puts her hand on her leg it seems to amplify the tremor. She feels anxious about it so she does not want to go out or sit where people can see her. She has not noted any slowing of gait, shuffling gait, or any other changes. She is taking the mirtazapine at night and sleeps well. When she feels overwhelmed during the day she lays down in bed. Patient's complete Health Risk Assessment and screening values have been reviewed and are found in Flowsheets. The following problems were reviewed today and where indicated follow up appointments were made and/or referrals ordered.     Positive Risk Factor

## 2023-07-10 NOTE — PROGRESS NOTES
and Sports Medicine                                         Disclaimer: This note was generated with use of a verbal recognition program (DRAGON) and an attempt was made to check for errors. It is possible that there are still dictated errors within this office note. If so, please bring any significant errors to my attention for an addendum. All efforts were made to ensure that this office note is accurate.

## 2023-07-13 RX ORDER — MIRTAZAPINE 7.5 MG/1
7.5 TABLET, FILM COATED ORAL NIGHTLY
Qty: 90 TABLET | Refills: 1 | OUTPATIENT
Start: 2023-07-13

## 2023-07-17 RX ORDER — BUSPIRONE HYDROCHLORIDE 5 MG/1
TABLET ORAL
Qty: 180 TABLET | Refills: 1 | Status: SHIPPED | OUTPATIENT
Start: 2023-07-17

## 2023-10-09 ENCOUNTER — OFFICE VISIT (OUTPATIENT)
Dept: ORTHOPEDIC SURGERY | Age: 83
End: 2023-10-09
Payer: MEDICARE

## 2023-10-09 VITALS — BODY MASS INDEX: 30.82 KG/M2 | HEIGHT: 63 IN

## 2023-10-09 DIAGNOSIS — M17.12 PRIMARY OSTEOARTHRITIS OF LEFT KNEE: Primary | ICD-10-CM

## 2023-10-09 PROCEDURE — 20610 DRAIN/INJ JOINT/BURSA W/O US: CPT | Performed by: PHYSICIAN ASSISTANT

## 2023-10-09 PROCEDURE — 99999 PR OFFICE/OUTPT VISIT,PROCEDURE ONLY: CPT | Performed by: PHYSICIAN ASSISTANT

## 2023-10-09 RX ORDER — BUPIVACAINE HYDROCHLORIDE 2.5 MG/ML
2 INJECTION, SOLUTION INFILTRATION; PERINEURAL ONCE
Status: COMPLETED | OUTPATIENT
Start: 2023-10-09 | End: 2023-10-09

## 2023-10-09 RX ORDER — TRIAMCINOLONE ACETONIDE 40 MG/ML
40 INJECTION, SUSPENSION INTRA-ARTICULAR; INTRAMUSCULAR ONCE
Status: COMPLETED | OUTPATIENT
Start: 2023-10-09 | End: 2023-10-09

## 2023-10-09 RX ADMIN — TRIAMCINOLONE ACETONIDE 40 MG: 40 INJECTION, SUSPENSION INTRA-ARTICULAR; INTRAMUSCULAR at 10:18

## 2023-10-09 RX ADMIN — BUPIVACAINE HYDROCHLORIDE 5 MG: 2.5 INJECTION, SOLUTION INFILTRATION; PERINEURAL at 10:17

## 2023-10-09 NOTE — PROGRESS NOTES
Disclaimer: This note was generated with use of a verbal recognition program (DRAGON) and an attempt was made to check for errors. It is possible that there are still dictated errors within this office note. If so, please bring any significant errors to my attention for an addendum. All efforts were made to ensure that this office note is accurate.

## 2023-10-10 ENCOUNTER — OFFICE VISIT (OUTPATIENT)
Dept: INTERNAL MEDICINE CLINIC | Age: 83
End: 2023-10-10

## 2023-10-10 VITALS
SYSTOLIC BLOOD PRESSURE: 120 MMHG | WEIGHT: 175 LBS | DIASTOLIC BLOOD PRESSURE: 64 MMHG | BODY MASS INDEX: 31.01 KG/M2 | HEIGHT: 63 IN

## 2023-10-10 DIAGNOSIS — F41.9 ANXIETY: ICD-10-CM

## 2023-10-10 DIAGNOSIS — R25.1 TREMOR OF RIGHT HAND: Primary | ICD-10-CM

## 2023-10-10 RX ORDER — CARVEDILOL 25 MG/1
TABLET ORAL
Qty: 180 TABLET | Refills: 1 | Status: SHIPPED | OUTPATIENT
Start: 2023-10-10

## 2023-10-10 NOTE — PROGRESS NOTES
Vanessa Ames (:  1940) is a 82 y.o. female,Established patient, here for evaluation of the following chief complaint(s): Anxiety         ASSESSMENT/PLAN:  1. Tremor of right hand  - could be Parkinson's but not entirely typical. Have discussed neurology referral previously, she agrees with the evaluation at this point  -     Daryn Welch MD, Neurology, [facility]  2. Anxiety   - continue mirtazapine 15mg nightly, reassess in 3 mos    Return in about 3 months (around 1/10/2024) for tremor, anxiety. Subjective   SUBJECTIVE/OBJECTIVE:  HPI    Tremor seems to be getting worse. Best time is at dinner, relaxing watching TV in the evening, she does not notice it as much then. Not noticing freezing, moves quickly, sometimes she feels like she is going to not be able to stop herself from falling. She     Anxiety is about the same. She is taking the mirtazapine. She does not notice any trouble sleeping, can fall asleep during the day. Review of Systems       Objective   Physical Exam  Vitals reviewed. Constitutional:       General: She is not in acute distress. Appearance: Normal appearance. She is well-developed. HENT:      Head: Normocephalic and atraumatic. Cardiovascular:      Rate and Rhythm: Normal rate and regular rhythm. Heart sounds: Normal heart sounds. Pulmonary:      Effort: Pulmonary effort is normal. No respiratory distress. Breath sounds: Normal breath sounds. Skin:     General: Skin is warm and dry. Neurological:      Mental Status: She is alert. Comments: Intermittent tremor non-intentional tremor, comes and goes, often appears triggered by anxiety. Coarse, occasionally appears more pill-rolling. Psychiatric:         Behavior: Behavior normal.         Thought Content: Thought content normal.         Judgment: Judgment normal.                  An electronic signature was used to authenticate this note.     --Chante Spencer MD

## 2023-10-12 RX ORDER — NIFEDIPINE 30 MG/1
TABLET, FILM COATED, EXTENDED RELEASE ORAL
Qty: 90 TABLET | Refills: 1 | Status: SHIPPED | OUTPATIENT
Start: 2023-10-12

## 2023-10-13 RX ORDER — MIRTAZAPINE 15 MG/1
15 TABLET, FILM COATED ORAL
Qty: 90 TABLET | Refills: 1 | Status: SHIPPED | OUTPATIENT
Start: 2023-10-13

## 2024-01-08 RX ORDER — BUSPIRONE HYDROCHLORIDE 5 MG/1
TABLET ORAL
Qty: 180 TABLET | Refills: 1 | Status: SHIPPED | OUTPATIENT
Start: 2024-01-08

## 2024-02-12 ENCOUNTER — OFFICE VISIT (OUTPATIENT)
Dept: ORTHOPEDIC SURGERY | Age: 84
End: 2024-02-12
Payer: MEDICARE

## 2024-02-12 VITALS — BODY MASS INDEX: 29.49 KG/M2 | HEIGHT: 65 IN | WEIGHT: 177 LBS

## 2024-02-12 DIAGNOSIS — M17.12 PRIMARY OSTEOARTHRITIS OF LEFT KNEE: Primary | ICD-10-CM

## 2024-02-12 PROCEDURE — 99999 PR OFFICE/OUTPT VISIT,PROCEDURE ONLY: CPT | Performed by: PHYSICIAN ASSISTANT

## 2024-02-12 PROCEDURE — 20610 DRAIN/INJ JOINT/BURSA W/O US: CPT | Performed by: PHYSICIAN ASSISTANT

## 2024-02-12 RX ORDER — TRIAMCINOLONE ACETONIDE 40 MG/ML
40 INJECTION, SUSPENSION INTRA-ARTICULAR; INTRAMUSCULAR ONCE
Status: COMPLETED | OUTPATIENT
Start: 2024-02-12 | End: 2024-02-12

## 2024-02-12 RX ORDER — BUPIVACAINE HYDROCHLORIDE 2.5 MG/ML
2 INJECTION, SOLUTION INFILTRATION; PERINEURAL ONCE
Status: COMPLETED | OUTPATIENT
Start: 2024-02-12 | End: 2024-02-12

## 2024-02-12 RX ADMIN — BUPIVACAINE HYDROCHLORIDE 5 MG: 2.5 INJECTION, SOLUTION INFILTRATION; PERINEURAL at 09:54

## 2024-02-12 RX ADMIN — TRIAMCINOLONE ACETONIDE 40 MG: 40 INJECTION, SUSPENSION INTRA-ARTICULAR; INTRAMUSCULAR at 09:54

## 2024-02-12 NOTE — PROGRESS NOTES
with  Alcohol.   2 cc of 0.25% Marcaine and 1 cc of Kenalog 40 mg was injected into the left knee, lateral compartments without difficulty.  Procedure was tolerated well without difficulty and a band-aid was applied. Advised to use ice over the knee for 15-20 minutes to relieve any pain associated to the injection procedure.        Follow up:    Call or return to clinic if these symptoms worsen or fail to improve as anticipated.                                                 Ramiro Ramsay PA-C   Senior Physician Assistant   [facility]                                         Disclaimer:  This note was generated with use of a verbal recognition program (DRAGON) and an attempt was made to check for errors.  It is possible that there are still dictated errors within this office note.  If so, please bring any significant errors to my attention for an addendum.  All efforts were made to ensure that this office note is accurate.

## 2024-04-03 RX ORDER — CARVEDILOL 25 MG/1
TABLET ORAL
Qty: 180 TABLET | Refills: 1 | Status: SHIPPED | OUTPATIENT
Start: 2024-04-03

## 2024-04-08 RX ORDER — NIFEDIPINE 30 MG
TABLET, EXTENDED RELEASE ORAL
Qty: 90 TABLET | Refills: 0 | Status: SHIPPED | OUTPATIENT
Start: 2024-04-08

## 2024-04-08 RX ORDER — LOSARTAN POTASSIUM 100 MG/1
100 TABLET ORAL DAILY
Qty: 90 TABLET | Refills: 0 | Status: SHIPPED | OUTPATIENT
Start: 2024-04-08

## 2024-05-13 ENCOUNTER — OFFICE VISIT (OUTPATIENT)
Dept: ORTHOPEDIC SURGERY | Age: 84
End: 2024-05-13
Payer: MEDICARE

## 2024-05-13 VITALS — BODY MASS INDEX: 29.49 KG/M2 | HEIGHT: 65 IN | WEIGHT: 177 LBS

## 2024-05-13 DIAGNOSIS — M17.12 PRIMARY OSTEOARTHRITIS OF LEFT KNEE: Primary | ICD-10-CM

## 2024-05-13 PROCEDURE — 99213 OFFICE O/P EST LOW 20 MIN: CPT | Performed by: PHYSICIAN ASSISTANT

## 2024-05-13 PROCEDURE — 1123F ACP DISCUSS/DSCN MKR DOCD: CPT | Performed by: PHYSICIAN ASSISTANT

## 2024-05-13 PROCEDURE — 20610 DRAIN/INJ JOINT/BURSA W/O US: CPT | Performed by: PHYSICIAN ASSISTANT

## 2024-05-13 RX ORDER — TRIAMCINOLONE ACETONIDE 40 MG/ML
40 INJECTION, SUSPENSION INTRA-ARTICULAR; INTRAMUSCULAR ONCE
Status: COMPLETED | OUTPATIENT
Start: 2024-05-13 | End: 2024-05-13

## 2024-05-13 RX ORDER — BUPIVACAINE HYDROCHLORIDE 2.5 MG/ML
2 INJECTION, SOLUTION INFILTRATION; PERINEURAL ONCE
Status: COMPLETED | OUTPATIENT
Start: 2024-05-13 | End: 2024-05-13

## 2024-05-13 RX ADMIN — BUPIVACAINE HYDROCHLORIDE 5 MG: 2.5 INJECTION, SOLUTION INFILTRATION; PERINEURAL at 10:18

## 2024-05-13 RX ADMIN — TRIAMCINOLONE ACETONIDE 40 MG: 40 INJECTION, SUSPENSION INTRA-ARTICULAR; INTRAMUSCULAR at 10:19

## 2024-05-13 NOTE — PROGRESS NOTES
Subjective:      Patient ID: Audelia Sellers is a 83 y.o.  female.  HPI:   She is here for evaluation and treatment of left knee pain related to severe PF arthritis.   She states the previous injection which was performed 2/12/24  helped relieve the knee symptoms.    The knee pain has gradually returned.   There has not been a recent injury.   Pain is 8/10. Pain is worse with activity.   Pain improves somewhat with rest and elevation.     It has been over a year since x-rays and clinical exam has been performed for left knee arthritic complaints.      Review of Systems:   Negative for fever or chills.  Negative for numbness or tingling around the knee.    Past Medical History:   Diagnosis Date    Arthritis     Cataract     Chronic diastolic heart failure (HCC)     High blood pressure     Hyperlipidemia     stress test 01/01/2007    normal       Family History   Problem Relation Age of Onset    Cancer Other     High Blood Pressure Other        Past Surgical History:   Procedure Laterality Date    EYE SURGERY  1985    L eye - injury    JOINT REPLACEMENT      R TKR - Dr Butler       Social History     Occupational History    Not on file   Tobacco Use    Smoking status: Never    Smokeless tobacco: Never   Substance and Sexual Activity    Alcohol use: No     Alcohol/week: 0.0 standard drinks of alcohol    Drug use: No    Sexual activity: Not on file       Current Outpatient Medications   Medication Sig Dispense Refill    losartan (COZAAR) 100 MG tablet TAKE 1 TABLET BY MOUTH EVERY DAY 90 tablet 0    NIFEdipine (ADALAT CC) 30 MG extended release tablet TAKE 1 TABLET BY MOUTH EVERY DAY 90 tablet 0    carvedilol (COREG) 25 MG tablet TAKE 1 TABLET BY MOUTH TWICE A  tablet 1    busPIRone (BUSPAR) 5 MG tablet TAKE 1 TABLET BY MOUTH TWICE A  tablet 1    mirtazapine (REMERON) 15 MG tablet TAKE 1 TABLET BY MOUTH EVERY DAY AT NIGHT 90 tablet 1    ciprofloxacin (CIPRO) 500 MG tablet TAKE 1 TABLET TWICE DAILY AS NEEDED FOR

## 2024-06-18 RX ORDER — MIRTAZAPINE 15 MG/1
15 TABLET, FILM COATED ORAL
Qty: 90 TABLET | Refills: 1 | Status: SHIPPED | OUTPATIENT
Start: 2024-06-18

## 2024-07-08 RX ORDER — LOSARTAN POTASSIUM 100 MG/1
100 TABLET ORAL DAILY
Qty: 30 TABLET | Refills: 0 | Status: SHIPPED | OUTPATIENT
Start: 2024-07-08

## 2024-07-08 RX ORDER — BUSPIRONE HYDROCHLORIDE 5 MG/1
TABLET ORAL
Qty: 60 TABLET | Refills: 0 | Status: SHIPPED | OUTPATIENT
Start: 2024-07-08

## 2024-07-08 RX ORDER — NIFEDIPINE 30 MG
TABLET, EXTENDED RELEASE ORAL
Qty: 30 TABLET | Refills: 0 | Status: SHIPPED | OUTPATIENT
Start: 2024-07-08

## 2024-07-25 ENCOUNTER — APPOINTMENT (OUTPATIENT)
Dept: GENERAL RADIOLOGY | Age: 84
DRG: 057 | End: 2024-07-25
Payer: MEDICARE

## 2024-07-25 ENCOUNTER — APPOINTMENT (OUTPATIENT)
Dept: CT IMAGING | Age: 84
DRG: 057 | End: 2024-07-25
Payer: MEDICARE

## 2024-07-25 ENCOUNTER — HOSPITAL ENCOUNTER (EMERGENCY)
Age: 84
Discharge: HOME | DRG: 057 | End: 2024-07-25
Attending: EMERGENCY MEDICINE
Payer: MEDICARE

## 2024-07-25 VITALS
HEART RATE: 72 BPM | BODY MASS INDEX: 29.53 KG/M2 | TEMPERATURE: 98.1 F | RESPIRATION RATE: 14 BRPM | SYSTOLIC BLOOD PRESSURE: 180 MMHG | WEIGHT: 177.47 LBS | DIASTOLIC BLOOD PRESSURE: 69 MMHG | OXYGEN SATURATION: 97 %
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
## 2024-07-25 PROCEDURE — 71111 X-RAY EXAM RIBS/CHEST4/> VWS: CPT

## 2024-07-25 PROCEDURE — 70450 CT HEAD/BRAIN W/O DYE: CPT

## 2024-07-25 PROCEDURE — 6370000000 HC RX 637 (ALT 250 FOR IP)

## 2024-07-25 PROCEDURE — 72125 CT NECK SPINE W/O DYE: CPT

## 2024-07-25 PROCEDURE — 99284 EMERGENCY DEPT VISIT MOD MDM: CPT

## 2024-07-25 RX ORDER — CARVEDILOL 12.5 MG/1
25 TABLET ORAL 2 TIMES DAILY WITH MEALS
Status: DISCONTINUED | OUTPATIENT
Start: 2024-07-25 | End: 2024-07-25 | Stop reason: HOSPADM

## 2024-07-25 RX ORDER — LIDOCAINE 50 MG/G
1 PATCH TOPICAL DAILY
Qty: 10 PATCH | Refills: 0 | Status: ON HOLD | OUTPATIENT
Start: 2024-07-25 | End: 2024-08-04

## 2024-07-25 RX ORDER — LIDOCAINE 4 G/G
1 PATCH TOPICAL ONCE
Status: DISCONTINUED | OUTPATIENT
Start: 2024-07-25 | End: 2024-07-25 | Stop reason: HOSPADM

## 2024-07-25 RX ORDER — ACETAMINOPHEN 325 MG/1
650 TABLET ORAL ONCE
Status: COMPLETED | OUTPATIENT
Start: 2024-07-25 | End: 2024-07-25

## 2024-07-25 RX ORDER — NIFEDIPINE 30 MG/1
30 TABLET, EXTENDED RELEASE ORAL ONCE
Status: DISCONTINUED | OUTPATIENT
Start: 2024-07-25 | End: 2024-07-25 | Stop reason: HOSPADM

## 2024-07-25 RX ADMIN — CARVEDILOL 25 MG: 12.5 TABLET, FILM COATED ORAL at 11:51

## 2024-07-25 RX ADMIN — ACETAMINOPHEN 325MG 650 MG: 325 TABLET ORAL at 11:51

## 2024-07-25 ASSESSMENT — PAIN DESCRIPTION - ORIENTATION: ORIENTATION: RIGHT

## 2024-07-25 ASSESSMENT — PAIN DESCRIPTION - LOCATION
LOCATION: RIB CAGE
LOCATION: RIB CAGE

## 2024-07-25 ASSESSMENT — LIFESTYLE VARIABLES
HOW MANY STANDARD DRINKS CONTAINING ALCOHOL DO YOU HAVE ON A TYPICAL DAY: PATIENT DECLINED
HOW OFTEN DO YOU HAVE A DRINK CONTAINING ALCOHOL: PATIENT DECLINED

## 2024-07-25 ASSESSMENT — PAIN - FUNCTIONAL ASSESSMENT: PAIN_FUNCTIONAL_ASSESSMENT: 0-10

## 2024-07-25 ASSESSMENT — PAIN SCALES - GENERAL
PAINLEVEL_OUTOF10: 7
PAINLEVEL_OUTOF10: 7

## 2024-07-25 NOTE — ED PROVIDER NOTES
ED Attending Attestation Note    I personally saw the patient and made/approved the management plan and take responsibility for patient management.     Briefly, 84 y.o. female presents with fall.  Patient reports that she lost her footing and fell from standing.  She reports chest wall pain where she hit the ground but has no complaints otherwise.        Imaging:   XR RIBS BILATERAL (MIN 4 VIEWS)   Final Result   No acute cardio pulmonary findings.  No acute fracture         CT HEAD WO CONTRAST   Final Result   No acute intracranial abnormality.         CT CERVICAL SPINE WO CONTRAST   Final Result   1. No acute finding of the cervical spine.   2. 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              MDM:   Plan for imaging as indicated.  Likely discharge      CRITICAL CARE  None     For further details of the patient's emergency department visit, please see the advanced practice provider's documentation.    Danny Blari MD     This report has been produced using speech recognition software and may contain errors related to that system including errors in grammar, punctuation, and spelling, as well as words and phrases that may be inappropriate. If there are any questions or concerns please feel free to contact the dictating provider for clarification.       Danny Blair MD  07/25/24 [id number]

## 2024-07-25 NOTE — ED PROVIDER NOTES
[facility] EMERGENCY DEPARTMENT  EMERGENCY DEPARTMENT ENCOUNTER        Pt Name: Audelia Sellers  MRN: [id number]  Birthdate 1940  Date of evaluation: 7/25/2024  Provider: Charlee Middleton PA-C  PCP: Kaci Montero MD  Note Started: 4:30 PM EDT 7/25/24       I have seen and evaluated this patient with my supervising physician Johnnie Cui    CHIEF COMPLAINT       Chief Complaint   Patient presents with    Fall     Pt to ed via [facility] from home for a witnessed fall. Hx of parkinson's.        HISTORY OF PRESENT ILLNESS: 1 or more Elements     History From: Patient            Chief Complaint: Fall    Audelia Sellers is a 84 y.o. female with a history of Parkinson's who presents to the ED with a concern of a mechanical fall that happened after she lost her footing and fell from a standing, patient said that she fall backwards.  Denies headaches, dizziness, blurry vision, chest pain, shortness of breath, nausea, vomiting, fevers, abdominal pain.  No pain at shoulders, elbows, wrist, hips and knees bilaterally.  She said having some  pain and a bruise on her right breast, she takes aspirin, no other blood thinners.    Nursing Notes were all reviewed and agreed with or any disagreements were addressed in the HPI.    REVIEW OF SYSTEMS :      Review of Systems    Positives and Pertinent negatives as per HPI.     SURGICAL HISTORY     Past Surgical History:   Procedure Laterality Date    EYE SURGERY  1985    L eye - injury    JOINT REPLACEMENT      R TKR - Dr Butler       CURRENTMEDICATIONS       Discharge Medication List as of 7/25/2024 12:24 PM        CONTINUE these medications which have NOT CHANGED    Details   losartan (COZAAR) 100 MG tablet TAKE 1 TABLET BY MOUTH EVERY DAY, Disp-30 tablet, R-0NEED TO ESTABLISH CARE WITH NEW PROVIDERNormal      NIFEdipine (ADALAT CC) 30 MG extended release tablet TAKE 1 TABLET BY MOUTH EVERY DAY, Disp-30 tablet, R-0NEED TO ESTABLISH CARE WITH NEW PROVIDERNormal  alert and oriented in the ED, hemodynamically stable.  It was noted that she had a elevated blood pressure, patient denies taking medications this morning, she originally agreed to taking medications in the ED and carvedilol 25 mg were given, but then she mentioned that she prefers to take medication at home.  While in the ED she denied chest pain, shortness of breath, headaches, dizziness, blurry vision.   She requested food and juice in the ED, she tolerated with no issues.    I think it is reasonable to discharge patient home with close follow-up    The patient is at low risk for mortality based on demographic, history and clinical factors. Given the best available information and clinical assessment, I estimate the risk of hospitalization to be greater than risk of treatment at home. I have explained to the patient that the risk could rapidly change, given precautions for return and instructions. Explained to patient that the risk for mortality is low based on demographic, history and clinical factors.     Patient discharged home in stable condition, no acute distress, the airway was not compromised, non-tachycardic, afebrile, and able to ambulate in the ED.    The patient tolerated their visit well.  The patient and / or the family were informed of the results of any tests, a time was given to answer questions, a plan was proposed and they agreed with plan.    I am the Primary Clinician of Record.  FINAL IMPRESSION      1. Fall, initial encounter          DISPOSITION/PLAN     DISPOSITION Decision To Discharge 07/25/2024 12:26:02 PM      PATIENT REFERRED TO:  Kaci Montero MD  [address]  [facility] [address]  [phone]    In 1 week      [facility] Emergency Department  [address] [facility] [address]  [phone]    If symptoms worsen      DISCHARGE MEDICATIONS:  Discharge Medication List as of 7/25/2024 12:24 PM        START taking these medications    Details

## 2024-07-25 NOTE — ED TRIAGE NOTES
Pt arrived via [facility] from home for c/o witnessed fall by . Pt has a skinned up nose, and chin, large bruise on her right breast, States she has right rib pain. Pt does take a blood thinner but hasn't had her medication this morning. Pt awake, alert, and oriented x3. Skin warm, dry, and color appropriate for ethnicity. Respirations easy and unlabored. Pt placed in gown and on cardiac monitor. Call light within reach. Pt denies further needs at this time.

## 2024-07-25 NOTE — DISCHARGE INSTRUCTIONS
Audelia,     Thank you for choosing [facility], imaging showed no abnormalities.  Lidocaine patches were prescribed, please avoid using heat with the patches on the skin since it can cause burning to the area.  You may take Tylenol as needed.    Please follow-up with your PCP within the week for reassessment after ED visit.    Return to the ED if you experience new or worsening symptoms, please be aware that your blood pressure was elevated, sure you take your medication as prescribed, return if you experience chest pain, shortness of breath, headaches, dizziness, blurry vision or any new or worsening of symptoms.

## 2024-07-27 ENCOUNTER — APPOINTMENT (OUTPATIENT)
Dept: GENERAL RADIOLOGY | Age: 84
DRG: 057 | End: 2024-07-27
Payer: MEDICARE

## 2024-07-27 ENCOUNTER — HOSPITAL ENCOUNTER (INPATIENT)
Age: 84
LOS: 6 days | Discharge: NURSING, REHAB OR LONG-TERM CARE | DRG: 057 | End: 2024-08-02
Attending: STUDENT IN AN ORGANIZED HEALTH CARE EDUCATION/TRAINING PROGRAM | Admitting: STUDENT IN AN ORGANIZED HEALTH CARE EDUCATION/TRAINING PROGRAM
Payer: MEDICARE

## 2024-07-27 DIAGNOSIS — R79.89 ELEVATED TROPONIN: ICD-10-CM

## 2024-07-27 DIAGNOSIS — E83.42 HYPOMAGNESEMIA: ICD-10-CM

## 2024-07-27 DIAGNOSIS — R79.89 ELEVATED BRAIN NATRIURETIC PEPTIDE (BNP) LEVEL: ICD-10-CM

## 2024-07-27 DIAGNOSIS — R41.82 ALTERED MENTAL STATUS, UNSPECIFIED ALTERED MENTAL STATUS TYPE: ICD-10-CM

## 2024-07-27 DIAGNOSIS — R53.1 GENERALIZED WEAKNESS: Primary | ICD-10-CM

## 2024-07-27 LAB
ALBUMIN SERPL-MCNC: 3.7 G/DL (ref 3.4–5)
ALBUMIN/GLOB SERPL: 1.3 {RATIO} (ref 1.1–2.2)
ALP SERPL-CCNC: 79 U/L (ref 40–129)
ALT SERPL-CCNC: <5 U/L (ref 10–40)
ANION GAP SERPL CALCULATED.3IONS-SCNC: 13 MMOL/L (ref 3–16)
AST SERPL-CCNC: 9 U/L (ref 15–37)
BACTERIA URNS QL MICRO: NORMAL /HPF
BASE EXCESS BLDV CALC-SCNC: 4.9 MMOL/L
BASOPHILS # BLD: 0 K/UL (ref 0–0.2)
BASOPHILS NFR BLD: 0.4 %
BILIRUB DIRECT SERPL-MCNC: 0.7 MG/DL (ref 0–0.3)
BILIRUB INDIRECT SERPL-MCNC: 2 MG/DL (ref 0–1)
BILIRUB SERPL-MCNC: 2.7 MG/DL (ref 0–1)
BILIRUB SERPL-MCNC: 3 MG/DL (ref 0–1)
BILIRUB UR QL STRIP.AUTO: ABNORMAL
BUN SERPL-MCNC: 11 MG/DL (ref 7–20)
CALCIUM SERPL-MCNC: 9 MG/DL (ref 8.3–10.6)
CHLORIDE SERPL-SCNC: 93 MMOL/L (ref 99–110)
CLARITY UR: CLEAR
CO2 BLDV-SCNC: 30 MMOL/L
CO2 SERPL-SCNC: 24 MMOL/L (ref 21–32)
COHGB MFR BLDV: 1.9 %
COLOR UR: ABNORMAL
CREAT SERPL-MCNC: <0.5 MG/DL (ref 0.6–1.2)
DEPRECATED RDW RBC AUTO: 14.1 % (ref 12.4–15.4)
EOSINOPHIL # BLD: 0.1 K/UL (ref 0–0.6)
EOSINOPHIL NFR BLD: 0.8 %
EPI CELLS #/AREA URNS AUTO: 0 /HPF (ref 0–5)
GFR SERPLBLD CREATININE-BSD FMLA CKD-EPI: >90 ML/MIN/{1.73_M2}
GLUCOSE SERPL-MCNC: 95 MG/DL (ref 70–99)
GLUCOSE UR STRIP.AUTO-MCNC: NEGATIVE MG/DL
HCO3 BLDV-SCNC: 29 MMOL/L (ref 23–29)
HCT VFR BLD AUTO: 40.4 % (ref 36–48)
HGB BLD-MCNC: 14.2 G/DL (ref 12–16)
HGB UR QL STRIP.AUTO: NEGATIVE
HYALINE CASTS #/AREA URNS AUTO: 0 /LPF (ref 0–8)
KETONES UR STRIP.AUTO-MCNC: 15 MG/DL
LACTATE BLDV-SCNC: 1.1 MMOL/L (ref 0.4–1.9)
LACTATE BLDV-SCNC: 1.2 MMOL/L (ref 0.4–1.9)
LACTATE BLDV-SCNC: 1.2 MMOL/L (ref 0.4–2)
LEUKOCYTE ESTERASE UR QL STRIP.AUTO: ABNORMAL
LYMPHOCYTES # BLD: 1 K/UL (ref 1–5.1)
LYMPHOCYTES NFR BLD: 14.7 %
MAGNESIUM SERPL-MCNC: 1.7 MG/DL (ref 1.8–2.4)
MCH RBC QN AUTO: 31.2 PG (ref 26–34)
MCHC RBC AUTO-ENTMCNC: 35.3 G/DL (ref 31–36)
MCV RBC AUTO: 88.5 FL (ref 80–100)
METHGB MFR BLDV: 0.4 %
MONOCYTES # BLD: 0.5 K/UL (ref 0–1.3)
MONOCYTES NFR BLD: 8 %
NEUTROPHILS # BLD: 5 K/UL (ref 1.7–7.7)
NEUTROPHILS NFR BLD: 76.1 %
NITRITE UR QL STRIP.AUTO: NEGATIVE
NT-PROBNP SERPL-MCNC: 2566 PG/ML (ref 0–449)
O2 THERAPY: ABNORMAL
PCO2 BLDV: 40.3 MMHG (ref 40–50)
PH BLDV: 7.47 [PH] (ref 7.35–7.45)
PH UR STRIP.AUTO: 7 [PH] (ref 5–8)
PLATELET # BLD AUTO: 183 K/UL (ref 135–450)
PMV BLD AUTO: 9 FL (ref 5–10.5)
PO2 BLDV: 43 MMHG
POTASSIUM SERPL-SCNC: 3.5 MMOL/L (ref 3.5–5.1)
PROT SERPL-MCNC: 6.5 G/DL (ref 6.4–8.2)
PROT UR STRIP.AUTO-MCNC: 30 MG/DL
RBC # BLD AUTO: 4.56 M/UL (ref 4–5.2)
RBC CLUMPS #/AREA URNS AUTO: 1 /HPF (ref 0–4)
SAO2 % BLDV: 82 %
SODIUM SERPL-SCNC: 130 MMOL/L (ref 136–145)
SP GR UR STRIP.AUTO: 1.02 (ref 1–1.03)
TROPONIN, HIGH SENSITIVITY: 21 NG/L (ref 0–14)
TROPONIN, HIGH SENSITIVITY: 31 NG/L (ref 0–14)
UA COMPLETE W REFLEX CULTURE PNL UR: ABNORMAL
UA DIPSTICK W REFLEX MICRO PNL UR: YES
URN SPEC COLLECT METH UR: ABNORMAL
UROBILINOGEN UR STRIP-ACNC: >=8 E.U./DL
WBC # BLD AUTO: 6.5 K/UL (ref 4–11)
WBC #/AREA URNS AUTO: 1 /HPF (ref 0–5)

## 2024-07-27 PROCEDURE — 71045 X-RAY EXAM CHEST 1 VIEW: CPT

## 2024-07-27 PROCEDURE — 1200000000 HC SEMI PRIVATE

## 2024-07-27 PROCEDURE — 36415 COLL VENOUS BLD VENIPUNCTURE: CPT

## 2024-07-27 PROCEDURE — 87040 BLOOD CULTURE FOR BACTERIA: CPT

## 2024-07-27 PROCEDURE — 96360 HYDRATION IV INFUSION INIT: CPT

## 2024-07-27 PROCEDURE — 82248 BILIRUBIN DIRECT: CPT

## 2024-07-27 PROCEDURE — 6360000002 HC RX W HCPCS: Performed by: STUDENT IN AN ORGANIZED HEALTH CARE EDUCATION/TRAINING PROGRAM

## 2024-07-27 PROCEDURE — 81001 URINALYSIS AUTO W/SCOPE: CPT

## 2024-07-27 PROCEDURE — 2580000003 HC RX 258: Performed by: STUDENT IN AN ORGANIZED HEALTH CARE EDUCATION/TRAINING PROGRAM

## 2024-07-27 PROCEDURE — 83735 ASSAY OF MAGNESIUM: CPT

## 2024-07-27 PROCEDURE — 83880 ASSAY OF NATRIURETIC PEPTIDE: CPT

## 2024-07-27 PROCEDURE — 82803 BLOOD GASES ANY COMBINATION: CPT

## 2024-07-27 PROCEDURE — 99285 EMERGENCY DEPT VISIT HI MDM: CPT

## 2024-07-27 PROCEDURE — 83605 ASSAY OF LACTIC ACID: CPT

## 2024-07-27 PROCEDURE — 93005 ELECTROCARDIOGRAM TRACING: CPT | Performed by: NURSE PRACTITIONER

## 2024-07-27 PROCEDURE — 80053 COMPREHEN METABOLIC PANEL: CPT

## 2024-07-27 PROCEDURE — 82247 BILIRUBIN TOTAL: CPT

## 2024-07-27 PROCEDURE — 84484 ASSAY OF TROPONIN QUANT: CPT

## 2024-07-27 PROCEDURE — 85025 COMPLETE CBC W/AUTO DIFF WBC: CPT

## 2024-07-27 PROCEDURE — 2580000003 HC RX 258: Performed by: NURSE PRACTITIONER

## 2024-07-27 RX ORDER — SODIUM CHLORIDE 0.9 % (FLUSH) 0.9 %
5-40 SYRINGE (ML) INJECTION EVERY 12 HOURS SCHEDULED
Status: DISCONTINUED | OUTPATIENT
Start: 2024-07-27 | End: 2024-08-02 | Stop reason: HOSPADM

## 2024-07-27 RX ORDER — LOSARTAN POTASSIUM 100 MG/1
100 TABLET ORAL DAILY
Status: DISCONTINUED | OUTPATIENT
Start: 2024-07-28 | End: 2024-08-02 | Stop reason: HOSPADM

## 2024-07-27 RX ORDER — 0.9 % SODIUM CHLORIDE 0.9 %
30 INTRAVENOUS SOLUTION INTRAVENOUS ONCE
Status: COMPLETED | OUTPATIENT
Start: 2024-07-27 | End: 2024-07-27

## 2024-07-27 RX ORDER — MAGNESIUM SULFATE IN WATER 40 MG/ML
2000 INJECTION, SOLUTION INTRAVENOUS PRN
Status: DISCONTINUED | OUTPATIENT
Start: 2024-07-27 | End: 2024-07-27 | Stop reason: SDUPTHER

## 2024-07-27 RX ORDER — MAGNESIUM SULFATE IN WATER 40 MG/ML
2000 INJECTION, SOLUTION INTRAVENOUS PRN
Status: DISCONTINUED | OUTPATIENT
Start: 2024-07-27 | End: 2024-08-02 | Stop reason: HOSPADM

## 2024-07-27 RX ORDER — POTASSIUM CHLORIDE 20 MEQ/1
40 TABLET, EXTENDED RELEASE ORAL PRN
Status: DISCONTINUED | OUTPATIENT
Start: 2024-07-27 | End: 2024-08-02 | Stop reason: HOSPADM

## 2024-07-27 RX ORDER — ONDANSETRON 4 MG/1
4 TABLET, ORALLY DISINTEGRATING ORAL EVERY 8 HOURS PRN
Status: DISCONTINUED | OUTPATIENT
Start: 2024-07-27 | End: 2024-08-02 | Stop reason: HOSPADM

## 2024-07-27 RX ORDER — ACETAMINOPHEN 325 MG/1
650 TABLET ORAL EVERY 6 HOURS PRN
Status: DISCONTINUED | OUTPATIENT
Start: 2024-07-27 | End: 2024-08-02 | Stop reason: HOSPADM

## 2024-07-27 RX ORDER — METRONIDAZOLE 500 MG/100ML
500 INJECTION, SOLUTION INTRAVENOUS EVERY 8 HOURS
Status: DISCONTINUED | OUTPATIENT
Start: 2024-07-27 | End: 2024-08-01

## 2024-07-27 RX ORDER — ASPIRIN 81 MG/1
81 TABLET, CHEWABLE ORAL DAILY
Status: DISCONTINUED | OUTPATIENT
Start: 2024-07-28 | End: 2024-08-02 | Stop reason: HOSPADM

## 2024-07-27 RX ORDER — POTASSIUM CHLORIDE 7.45 MG/ML
10 INJECTION INTRAVENOUS PRN
Status: DISCONTINUED | OUTPATIENT
Start: 2024-07-27 | End: 2024-08-02 | Stop reason: HOSPADM

## 2024-07-27 RX ORDER — ENOXAPARIN SODIUM 100 MG/ML
40 INJECTION SUBCUTANEOUS DAILY
Status: DISCONTINUED | OUTPATIENT
Start: 2024-07-28 | End: 2024-08-02 | Stop reason: HOSPADM

## 2024-07-27 RX ORDER — ONDANSETRON 2 MG/ML
4 INJECTION INTRAMUSCULAR; INTRAVENOUS EVERY 6 HOURS PRN
Status: DISCONTINUED | OUTPATIENT
Start: 2024-07-27 | End: 2024-08-02 | Stop reason: HOSPADM

## 2024-07-27 RX ORDER — SIMVASTATIN 10 MG
5 TABLET ORAL NIGHTLY
Status: DISCONTINUED | OUTPATIENT
Start: 2024-07-28 | End: 2024-08-02 | Stop reason: HOSPADM

## 2024-07-27 RX ORDER — NIFEDIPINE 30 MG/1
30 TABLET, EXTENDED RELEASE ORAL DAILY
Status: DISCONTINUED | OUTPATIENT
Start: 2024-07-28 | End: 2024-08-02 | Stop reason: HOSPADM

## 2024-07-27 RX ORDER — BUSPIRONE HYDROCHLORIDE 5 MG/1
5 TABLET ORAL 2 TIMES DAILY
Status: DISCONTINUED | OUTPATIENT
Start: 2024-07-27 | End: 2024-08-02 | Stop reason: HOSPADM

## 2024-07-27 RX ORDER — ACETAMINOPHEN 650 MG/1
650 SUPPOSITORY RECTAL EVERY 6 HOURS PRN
Status: DISCONTINUED | OUTPATIENT
Start: 2024-07-27 | End: 2024-08-02 | Stop reason: HOSPADM

## 2024-07-27 RX ORDER — SIMVASTATIN 5 MG
5 TABLET ORAL NIGHTLY
COMMUNITY

## 2024-07-27 RX ORDER — SODIUM CHLORIDE 9 MG/ML
INJECTION, SOLUTION INTRAVENOUS PRN
Status: DISCONTINUED | OUTPATIENT
Start: 2024-07-27 | End: 2024-08-02 | Stop reason: HOSPADM

## 2024-07-27 RX ORDER — ATORVASTATIN CALCIUM 20 MG/1
20 TABLET, FILM COATED ORAL DAILY
Status: DISCONTINUED | OUTPATIENT
Start: 2024-07-27 | End: 2024-07-27

## 2024-07-27 RX ORDER — SODIUM CHLORIDE 0.9 % (FLUSH) 0.9 %
5-40 SYRINGE (ML) INJECTION PRN
Status: DISCONTINUED | OUTPATIENT
Start: 2024-07-27 | End: 2024-08-02 | Stop reason: HOSPADM

## 2024-07-27 RX ORDER — POLYETHYLENE GLYCOL 3350 17 G/17G
17 POWDER, FOR SOLUTION ORAL DAILY PRN
Status: DISCONTINUED | OUTPATIENT
Start: 2024-07-27 | End: 2024-08-02 | Stop reason: HOSPADM

## 2024-07-27 RX ADMIN — METRONIDAZOLE 500 MG: 500 INJECTION, SOLUTION INTRAVENOUS at 22:22

## 2024-07-27 RX ADMIN — SODIUM CHLORIDE 1710 ML: 9 INJECTION, SOLUTION INTRAVENOUS at 16:15

## 2024-07-27 RX ADMIN — WATER 1000 MG: 1 INJECTION INTRAMUSCULAR; INTRAVENOUS; SUBCUTANEOUS at 22:17

## 2024-07-27 RX ADMIN — SODIUM CHLORIDE, PRESERVATIVE FREE 10 ML: 5 INJECTION INTRAVENOUS at 22:16

## 2024-07-27 ASSESSMENT — PAIN SCALES - GENERAL: PAINLEVEL_OUTOF10: 0

## 2024-07-27 ASSESSMENT — PAIN - FUNCTIONAL ASSESSMENT: PAIN_FUNCTIONAL_ASSESSMENT: 0-10

## 2024-07-27 NOTE — ED TRIAGE NOTES
Pt from home via EMS. EMS states  said that she is weak today and couldn't get out of bed. Pt was recently seen in hospital for witnessed fall. Denies LOC, chest pain, n/v, headache. Pt is on blood thinners. Hx of parkinson's, HTN, HF.  states he gave her morning medications but could not get her out of bed to eat today.

## 2024-07-27 NOTE — CONSULTS
Consult Note     Patient: Audelia Sellers MRN: [id number]   YOB: 1940 Age: 84 y.o. Sex: female   Unit: [facility] EMERGENCY DEPT Room/Bed: 008/B-08 Location: [facility]    Admitting Physician: LISSETH CAMARENA    Primary Care Physician: Kaci Montero MD   Admission Date: 7/27/2024   Consult Date: 7/27/2024     Assessment/Plan:    Hyperbilirubinemia, chronic, consistent with benign conjugation defect or gilbert's  Altered mental status/weakness and recent fall  Dementia  Parkinson's disease  Hypertension    Discussion: The patient's presentation is consistent with a benign conjugation defect or Gilbert's.  Her hyperbilirubinemia mainly indirect; her direct is marginally abnormal and may be higher than I would expect with Gilbert's.  However, the patient's elevated bilirubin dates back as far as I can research to 2012 and though it has not been as high as 3.0 and has been as high as 2.4.  It has never been associated with other LFT abnormalities.  I doubt cholecystitis.    1.  Agree with right upper quadrant ultrasound  2.  Consider discontinuing antibiotics  3.  I will sign off; please call with questions or problems        HPI: History is obtained from the chart.  The patient is not able to provide reliable history.  For her part she denies any abdominal pain or for that matter any GI symptoms.  She was brought to the emergency room several days ago following a fall and then yesterday for difficulty getting out of bed weakness and change in mental status.  Her  reported that she has had a change over the last week.    She has a history of intermittent UTIs and has been on antibiotic prophylaxis.  Her evaluation in the emergency room was remarkable for an elevated bilirubin at 3.  Her other LFTs were normal.    I reviewed her chart over the years and she has had a consistently elevated bilirubin anywhere from 1.5-2.4.  Her other LFTs have been consistently normal.  CT abdomen 2013  showed decreased attenuation of the liver.      Past Medical History:   Diagnosis Date    Arthritis     Cataract     Chronic diastolic heart failure (HCC)     High blood pressure     Hyperlipidemia     stress test 01/01/2007    normal     Past Surgical History:   Procedure Laterality Date    EYE SURGERY  1985    L eye - injury    JOINT REPLACEMENT      R TKR - Dr Butler     Allergies   Allergen Reactions    Codeine Rash    Sulfa Antibiotics Anaphylaxis and Other (See Comments)    Atorvastatin      Reaction: Increases LFT's      Prior to Admission medications    Medication Sig Start Date End Date Taking? Authorizing Provider   simvastatin (ZOCOR) 5 MG tablet Take 1 tablet by mouth nightly   Yes ProviderSavanah MD   carbidopa-levodopa (SINEMET)  MG per tablet Take 3 tablets by mouth 4 times daily   Yes ProviderSavanah MD   lidocaine (LIDODERM) 5 % Place 1 patch onto the skin daily for 10 days 12 hours on, 12 hours off. 7/25/24 8/4/24  Charlee Middleton PA-C   losartan (COZAAR) 100 MG tablet TAKE 1 TABLET BY MOUTH EVERY DAY 7/8/24   Mariana Sebastian MD   NIFEdipine (ADALAT CC) 30 MG extended release tablet TAKE 1 TABLET BY MOUTH EVERY DAY  Patient taking differently: Take 1 tablet by mouth daily 7/8/24   Mariana Sebastian MD   busPIRone (BUSPAR) 5 MG tablet TAKE 1 TABLET BY MOUTH TWICE A DAY  Patient taking differently: Take 1 tablet by mouth 2 times daily 7/8/24   Mariana Sebastian MD   mirtazapine (REMERON) 15 MG tablet TAKE 1 TABLET BY MOUTH EVERY DAY AT NIGHT 6/18/24   Kaci Montero MD   carvedilol (COREG) 25 MG tablet TAKE 1 TABLET BY MOUTH TWICE A DAY  Patient taking differently: Take 1 tablet by mouth 2 times daily (with meals) TAKE 1 TABLET BY MOUTH TWICE A DAY 4/3/24   Kaci Montero MD   ciprofloxacin (CIPRO) 500 MG tablet TAKE 1 TABLET TWICE DAILY AS NEEDED FOR UTI 4/27/23   Shiva Floyd MD   hydrALAZINE (APRESOLINE) 25 MG tablet Take 1 tablet by mouth 3 times daily 9/16/22 9/16/22

## 2024-07-27 NOTE — ED NOTES
Chemistry Marissa states they had lost UA but now have found it and is in chemistry hands they are running it now

## 2024-07-27 NOTE — H&P
V2.0  History and Physical      Name:  Audelia Sellers /Age/Sex: 1940  (84 y.o. female)   MRN & CSN:  [id number] & [id number] Encounter Date/Time: 2024 6:44 PM EDT   Location:   PCP: Kaci Montero MD       Hospital Day: 1    Assessment and Plan:   Audelia Sellers is a 84 y.o. female with a pmh of advanced dementia hypertension mood disorder hyperlipidemia Parkinson's disease who presents with Generalized weakness    Hospital Problems             Last Modified POA    * (Principal) Generalized weakness 2024 Yes       Generalized weakness and altered mental status in the setting of concerns for underlying suspected UTI urine cultures are pending de-escalate antibiotic as per culture results previous history of multiple UTIs with chronic suppressive ciprofloxacin on board more than 3 infections the last 6 months in terms of UTI.  Hyperbilirubinemia assess for conjugated versus unconjugated.  GI consulted added Flagyl for cholecystitis concerns,.  Ultrasound has been ordered for the abdomen to rule out cholecystitis and choledocholithiasis.  Parkinson disease continue levodopa carbidopa.  UTI can worsen the underlying on phenomena continue to monitor  Advanced dementia continue to monitor  Benign essential hypertension continue home medication  Hyperlipidemia on statin       Medical Decision Making:  The following items were considered in medical decision making:  Discussion of patient care with other providers  Reviewed clinical lab tests if any  Reviewed radiology tests if any  Reviewed other diagnostic tests/interventions  Independent review of radiologic images if any  Microbiology cultures and other micro tests if any    Estimated time spent for medical decision-making encompassing complexity of the case, history taking, medication review, physical examination, communication with family, RN, , discussion with specialists, and ancillary staff members to provide accurate care for the

## 2024-07-27 NOTE — ED NOTES
Called lab and spoke w/ microbiology to check on status of UA to culture results; was informed that they aren't finding it at the moment despite the hot seat receiving @ 1700 and preliminary result reporting out @ 1705. Microbiology placed this RN on hold to go check in another location for the sample. RN awaiting response.     Microbiology is putting this RN over to chemistry to see if they have sample. Speaking to chemistry now and they are checking on UA status and to make sure they have sample. Awaiting response this RN is on hold

## 2024-07-27 NOTE — PROGRESS NOTES
Medication Reconciliation     List of medications patient is currently taking is complete.     Source of information: 1. Conversation with patient and  at bedside                                      2. EPIC records      Allergies  Codeine, Sulfa antibiotics, and Atorvastatin     Notes regarding home medications:  1.  confirms patient took her morning doses of all home medications except Sinemet prior to arrival in the ED  2. Follows with Dr. Kumar at [facility]- taking Sinemet  3 tabs four times daily.  states some days she refuses to take.    Sheila Clark, Pharmacy Intern  7/27/2024 6:36 PM

## 2024-07-27 NOTE — ED PROVIDER NOTES
[facility] EMERGENCY DEPARTMENT  EMERGENCY DEPARTMENT ENCOUNTER        Pt Name: Audelia Sellers  MRN: [id number]  Birthdate 1940  Date of evaluation: 7/27/2024  Provider: GWYN Ortiz - LARRY  PCP: Kaci Montero MD  Note Started: 3:07 PM EDT 7/27/24      BARBARA. I have evaluated this patient.        CHIEF COMPLAINT       Chief Complaint   Patient presents with    Fatigue     Pt from home via EMS. EMS states  said that she is weak today and couldn't get out of bed. Pt was recently seen in hospital for witnessed fall. Denies LOC, chest pain, n/v, headache. Pt is on blood thinners. Hx of parkinson's, HTN, HF.  states he gave her morning medications but did not eat today since she could not get out of bed.        HISTORY OF PRESENT ILLNESS: 1 or more Elements     History from : Patient and Family , , Hero    Limitations to history : Altered Mental Status    Audelia Sellers is a 84 y.o. female with PMH notable for HTN, HLD, and CHF who presents to the emergency department today via EMS from home complaining of weakness.  Patient's  is the primary historian.  Patient is alert and answering questions.  She is oriented to person and place.  She denies any complaints.  Patient's  advised that for the last week she has had increased altered mental status and weakness.  She has not eaten or drinking anything since yesterday.  No vomiting or diarrhea.  Patient denies chest pain.  She denies headaches.   advised that 2 days ago she did have a fall, but it was a fall in which he lowered her to the ground.  She was seen in the emergency department at that time and had a negative workup including CT head.    Nursing Notes were all reviewed and agreed with or any disagreements were addressed in the HPI.    REVIEW OF SYSTEMS :      Review of Systems   Constitutional:  Negative for chills, diaphoresis and fever.   HENT: Negative.     Eyes:  Negative for visual

## 2024-07-28 LAB
ANION GAP SERPL CALCULATED.3IONS-SCNC: 12 MMOL/L (ref 3–16)
BUN SERPL-MCNC: 11 MG/DL (ref 7–20)
CALCIUM SERPL-MCNC: 8.5 MG/DL (ref 8.3–10.6)
CHLORIDE SERPL-SCNC: 96 MMOL/L (ref 99–110)
CO2 SERPL-SCNC: 25 MMOL/L (ref 21–32)
CREAT SERPL-MCNC: <0.5 MG/DL (ref 0.6–1.2)
DEPRECATED RDW RBC AUTO: 13.7 % (ref 12.4–15.4)
EKG ATRIAL RATE: 73 BPM
EKG DIAGNOSIS: NORMAL
EKG P AXIS: 24 DEGREES
EKG P-R INTERVAL: 128 MS
EKG Q-T INTERVAL: 436 MS
EKG QRS DURATION: 80 MS
EKG QTC CALCULATION (BAZETT): 480 MS
EKG R AXIS: -18 DEGREES
EKG T AXIS: 14 DEGREES
EKG VENTRICULAR RATE: 73 BPM
GFR SERPLBLD CREATININE-BSD FMLA CKD-EPI: >90 ML/MIN/{1.73_M2}
GLUCOSE SERPL-MCNC: 73 MG/DL (ref 70–99)
HCT VFR BLD AUTO: 37 % (ref 36–48)
HGB BLD-MCNC: 13.1 G/DL (ref 12–16)
MAGNESIUM SERPL-MCNC: 1.8 MG/DL (ref 1.8–2.4)
MCH RBC QN AUTO: 31.2 PG (ref 26–34)
MCHC RBC AUTO-ENTMCNC: 35.2 G/DL (ref 31–36)
MCV RBC AUTO: 88.5 FL (ref 80–100)
PHOSPHATE SERPL-MCNC: 2.6 MG/DL (ref 2.5–4.9)
PLATELET # BLD AUTO: 159 K/UL (ref 135–450)
PMV BLD AUTO: 9 FL (ref 5–10.5)
POTASSIUM SERPL-SCNC: 3.1 MMOL/L (ref 3.5–5.1)
RBC # BLD AUTO: 4.18 M/UL (ref 4–5.2)
SODIUM SERPL-SCNC: 133 MMOL/L (ref 136–145)
WBC # BLD AUTO: 5.3 K/UL (ref 4–11)

## 2024-07-28 PROCEDURE — 85027 COMPLETE CBC AUTOMATED: CPT

## 2024-07-28 PROCEDURE — 6360000002 HC RX W HCPCS: Performed by: STUDENT IN AN ORGANIZED HEALTH CARE EDUCATION/TRAINING PROGRAM

## 2024-07-28 PROCEDURE — 2580000003 HC RX 258: Performed by: STUDENT IN AN ORGANIZED HEALTH CARE EDUCATION/TRAINING PROGRAM

## 2024-07-28 PROCEDURE — 80048 BASIC METABOLIC PNL TOTAL CA: CPT

## 2024-07-28 PROCEDURE — 6370000000 HC RX 637 (ALT 250 FOR IP): Performed by: STUDENT IN AN ORGANIZED HEALTH CARE EDUCATION/TRAINING PROGRAM

## 2024-07-28 PROCEDURE — 94760 N-INVAS EAR/PLS OXIMETRY 1: CPT

## 2024-07-28 PROCEDURE — 1200000000 HC SEMI PRIVATE

## 2024-07-28 PROCEDURE — 84100 ASSAY OF PHOSPHORUS: CPT

## 2024-07-28 PROCEDURE — 93010 ELECTROCARDIOGRAM REPORT: CPT | Performed by: INTERNAL MEDICINE

## 2024-07-28 PROCEDURE — 83735 ASSAY OF MAGNESIUM: CPT

## 2024-07-28 PROCEDURE — 36415 COLL VENOUS BLD VENIPUNCTURE: CPT

## 2024-07-28 RX ORDER — HYDRALAZINE HYDROCHLORIDE 20 MG/ML
10 INJECTION INTRAMUSCULAR; INTRAVENOUS EVERY 6 HOURS PRN
Status: DISCONTINUED | OUTPATIENT
Start: 2024-07-28 | End: 2024-08-02 | Stop reason: HOSPADM

## 2024-07-28 RX ADMIN — SODIUM CHLORIDE, PRESERVATIVE FREE 10 ML: 5 INJECTION INTRAVENOUS at 09:36

## 2024-07-28 RX ADMIN — CARBIDOPA AND LEVODOPA 3 TABLET: 25; 100 TABLET ORAL at 09:23

## 2024-07-28 RX ADMIN — METRONIDAZOLE 500 MG: 500 INJECTION, SOLUTION INTRAVENOUS at 14:16

## 2024-07-28 RX ADMIN — ENOXAPARIN SODIUM 40 MG: 100 INJECTION SUBCUTANEOUS at 09:36

## 2024-07-28 RX ADMIN — METRONIDAZOLE 500 MG: 500 INJECTION, SOLUTION INTRAVENOUS at 04:34

## 2024-07-28 RX ADMIN — METRONIDAZOLE 500 MG: 500 INJECTION, SOLUTION INTRAVENOUS at 20:10

## 2024-07-28 RX ADMIN — CARBIDOPA AND LEVODOPA 3 TABLET: 25; 100 TABLET ORAL at 20:02

## 2024-07-28 RX ADMIN — BUSPIRONE HYDROCHLORIDE 5 MG: 5 TABLET ORAL at 20:02

## 2024-07-28 RX ADMIN — NIFEDIPINE 30 MG: 30 TABLET, FILM COATED, EXTENDED RELEASE ORAL at 09:24

## 2024-07-28 RX ADMIN — CARBIDOPA AND LEVODOPA 3 TABLET: 25; 100 TABLET ORAL at 14:08

## 2024-07-28 RX ADMIN — LOSARTAN POTASSIUM 100 MG: 100 TABLET, FILM COATED ORAL at 09:24

## 2024-07-28 RX ADMIN — CARBIDOPA AND LEVODOPA 3 TABLET: 25; 100 TABLET ORAL at 18:07

## 2024-07-28 RX ADMIN — BUSPIRONE HYDROCHLORIDE 5 MG: 5 TABLET ORAL at 09:24

## 2024-07-28 RX ADMIN — ASPIRIN 81 MG 81 MG: 81 TABLET ORAL at 09:24

## 2024-07-28 RX ADMIN — POTASSIUM CHLORIDE 40 MEQ: 1500 TABLET, EXTENDED RELEASE ORAL at 06:35

## 2024-07-28 RX ADMIN — WATER 1000 MG: 1 INJECTION INTRAMUSCULAR; INTRAVENOUS; SUBCUTANEOUS at 20:05

## 2024-07-28 NOTE — PROGRESS NOTES
4 Eyes Skin Assessment     NAME:  Audelia Sellers  YOB: 1940  MEDICAL RECORD NUMBER:  [id number]    The patient is being assessed for  Admission    I agree that at least one RN has performed a thorough Head to Toe Skin Assessment on the patient. ALL assessment sites listed below have been assessed.      Areas assessed by both nurses:    Head, Face, Ears, Shoulders, Back, Chest, Arms, Elbows, Hands, Sacrum. Buttock, Coccyx, Ischium, Legs. Feet and Heels, and Under Medical Devices         Does the Patient have a Wound? No noted wound(s)       Fareed Prevention initiated by RN: Yes  Wound Care Orders initiated by RN: No    Pressure Injury (Stage 3,4, Unstageable, DTI, NWPT, and Complex wounds) if present, place Wound referral order by RN under : No    New Ostomies, if present place, Ostomy referral order under : No     Nurse 1 eSignature: Electronically signed by Sanjuanita Bullard RN on 7/27/24 at 9:16 PM EDT    **SHARE this note so that the co-signing nurse can place an eSignature**    Nurse 2 eSignature: Electronically signed by Nicki Suarez RN on 7/27/24 at 9:19 PM EDT

## 2024-07-28 NOTE — PROGRESS NOTES
Patient  at bedside. States patient seems to be at baseline mentally. Patient assisted in opening pudding jello and applesauce. Provided cranberry juice as well.

## 2024-07-28 NOTE — PROGRESS NOTES
V2.0  [facility] [place] Note      Name:  Audelia Sellers /Age/Sex: 1940  (84 y.o. female)   MRN & CSN:  [id number] & [id number] Encounter Date/Time: 2024 12:34 PM EDT    Location:  [id number]/4126-01 PCP: Kaci Montero MD       Hospital Day: 2    Assessment and Plan:   Audelia Sellers is a 84 y.o. female with a pmh of advanced dementia hypertension mood disorder hyperlipidemia Parkinson's disease who presents with Generalized weakness     Hospital Problems               Last Modified POA     * (Principal) Generalized weakness 2024 Yes         Generalized weakness and altered mental status in the setting of concerns for underlying suspected UTI urine cultures are pending de-escalate antibiotic as per culture results previous history of multiple UTIs with chronic suppressive ciprofloxacin on board more than 3 infections the last 6 months in terms of UTI.  Hyperbilirubinemia assess for conjugated versus unconjugated.  GI consulted added Flagyl for cholecystitis concerns,.  Ultrasound has been ordered for the abdomen to rule out cholecystitis and choledocholithiasis.  Parkinson disease continue levodopa carbidopa.  UTI can worsen the underlying on phenomena continue to monitor  Advanced dementia continue to monitor  Benign essential hypertension continue home medication  Hyperlipidemia on statin    Comment: Please note this report has been produced using speech recognition software and may contain errors related to that system including errors in grammar, punctuation, and spelling, as well as words and phrases that may be inappropriate. If there are any questions or concerns please feel free to contact the dictating provider for clarification.   Diet ADULT DIET; Regular; 5 carb choices (75 gm/meal)   DVT Prophylaxis [] Lovenox, []  Heparin, [] SCDs, [] Ambulation,  [] Eliquis, [] Xarelto  [] Coumadin   Code Status Full Code   Disposition From: Home  Expected Disposition: Rehab  Estimated Date of

## 2024-07-28 NOTE — PLAN OF CARE
Problem: Discharge Planning  Goal: Discharge to home or other facility with appropriate resources  7/28/2024 1207 by Susan German RN  Outcome: Progressing    Discharge to home or other facility with appropriate resources: Identify barriers to discharge with patient and caregiver     Problem: Safety - Adult  Goal: Free from fall injury  7/28/2024 1207 by Susan German RN  Outcome: Progressing       Problem: Confusion  Goal: Confusion, delirium, dementia, or psychosis is improved or at baseline  Description: INTERVENTIONS:  1. Assess for possible contributors to thought disturbance, including medications, impaired vision or hearing, underlying metabolic abnormalities, dehydration, psychiatric diagnoses, and notify attending LIP  2. [place] high risk fall precautions, as indicated  3. Provide frequent short contacts to provide reality reorientation, refocusing and direction  4. Decrease environmental stimuli, including noise as appropriate  5. Monitor and intervene to maintain adequate nutrition, hydration, elimination, sleep and activity  6. If unable to ensure safety without constant attention obtain sitter and review sitter guidelines with assigned personnel  7. Initiate Psychosocial CNS and Spiritual Care consult, as indicated  7/28/2024 1207 by Susna German RN  Outcome: Progressing       Problem: Neurosensory - Adult  Goal: Achieves stable or improved neurological status    Goal: Achieves maximal functionality and self care  7/28/2024 1207 by Susan German RN  Outcome: Progressing       Problem: Skin/Tissue Integrity - Adult  Goal: Skin integrity remains intact  7/28/2024 1207 by Susan German RN  Outcome: Progressing    Goal: Oral mucous membranes remain intact  7/28/2024 1207 by Susan German RN  Outcome: Progressing       Problem: Musculoskeletal - Adult  Goal: Return mobility to safest level of function    Goal: Maintain proper alignment of affected body part  7/28/2024 1207 by Monserrat  BRIDGET Davis  Outcome: Progressing    Goal: Return ADL status to a safe level of function  7/28/2024 1207 by Susan German RN  Outcome: Progressing       Problem: Genitourinary - Adult  Goal: Absence of urinary retention  7/28/2024 1207 by Susan German, RN  Outcome: Progressing       Problem: Skin/Tissue Integrity  Goal: Absence of new skin breakdown  Description: 1.  Monitor for areas of redness and/or skin breakdown  2.  Assess vascular access sites hourly  3.  Every 4-6 hours minimum:  Change oxygen saturation probe site  4.  Every 4-6 hours:  If on nasal continuous positive airway pressure, respiratory therapy assess nares and determine need for appliance change or resting period.  Outcome: Progressing

## 2024-07-28 NOTE — PROGRESS NOTES
Patient spitting up food with eating dinner . Patient  at bedside. Patient requests pudding provided pudding

## 2024-07-28 NOTE — PLAN OF CARE
Problem: Discharge Planning  Goal: Discharge to home or other facility with appropriate resources  Outcome: Progressing  Flowsheets (Taken 7/27/2024 4212)  Discharge to home or other facility with appropriate resources: Identify barriers to discharge with patient and caregiver     Problem: Safety - Adult  Goal: Free from fall injury  Outcome: Progressing     Problem: Confusion  Goal: Confusion, delirium, dementia, or psychosis is improved or at baseline  Description: INTERVENTIONS:  1. Assess for possible contributors to thought disturbance, including medications, impaired vision or hearing, underlying metabolic abnormalities, dehydration, psychiatric diagnoses, and notify attending LIP  2. [place] high risk fall precautions, as indicated  3. Provide frequent short contacts to provide reality reorientation, refocusing and direction  4. Decrease environmental stimuli, including noise as appropriate  5. Monitor and intervene to maintain adequate nutrition, hydration, elimination, sleep and activity  6. If unable to ensure safety without constant attention obtain sitter and review sitter guidelines with assigned personnel  7. Initiate Psychosocial CNS and Spiritual Care consult, as indicated  Outcome: Progressing     Problem: Neurosensory - Adult  Goal: Achieves stable or improved neurological status  Outcome: Progressing     Problem: Neurosensory - Adult  Goal: Achieves maximal functionality and self care  Outcome: Progressing     Problem: Skin/Tissue Integrity - Adult  Goal: Skin integrity remains intact  Outcome: Progressing     Problem: Skin/Tissue Integrity - Adult  Goal: Oral mucous membranes remain intact  Outcome: Progressing     Problem: Musculoskeletal - Adult  Goal: Return mobility to safest level of function  Outcome: Progressing     Problem: Musculoskeletal - Adult  Goal: Maintain proper alignment of affected body part  Outcome: Progressing     Problem: Musculoskeletal - Adult  Goal: Return ADL status  to a safe level of function  Outcome: Progressing     Problem: Genitourinary - Adult  Goal: Absence of urinary retention  Outcome: Progressing

## 2024-07-28 NOTE — PROGRESS NOTES
Patient admitted to room 4126 from ED. Patient is oriented to self, disoriented to place, time, and situation. Vitals recorded, see flowsheet. Skin assessment completed.   at the bedside to answer admission questions.  states that patient has had increased confusion over the last few days. Patient tugging at IV and purewick and is speaking in unclear sentences. Patients  states patient would get up and wander before her fall. This RN suggested a tele cam for increased safety precautions. Patients  expresses understanding and agrees for her safety. Tele cam in place. Bed locked in lowest position with alarm set. Call light and needed items within reach.     Electronically signed by Sanjuanita Bullard RN on 7/27/2024 at 9:13 PM

## 2024-07-29 ENCOUNTER — APPOINTMENT (OUTPATIENT)
Dept: ULTRASOUND IMAGING | Age: 84
DRG: 057 | End: 2024-07-29
Payer: MEDICARE

## 2024-07-29 PROBLEM — E44.0 MODERATE MALNUTRITION (HCC): Status: ACTIVE | Noted: 2024-07-29

## 2024-07-29 LAB
ANION GAP SERPL CALCULATED.3IONS-SCNC: 13 MMOL/L (ref 3–16)
BUN SERPL-MCNC: 10 MG/DL (ref 7–20)
CALCIUM SERPL-MCNC: 8.7 MG/DL (ref 8.3–10.6)
CHLORIDE SERPL-SCNC: 99 MMOL/L (ref 99–110)
CO2 SERPL-SCNC: 24 MMOL/L (ref 21–32)
CREAT SERPL-MCNC: <0.5 MG/DL (ref 0.6–1.2)
DEPRECATED RDW RBC AUTO: 13.9 % (ref 12.4–15.4)
GFR SERPLBLD CREATININE-BSD FMLA CKD-EPI: >90 ML/MIN/{1.73_M2}
GLUCOSE SERPL-MCNC: 93 MG/DL (ref 70–99)
HCT VFR BLD AUTO: 36.7 % (ref 36–48)
HGB BLD-MCNC: 13 G/DL (ref 12–16)
MAGNESIUM SERPL-MCNC: 1.7 MG/DL (ref 1.8–2.4)
MCH RBC QN AUTO: 31 PG (ref 26–34)
MCHC RBC AUTO-ENTMCNC: 35.5 G/DL (ref 31–36)
MCV RBC AUTO: 87.2 FL (ref 80–100)
PHOSPHATE SERPL-MCNC: 1.9 MG/DL (ref 2.5–4.9)
PLATELET # BLD AUTO: 174 K/UL (ref 135–450)
PMV BLD AUTO: 9.1 FL (ref 5–10.5)
POTASSIUM SERPL-SCNC: 3.5 MMOL/L (ref 3.5–5.1)
RBC # BLD AUTO: 4.2 M/UL (ref 4–5.2)
SODIUM SERPL-SCNC: 136 MMOL/L (ref 136–145)
WBC # BLD AUTO: 5 K/UL (ref 4–11)

## 2024-07-29 PROCEDURE — 36415 COLL VENOUS BLD VENIPUNCTURE: CPT

## 2024-07-29 PROCEDURE — 6370000000 HC RX 637 (ALT 250 FOR IP): Performed by: STUDENT IN AN ORGANIZED HEALTH CARE EDUCATION/TRAINING PROGRAM

## 2024-07-29 PROCEDURE — 76705 ECHO EXAM OF ABDOMEN: CPT

## 2024-07-29 PROCEDURE — 84100 ASSAY OF PHOSPHORUS: CPT

## 2024-07-29 PROCEDURE — 1200000000 HC SEMI PRIVATE

## 2024-07-29 PROCEDURE — 92610 EVALUATE SWALLOWING FUNCTION: CPT

## 2024-07-29 PROCEDURE — 94760 N-INVAS EAR/PLS OXIMETRY 1: CPT

## 2024-07-29 PROCEDURE — 85027 COMPLETE CBC AUTOMATED: CPT

## 2024-07-29 PROCEDURE — 51701 INSERT BLADDER CATHETER: CPT

## 2024-07-29 PROCEDURE — 6360000002 HC RX W HCPCS: Performed by: NURSE PRACTITIONER

## 2024-07-29 PROCEDURE — 80048 BASIC METABOLIC PNL TOTAL CA: CPT

## 2024-07-29 PROCEDURE — 83735 ASSAY OF MAGNESIUM: CPT

## 2024-07-29 PROCEDURE — 2580000003 HC RX 258: Performed by: STUDENT IN AN ORGANIZED HEALTH CARE EDUCATION/TRAINING PROGRAM

## 2024-07-29 PROCEDURE — 6360000002 HC RX W HCPCS: Performed by: STUDENT IN AN ORGANIZED HEALTH CARE EDUCATION/TRAINING PROGRAM

## 2024-07-29 RX ADMIN — NIFEDIPINE 30 MG: 30 TABLET, FILM COATED, EXTENDED RELEASE ORAL at 10:20

## 2024-07-29 RX ADMIN — CARBIDOPA AND LEVODOPA 3 TABLET: 25; 100 TABLET ORAL at 10:17

## 2024-07-29 RX ADMIN — LOSARTAN POTASSIUM 100 MG: 100 TABLET, FILM COATED ORAL at 10:20

## 2024-07-29 RX ADMIN — WATER 1000 MG: 1 INJECTION INTRAMUSCULAR; INTRAVENOUS; SUBCUTANEOUS at 20:37

## 2024-07-29 RX ADMIN — BUSPIRONE HYDROCHLORIDE 5 MG: 5 TABLET ORAL at 10:20

## 2024-07-29 RX ADMIN — HYDRALAZINE HYDROCHLORIDE 10 MG: 20 INJECTION INTRAMUSCULAR; INTRAVENOUS at 17:20

## 2024-07-29 RX ADMIN — CARBIDOPA AND LEVODOPA 3 TABLET: 25; 100 TABLET ORAL at 12:32

## 2024-07-29 RX ADMIN — ASPIRIN 81 MG 81 MG: 81 TABLET ORAL at 10:21

## 2024-07-29 RX ADMIN — HYDRALAZINE HYDROCHLORIDE 10 MG: 20 INJECTION INTRAMUSCULAR; INTRAVENOUS at 00:17

## 2024-07-29 RX ADMIN — CARBIDOPA AND LEVODOPA 3 TABLET: 25; 100 TABLET ORAL at 20:36

## 2024-07-29 RX ADMIN — HYDRALAZINE HYDROCHLORIDE 10 MG: 20 INJECTION INTRAMUSCULAR; INTRAVENOUS at 08:21

## 2024-07-29 RX ADMIN — CARBIDOPA AND LEVODOPA 3 TABLET: 25; 100 TABLET ORAL at 17:20

## 2024-07-29 RX ADMIN — POTASSIUM BICARBONATE 40 MEQ: 391 TABLET, EFFERVESCENT ORAL at 15:10

## 2024-07-29 RX ADMIN — ENOXAPARIN SODIUM 40 MG: 100 INJECTION SUBCUTANEOUS at 10:16

## 2024-07-29 RX ADMIN — METRONIDAZOLE 500 MG: 500 INJECTION, SOLUTION INTRAVENOUS at 20:43

## 2024-07-29 RX ADMIN — METRONIDAZOLE 500 MG: 500 INJECTION, SOLUTION INTRAVENOUS at 04:11

## 2024-07-29 RX ADMIN — BUSPIRONE HYDROCHLORIDE 5 MG: 5 TABLET ORAL at 20:36

## 2024-07-29 RX ADMIN — SODIUM CHLORIDE, PRESERVATIVE FREE 10 ML: 5 INJECTION INTRAVENOUS at 20:40

## 2024-07-29 RX ADMIN — METRONIDAZOLE 500 MG: 500 INJECTION, SOLUTION INTRAVENOUS at 12:36

## 2024-07-29 NOTE — PLAN OF CARE
Problem: Discharge Planning  Goal: Discharge to home or other facility with appropriate resources  7/28/2024 2013 by Sanjuanita Bullard RN  Outcome: Progressing     Problem: Safety - Adult  Goal: Free from fall injury  7/28/2024 2013 by Sanjuanita Bullard RN  Outcome: Progressing     Problem: Confusion  Goal: Confusion, delirium, dementia, or psychosis is improved or at baseline  Description: INTERVENTIONS:  1. Assess for possible contributors to thought disturbance, including medications, impaired vision or hearing, underlying metabolic abnormalities, dehydration, psychiatric diagnoses, and notify attending LIP  2. [place] high risk fall precautions, as indicated  3. Provide frequent short contacts to provide reality reorientation, refocusing and direction  4. Decrease environmental stimuli, including noise as appropriate  5. Monitor and intervene to maintain adequate nutrition, hydration, elimination, sleep and activity  6. If unable to ensure safety without constant attention obtain sitter and review sitter guidelines with assigned personnel  7. Initiate Psychosocial CNS and Spiritual Care consult, as indicated  7/28/2024 2013 by Sanjuanita Bullard RN  Outcome: Progressing     Problem: Neurosensory - Adult  Goal: Achieves stable or improved neurological status  Outcome: Progressing     Problem: Neurosensory - Adult  Goal: Achieves maximal functionality and self care  7/28/2024 2013 by Sanjuanita Bulladr RN  Outcome: Progressing     Problem: Skin/Tissue Integrity - Adult  Goal: Skin integrity remains intact  7/28/2024 2013 by Sanjuanita Bullard RN  Outcome: Progressing     Problem: Skin/Tissue Integrity - Adult  Goal: Oral mucous membranes remain intact  7/28/2024 2013 by Sanjuanita Bullard RN  Outcome: Progressing     Problem: Musculoskeletal - Adult  Goal: Return mobility to safest level of function  Outcome: Progressing     Problem: Musculoskeletal - Adult  Goal: Maintain proper alignment

## 2024-07-29 NOTE — PROGRESS NOTES
[facility]: WSTZ 4W MED SURG  Initial Assessment  DYSPHAGIA BEDSIDE SWALLOW EVALUATION     Patient: Audelia Sellers   : 1940   MRN: [id number]      Evaluation Date: 2024   Admitting Diagnosis:   Hypomagnesemia [E83.42]  Generalized weakness [R53.1]  Elevated troponin [R79.89]  Elevated brain natriuretic peptide (BNP) level [R79.89]  Altered mental status, unspecified altered mental status type [R41.82]   has a past medical history of Arthritis, Cataract, Chronic diastolic heart failure (HCC), High blood pressure, Hyperlipidemia, and stress test.   has a past surgical history that includes joint replacement and eye surgery ().  Allergies: medication allergies noted  Dysphagia History including instrumental assessment: none on file; spouse denies  Any GI or ENT history: none on file; spouse denies                                             Onset date: 2024     Chart Review:   H&P: Audelia Sellers is a 84 y.o. female with a pmh of advanced dementia hypertension mood disorder hyperlipidemia Parkinson's disease who presents with Generalized weakness.  previous history of multiple UTIs feels too weak to provide any history  is at bedside and provided most of the history states that for the last 2 days patient has been getting worse to the point that he is concerned that she may have a UTI denies any nausea vomiting diarrhea constipation leg pain or leg swelling. Feels too weak and sleepy    24: GI consulted added Flagyl for cholecystitis concerns,. Ultrasound has been ordered for the abdomen to rule out cholecystitis and choledocholithiasis.   Current Consultations this admit: GI    Imaging:  Chest X-ray: 24 No acute process.     Head CT: 24 No acute intracranial abnormality.     CT Cervical Spine WO 24  1. No acute finding of the cervical spine. 2. Chronic pattern of severe degenerative change with severe spinal canal stenosis at C5-6     US Spleen and Gallbladder 24 No  sonographic abnormality.     Reason for referral: SLP evaluation orders received due to pt/family reports of trouble swallowing  and altered mental status; order notes \"spitting up dinner\"     Current Diet Level (prior to evaluation): Regular texture, Thin liquids    Respiratory Status: Room Air     Baseline History/Prior Level of Function:   Living Status: lives at home with spouse; advanced dementia  Baseline diet: regular/thin  Prior Dysphagia History: none reported or on file    Dysphagia Impressions/Diagnosis: Oropharyngeal Dysphagia   OROPHARYNGEAL DYSPHAGIA DIAGNOSIS:   Pt alert and pleasantly confused but highly distractible with visual hallucinations.  She redirects to task with a verbal cue.  OM exam unremarkable.  She is able to self-feed with consistent verbal prompting for attention to task.  Spouse at bedside.    Presents with mild oropharyngeal dysphagia impacted by poor cognition and attention, consistent with advanced dementia.  Oral phase characterized by mildly reduced mastication and AP bolus transit. Pharyngeal phase characterized by delayed swallow initiation and decreased laryngeal elevation.  However, pt effectively orally clears regular texture solids, and presents with no overt s/s of aspiration with any trials, including when talking during PO intake.  No gagging or food aversions noted at this time.    Recommend to continue regular texture and thin liquids with PO meds as tolerated; offer tastes and textures that are preferable to patient.  Supervision with PO for verbal prompts for task progression due to poor attention/distractibility.    ST will f/u 1-2x to confirm recommendations.     MEDICAL OR COGNITIVE/BEHAVIORAL FACTORS WHICH CAN EXACERBATE: mental status; comorbidities; advanced dementia      Recommended Diet and Intervention 7/29/2024:  Diet Solids Recommendation:  Regular texture Liquid Consistency Recommendation:  Thin liquids   Recommended form of Meds: PO as tolerated

## 2024-07-29 NOTE — PROGRESS NOTES
Comprehensive Nutrition Assessment    Type and Reason for Visit:  Initial, Positive Nutrition Screen    Nutrition Recommendations/Plan:   Liberalize diet to regular to help ensure adequacy  Add vanilla Magic Cup tid  Add vegetable or chicken noodle soup at lunch and dinner to start     Malnutrition Assessment:  Malnutrition Status:  Moderate malnutrition (07/29/24 1345)    Context:  Acute Illness     Findings of the 6 clinical characteristics of malnutrition:  Energy Intake:  50% or less of estimated energy requirements for 5 or more days  Weight Loss:  Unable to assess     Body Fat Loss:  Mild body fat loss Buccal region   Muscle Mass Loss:  Mild muscle mass loss Temples (temporalis)  Fluid Accumulation:  Mild Extremities   Strength:  Not Performed    Nutrition Assessment:    MST 2 for wt loss. PMH includes; advanced dementia, Parkinsons, CHF, HTN, HLD. Pt adm r/t generalized wealness. Spouse reported that pt has been sleeping a lot lately, to the point where meals have not been consumed. Diet adv to CCC (5). Intake per spouse has been very little. Feedback did reveal that pt will eat vegetable and chicken noodle soup on occasion. Will add these soups to diet order to help ensure adequacy. Noted a weight loss trend with a 1.8% loss over the past few days, which may be r/t fluid shifts. Noted records reflect a wt of 246 lbs in 2018. will monitor wt trend. Pt demonstrates evidence of muscle and fat wasting.  reported that pt was eating vanilla ice cream at HS. Will add vanilla Magic Cup to trays tid. Will liberalize diet to least restrictive to help ensure adequacy.  reported that current plan is for discharge to a facility. Will continue to follow progress.    Nutrition Related Findings:    Labs reviewed. Noted magnesium and phosphorus are down. Noted a BNP of 2,566. Noted BM on 7/29. Noted +2 pitting edema to BLE. Wound Type: None       Current Nutrition Intake & Therapies:    Average Meal Intake:

## 2024-07-29 NOTE — PROGRESS NOTES
PCA informed RN pt has not peed. RN bladder scan pt 878ml retained. Messaged Dr. Nieves. Order placed for 1 time straight cath. Straight cath performed. Approx 750ml removed. Urine dark and clear. Pt tolerated well. Electronically signed by ADELA NEWMAN RN on 7/29/2024 at 4:33 PM

## 2024-07-29 NOTE — PLAN OF CARE
Problem: Discharge Planning  Goal: Discharge to home or other facility with appropriate resources  7/29/2024 1844 by Maria De Jesus Penaloza RN  Outcome: Progressing  Flowsheets (Taken 7/29/2024 1844)  Discharge to home or other facility with appropriate resources: Identify barriers to discharge with patient and caregiver  7/29/2024 1535 by Maria De Jesus Penaloza RN  Outcome: Progressing  Flowsheets (Taken 7/29/2024 1535)  Discharge to home or other facility with appropriate resources: Identify barriers to discharge with patient and caregiver     Problem: Safety - Adult  Goal: Free from fall injury  7/29/2024 1844 by Maria De Jesus Penaloza RN  Outcome: Progressing  Flowsheets (Taken 7/29/2024 1844)  Free From Fall Injury: Instruct family/caregiver on patient safety  7/29/2024 1535 by Maria De Jesus Penaloza RN  Outcome: Progressing  Flowsheets (Taken 7/29/2024 1535)  Free From Fall Injury: Instruct family/caregiver on patient safety     Problem: Confusion  Goal: Confusion, delirium, dementia, or psychosis is improved or at baseline  Description: INTERVENTIONS:  1. Assess for possible contributors to thought disturbance, including medications, impaired vision or hearing, underlying metabolic abnormalities, dehydration, psychiatric diagnoses, and notify attending LIP  2. [place] high risk fall precautions, as indicated  3. Provide frequent short contacts to provide reality reorientation, refocusing and direction  4. Decrease environmental stimuli, including noise as appropriate  5. Monitor and intervene to maintain adequate nutrition, hydration, elimination, sleep and activity  6. If unable to ensure safety without constant attention obtain sitter and review sitter guidelines with assigned personnel  7. Initiate Psychosocial CNS and Spiritual Care consult, as indicated  7/29/2024 1844 by Maria De Jesus Penaloza RN  Outcome: Progressing  7/29/2024 1535 by Maria De Jesus Penaloza RN  Outcome: Progressing  Flowsheets

## 2024-07-29 NOTE — PROGRESS NOTES
gm/meal)   DVT Prophylaxis [] Lovenox, []  Heparin, [] SCDs, [] Ambulation,  [] Eliquis, [] Xarelto  [] Coumadin   Code Status Full Code   Disposition From: Home  Expected Disposition: Rehab  Estimated Date of Discharge: 2 to 3 days  Patient requires continued admission due to weakness and UTI concerns   Surrogate Decision Maker/ POA      Subjective:     Chief Complaint: Fatigue (Pt from home via EMS. EMS states  said that she is weak today and couldn't get out of bed. Pt was recently seen in hospital for witnessed fall. Denies LOC, chest pain, n/v, headache. Pt is on blood thinners. Hx of parkinson's, HTN, HF.  states he gave her morning medications but did not eat today since she could not get out of bed. )     The patient was seen at bedside. All questions answered at bedside. NAEON. Tolerating diet. No BM last night. Good UOP.    Patient mentation is improved significantly during hospital stay      Review of Systems:    Review of Systems  Negative except above    Objective:     Intake/Output Summary (Last 24 hours) at 7/29/2024 1245  Last data filed at 7/29/2024 0017  Gross per 24 hour   Intake 340 ml   Output 450 ml   Net -110 ml          Vitals:   Vitals:    07/29/24 1133   BP: 108/63   Pulse: 69   Resp: 16   Temp: 98.5 °F (36.9 °C)   SpO2: 94%       Physical Exam:   Physical Exam  Vitals reviewed.   Constitutional:       Appearance: Normal appearance. She is normal weight.   HENT:      Head: Normocephalic.      Nose: Nose normal.      Mouth/Throat:      Mouth: Mucous membranes are moist.   Eyes:      Conjunctiva/sclera: Conjunctivae normal.      Pupils: Pupils are equal, round, and reactive to light.   Cardiovascular:      Rate and Rhythm: Normal rate and regular rhythm.      Pulses: Normal pulses.      Heart sounds: Normal heart sounds. No murmur heard.  Pulmonary:      Effort: Pulmonary effort is normal.      Breath sounds: Normal breath sounds. No wheezing, rhonchi or rales.   Abdominal:       4:56 AM   Result Value Ref Range    Magnesium 1.70 (L) 1.80 - 2.40 mg/dL   Phosphorus    Collection Time: 07/29/24  4:56 AM   Result Value Ref Range    Phosphorus 1.9 (L) 2.5 - 4.9 mg/dL   CBC    Collection Time: 07/29/24  4:57 AM   Result Value Ref Range    WBC 5.0 4.0 - 11.0 K/uL    RBC 4.20 4.00 - 5.20 M/uL    Hemoglobin 13.0 12.0 - 16.0 g/dL    Hematocrit 36.7 36.0 - 48.0 %    MCV 87.2 80.0 - 100.0 fL    MCH 31.0 26.0 - 34.0 pg    MCHC 35.5 31.0 - 36.0 g/dL    RDW 13.9 12.4 - 15.4 %    Platelets 174 135 - 450 K/uL    MPV 9.1 5.0 - 10.5 fL        Imaging/Diagnostics Last 24 Hours   XR CHEST PORTABLE    Result Date: 7/27/2024  EXAMINATION: ONE XRAY VIEW OF THE CHEST 7/27/2024 1:05 pm COMPARISON: None. HISTORY: ORDERING SYSTEM PROVIDED HISTORY: AMS TECHNOLOGIST PROVIDED HISTORY: Reason for exam:->AMS Reason for Exam: ams FINDINGS: The lungs are without acute focal process.  There is no effusion or pneumothorax. The cardiomediastinal silhouette is without acute process. The osseous structures are without acute process.  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     No acute process.       Electronically signed by Nora Nieves MD on 7/29/2024 at 12:45 PM

## 2024-07-30 LAB
ANION GAP SERPL CALCULATED.3IONS-SCNC: 14 MMOL/L (ref 3–16)
BUN SERPL-MCNC: 7 MG/DL (ref 7–20)
CALCIUM SERPL-MCNC: 8.8 MG/DL (ref 8.3–10.6)
CHLORIDE SERPL-SCNC: 99 MMOL/L (ref 99–110)
CO2 SERPL-SCNC: 21 MMOL/L (ref 21–32)
CREAT SERPL-MCNC: <0.5 MG/DL (ref 0.6–1.2)
DEPRECATED RDW RBC AUTO: 14.1 % (ref 12.4–15.4)
GFR SERPLBLD CREATININE-BSD FMLA CKD-EPI: >90 ML/MIN/{1.73_M2}
GLUCOSE SERPL-MCNC: 91 MG/DL (ref 70–99)
HCT VFR BLD AUTO: 39.7 % (ref 36–48)
HGB BLD-MCNC: 14.1 G/DL (ref 12–16)
MAGNESIUM SERPL-MCNC: 1.8 MG/DL (ref 1.8–2.4)
MCH RBC QN AUTO: 31.1 PG (ref 26–34)
MCHC RBC AUTO-ENTMCNC: 35.6 G/DL (ref 31–36)
MCV RBC AUTO: 87.5 FL (ref 80–100)
PHOSPHATE SERPL-MCNC: 2.1 MG/DL (ref 2.5–4.9)
PLATELET # BLD AUTO: 217 K/UL (ref 135–450)
PMV BLD AUTO: 8.9 FL (ref 5–10.5)
POTASSIUM SERPL-SCNC: 3.3 MMOL/L (ref 3.5–5.1)
RBC # BLD AUTO: 4.54 M/UL (ref 4–5.2)
SODIUM SERPL-SCNC: 134 MMOL/L (ref 136–145)
WBC # BLD AUTO: 7.5 K/UL (ref 4–11)

## 2024-07-30 PROCEDURE — 97166 OT EVAL MOD COMPLEX 45 MIN: CPT

## 2024-07-30 PROCEDURE — 6370000000 HC RX 637 (ALT 250 FOR IP): Performed by: STUDENT IN AN ORGANIZED HEALTH CARE EDUCATION/TRAINING PROGRAM

## 2024-07-30 PROCEDURE — 97535 SELF CARE MNGMENT TRAINING: CPT

## 2024-07-30 PROCEDURE — 2580000003 HC RX 258: Performed by: STUDENT IN AN ORGANIZED HEALTH CARE EDUCATION/TRAINING PROGRAM

## 2024-07-30 PROCEDURE — 80048 BASIC METABOLIC PNL TOTAL CA: CPT

## 2024-07-30 PROCEDURE — 84100 ASSAY OF PHOSPHORUS: CPT

## 2024-07-30 PROCEDURE — 6360000002 HC RX W HCPCS: Performed by: NURSE PRACTITIONER

## 2024-07-30 PROCEDURE — 51798 US URINE CAPACITY MEASURE: CPT

## 2024-07-30 PROCEDURE — 2580000003 HC RX 258: Performed by: NURSE PRACTITIONER

## 2024-07-30 PROCEDURE — 97530 THERAPEUTIC ACTIVITIES: CPT

## 2024-07-30 PROCEDURE — 6360000002 HC RX W HCPCS: Performed by: STUDENT IN AN ORGANIZED HEALTH CARE EDUCATION/TRAINING PROGRAM

## 2024-07-30 PROCEDURE — 92526 ORAL FUNCTION THERAPY: CPT

## 2024-07-30 PROCEDURE — 97116 GAIT TRAINING THERAPY: CPT

## 2024-07-30 PROCEDURE — 94760 N-INVAS EAR/PLS OXIMETRY 1: CPT

## 2024-07-30 PROCEDURE — 85027 COMPLETE CBC AUTOMATED: CPT

## 2024-07-30 PROCEDURE — 51701 INSERT BLADDER CATHETER: CPT

## 2024-07-30 PROCEDURE — 36415 COLL VENOUS BLD VENIPUNCTURE: CPT

## 2024-07-30 PROCEDURE — 97162 PT EVAL MOD COMPLEX 30 MIN: CPT

## 2024-07-30 PROCEDURE — 83735 ASSAY OF MAGNESIUM: CPT

## 2024-07-30 PROCEDURE — 1200000000 HC SEMI PRIVATE

## 2024-07-30 RX ADMIN — POTASSIUM BICARBONATE 40 MEQ: 391 TABLET, EFFERVESCENT ORAL at 09:01

## 2024-07-30 RX ADMIN — METRONIDAZOLE 500 MG: 500 INJECTION, SOLUTION INTRAVENOUS at 20:44

## 2024-07-30 RX ADMIN — NIFEDIPINE 30 MG: 30 TABLET, FILM COATED, EXTENDED RELEASE ORAL at 09:12

## 2024-07-30 RX ADMIN — CARBIDOPA AND LEVODOPA 3 TABLET: 25; 100 TABLET ORAL at 13:02

## 2024-07-30 RX ADMIN — METRONIDAZOLE 500 MG: 500 INJECTION, SOLUTION INTRAVENOUS at 05:02

## 2024-07-30 RX ADMIN — ENOXAPARIN SODIUM 40 MG: 100 INJECTION SUBCUTANEOUS at 09:13

## 2024-07-30 RX ADMIN — ASPIRIN 81 MG 81 MG: 81 TABLET ORAL at 09:12

## 2024-07-30 RX ADMIN — METRONIDAZOLE 500 MG: 500 INJECTION, SOLUTION INTRAVENOUS at 13:07

## 2024-07-30 RX ADMIN — WATER 1000 MG: 1 INJECTION INTRAMUSCULAR; INTRAVENOUS; SUBCUTANEOUS at 20:43

## 2024-07-30 RX ADMIN — ZIPRASIDONE MESYLATE 10 MG: 20 INJECTION, POWDER, LYOPHILIZED, FOR SOLUTION INTRAMUSCULAR at 01:35

## 2024-07-30 RX ADMIN — BUSPIRONE HYDROCHLORIDE 5 MG: 5 TABLET ORAL at 09:12

## 2024-07-30 RX ADMIN — CARBIDOPA AND LEVODOPA 3 TABLET: 25; 100 TABLET ORAL at 09:10

## 2024-07-30 RX ADMIN — LOSARTAN POTASSIUM 100 MG: 100 TABLET, FILM COATED ORAL at 09:12

## 2024-07-30 RX ADMIN — SODIUM CHLORIDE, PRESERVATIVE FREE 10 ML: 5 INJECTION INTRAVENOUS at 20:45

## 2024-07-30 RX ADMIN — CARBIDOPA AND LEVODOPA 3 TABLET: 25; 100 TABLET ORAL at 17:03

## 2024-07-30 RX ADMIN — CARBIDOPA AND LEVODOPA 3 TABLET: 25; 100 TABLET ORAL at 20:43

## 2024-07-30 RX ADMIN — BUSPIRONE HYDROCHLORIDE 5 MG: 5 TABLET ORAL at 20:43

## 2024-07-30 NOTE — CARE COORDINATION
7/30  requests to see CM in room. Met with patient and  at bedside and  has questions regarding Hospice. MD notified and he will consult Palliative Care to discuss options.Electronically signed by Nafisa Bustos RN on 7/30/2024 at 4:14 PM

## 2024-07-30 NOTE — PROGRESS NOTES
worsens with standing        Plan   Occupational Therapy Plan  Times Per Week: 3-5x  Current Treatment Recommendations: Strengthening, Balance training, Functional mobility training, Endurance training, Gait training, Safety education & training, Patient/Caregiver education & training, Positioning, Modalities, Cognitive reorientation, Equipment evaluation, education, & procurement, Self-Care / ADL     Restrictions  Restrictions/Precautions  Restrictions/Precautions: Fall Risk    Subjective   General  Chart Reviewed: Yes  Patient assessed for rehabilitation services?: Yes  Additional Pertinent Hx: Per H&P note on 7/27/24, \"Audelia Sellers is a 84 y.o. female who presented to the hospital from home because of generalized weakness fatigue previous history of multiple UTIs feels too weak to provide any history  is at bedside and provided most of the history states that for the last 2 days patient has been getting worse to the point that he is concerned that she may have a UTI.\"  Family / Caregiver Present: Yes (spouse)  Referring Practitioner: Nora Nieves MD  Diagnosis: weakness; UTI  Subjective  Subjective: Pt met bedside upon arrival to room, agreeable to therapy eval/tx in tandem with PT. no reports of pain. pt reports dizziness with activity.     Social/Functional History  Social/Functional History  Lives With: Spouse  Type of Home: House  Home Layout: One level, Laundry in basement  Home Access: Stairs to enter without rails  Entrance Stairs - Number of Steps: 2-3  Bathroom Shower/Tub: Walk-in shower  Bathroom Toilet: Standard  Bathroom Equipment: Shower chair, Grab bars in shower, Hand-held shower  Bathroom Accessibility: Walker accessible  Home Equipment: Walker - Rolling  Has the patient had two or more falls in the past year or any fall with injury in the past year?: Yes  ADL Assistance: Independent ( helps with showers)  Homemaking Assistance:  ( does all)  Homemaking Responsibilities:    Time In 1047         Time Out 1155         Minutes 68         Timed Code Treatment Minutes: 53 Minutes (15 minute eval)  *increased time spent with pt 2/2 pt incontinent of stool and urine and reporting dizziness with all standing activities      Electronically signed by JEAN CARLOS Cruz on 7/30/2024 at 11:59 AM

## 2024-07-30 NOTE — PROGRESS NOTES
V2.0    [facility] [place]      Name:  Audelia Sellers /Age/Sex: 1940  (84 y.o. female)   MRN & CSN:  [id number] & [id number] Encounter Date/Time: 2024 2:05 PM EDT   Location:  [id number]/4126-01 PCP: Kaci Montero MD     Attending:Asa Carrion MD       Hospital Day: 4    Assessment and Recommendations   Audelia Sellers is a 84 y.o. female with pmh of hypertension, advanced dementia, mood disorder, hyperlipidemia and Parkinson's disease who presents with Generalized weakness patient was found to have urinary tract infection and is currently being treated with Rocephin and Flagyl IV      Plan:   Generalized  weakness and altered mental status.  The patient was found to have a urinary tract infection.  Patient is currently on treatment with Rocephin IV and Flagyl IV.  Due to patient's advanced dementia and her current weakness and her medical history specially with Parkinson's it was greatly advised that patient is to be discharged to a long-term facility.  Urinary tract infection patient is currently on Rocephin will continue to monitor follow her symptoms  Hyperbilirubinemia General surgery was consulted and they recommended that patient be placed on Flagyl for most likely cholecystitis.  Hypertension will continue to follow and monitor patient's symptoms  Parkinson's disease patient is currently on levodopa carbidopa will continue to follow monitor for further management evaluation.    Diet ADULT DIET; Regular; 5 carb choices (75 gm/meal)  ADULT ORAL NUTRITION SUPPLEMENT; Breakfast, Lunch, Dinner; Frozen Oral Supplement   DVT Prophylaxis [] Lovenox, []  Heparin, [] SCDs, [] Ambulation,  [] Eliquis, [] Xarelto  [] Coumadin   Code Status Full Code   Disposition From: Home  Expected Disposition: Long-term facility versus rehab  Estimated Date of Discharge: 1 to 2 days  Patient requires continued admission due to IV antibiotics and weekly   Surrogate Decision Maker/ POA       Personally reviewed Lab Studies

## 2024-07-30 NOTE — PROGRESS NOTES
Pt is not peeing well, 100 ml in canister, on external catheter. RN did bladder scan shows 402 ml. RN secured the message through perfect serve.

## 2024-07-30 NOTE — PLAN OF CARE
redness and/or skin breakdown  Goal: Oral mucous membranes remain intact  7/29/2024 2252 by Chela Ro RN  Outcome: Progressing  7/29/2024 1844 by Maria De Jesus Penaloza RN  Outcome: Progressing  7/29/2024 1535 by Maria De Jesus Penaloza RN  Outcome: Progressing  Flowsheets (Taken 7/29/2024 1535)  Oral Mucous Membranes Remain Intact: Assess oral mucosa and hygiene practices     Problem: Musculoskeletal - Adult  Goal: Return mobility to safest level of function  7/29/2024 2252 by Chela Ro RN  Outcome: Progressing  7/29/2024 1844 by Maria De Jesus Penaloza RN  Outcome: Progressing  7/29/2024 1535 by Maria De Jesus Penaloza RN  Outcome: Progressing  Goal: Maintain proper alignment of affected body part  7/29/2024 2252 by Chela Ro RN  Outcome: Progressing  7/29/2024 1844 by Maria De Jesus Penaloza RN  Outcome: Progressing  7/29/2024 1535 by Maria De Jesus Penaloza RN  Outcome: Progressing  Goal: Return ADL status to a safe level of function  7/29/2024 2252 by Chela Ro RN  Outcome: Progressing  7/29/2024 1844 by Maria De Jesus Penaloza RN  Outcome: Progressing  7/29/2024 1535 by Maria De Jesus Penaloza RN  Outcome: Progressing     Problem: Genitourinary - Adult  Goal: Absence of urinary retention  7/29/2024 2252 by Chela Ro RN  Outcome: Progressing  7/29/2024 1844 by Maria De Jesus Penaloza RN  Outcome: Progressing  7/29/2024 1535 by Maria De Jesus Penaloza RN  Outcome: Progressing     Problem: Skin/Tissue Integrity  Goal: Absence of new skin breakdown  Description: 1.  Monitor for areas of redness and/or skin breakdown  2.  Assess vascular access sites hourly  3.  Every 4-6 hours minimum:  Change oxygen saturation probe site  4.  Every 4-6 hours:  If on nasal continuous positive airway pressure, respiratory therapy assess nares and determine need for appliance change or resting period.  7/29/2024 2252 by Chela Ro, RN  Outcome: Progressing  7/29/2024 1844 by Maria De Jesus Penaloza

## 2024-07-30 NOTE — PROGRESS NOTES
1037: RN went into pt's room, pt has not voided since 0200 straight cath. Pt stated they did not have urge to void. RN to bladder scan pt, pt stated they have to void. Pt able to void. RN bladder scanned pt, 286mL PVR.    1105: PT/OT working with pt, PT/OT informed RN that pt voided again, along with a BM. Donna care completed, pt back in bed, spouse at bedside.     Electronically signed by Eric Bonds RN on 7/30/2024 at 12:30 PM

## 2024-07-30 NOTE — PROGRESS NOTES
Pt is agitated, super confused and hallucinating. Pt is trying to get out most of the time. Pt is on Tele camera. Bed exit alarm on.

## 2024-07-30 NOTE — PROGRESS NOTES
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       Current Diet Level : Regular texture, Thin liquids    Comments regarding toleration: no concerns/complaints    Respiratory Status: stable      Dysphagia Therapy Impressions  Diagnosis: Oropharyngeal Dysphagia   OROPHARYNGEAL DYSPHAGIA DIAGNOSIS:   Pt alert and pleasantly confused but highly distractible; redirects to task with a verbal cue.  Spouse at bedside.  Presents with mild oropharyngeal dysphagia impacted by poor cognition and attention, consistent with advanced dementia.  Oral phase characterized by mildly reduced mastication and AP bolus transit. Pharyngeal phase characterized by delayed swallow initiation and decreased laryngeal elevation.  However, pt effectively orally clears regular texture solids, and presents with no overt s/s of aspiration with any trials, including when talking during PO intake.  No gagging or food aversions noted at this time.  Recommend to continue regular texture and thin liquids with PO meds as tolerated; offer tastes and textures that are preferable to patient.  Supervision with PO for verbal prompts for task progression due to poor attention/distractibility.  Discontinue skilled ST d/t max potential achieved at this time     Recommended Diet and Intervention 7/30/2024:  Diet Solids Recommendation:  Regular texture Liquid Consistency Recommendation:  Thin liquids   Recommended form of Meds: PO per patient preference     Compensatory Swallowing Strategies:  Upright as possible with all PO intake , Remain upright 30-45 min     SHORT TERM DYSPHAGIA GOALS/PLAN OF CARE: Speech therapy for dysphagia tx  discontinued this date d/t max potential achieved at this time  Goals  Pt will functionally tolerate recommended diet with no overt clinical s/s of aspiration met       Dysphagia Therapeutic Intervention:  Diet Tolerance Monitoring , Patient/Family Education     Dysphagia Prognosis: good,  guarded  Barriers to progress: co-morbidities, mental status, prior level of function  Discharge Recommendations: Do not anticipate need for further speech/dysphagia therapy upon discharge from hospital       THERAPY DATA  Pain:  Did not state    Vision: Adequate for assessment/tx needs  Hearing: Adequate for assessment/tx needs    Cognitive/behavioral/communication: alert, cooperative, confused, distractible, verbally responsive, follows one step commands with cues     Dentition: Adequate  Patient Positioning: Upright in bed     PO Trials:   IDDSI 0 (thin):  No overt signs/symptoms of penetration/aspiration  IDDSI 2 (mildly thick):  DNT  IDDSI 3 (moderately thick):  DNT  IDDSI 4 (puree):  DNT  IDDSI 5 (minced and moist):  DNT  IDDSI 6 (soft and bite-sized):  DNT  IDDSI 7 (regular):  Prolonged bolus formation, Prolonged oral transit, No overt signs/symptoms of penetration/aspiration    Dysphagia Tx:   Direct Dysphagia tx: consistent with prior dysphagia status  Dysphagia ex: Not applicable  Training in compensatory strategies: Dependent  Pt response to ex/training:  family verbalizes understanding      Eating Assistance: Supervision or touching assistance      EDUCATION:   Provided education regarding role of SLP, results of assessment, recommendations and general speech pathology plan of care.   Patient Response: Concern for reduced/recall insight  Family education: Family verbalizes understanding/agreement    If patient discharges prior to next visit, this note will serve as discharge.     Timed Code Minutes: 0  Total Treatment Minutes: 21    Electronically signed by:    Anastasiya Christianson MA, CCC-SLP, #[id number]  Speech-Language Pathologist  Portable phone: [phone]   07/30/24  3:59 PM

## 2024-07-30 NOTE — PROGRESS NOTES
middle of session)  Referring Practitioner: Nora Nieves MD  Referral Date : 07/29/24  Diagnosis: generalized weakness and altered MS  Follows Commands: Impaired  Subjective  Subjective: the pt was found to be in the bed; she has slightly slurred speech; delayed responses at times; confused         Social/Functional History  Social/Functional History  Lives With: Spouse  Type of Home: House  Home Layout: One level, Laundry in basement  Home Access: Stairs to enter without rails  Entrance Stairs - Number of Steps: 2-3  Bathroom Shower/Tub: Walk-in shower  Bathroom Toilet: Standard  Bathroom Equipment: Shower chair, Grab bars in shower, Hand-held shower  Bathroom Accessibility: Walker accessible  Home Equipment: Walker - Rolling  Has the patient had two or more falls in the past year or any fall with injury in the past year?: Yes  ADL Assistance: Independent ( helps with showers)  Homemaking Assistance:  ( does all)  Homemaking Responsibilities: No  Ambulation Assistance: Independent (RW)  Transfer Assistance: Independent  Active : No  Leisure & Hobbies: casino, watching [employer]  Additional Comments: sleeps in regular, flat bed  Vision/Hearing  Vision  Vision: Impaired  Vision Exceptions: Wears glasses at all times  Hearing  Hearing: Exceptions to WFL  Hearing Exceptions: Hard of hearing/hearing concerns    Cognition   Cognition  Overall Cognitive Status: Exceptions  Following Commands: Follows one step commands with repetition;Follows one step commands with increased time  Attention Span: Attends with cues to redirect  Memory: Decreased recall of precautions;Decreased recall of recent events;Decreased short term memory  Safety Judgement: Decreased awareness of need for assistance;Decreased awareness of need for safety  Problem Solving: Decreased awareness of errors  Insights: Not aware of deficits  Initiation: Requires cues for all  Sequencing: Requires cues for all     Objective

## 2024-07-30 NOTE — PROGRESS NOTES
Pt is being agitated, hallucinating, tried to get out of the bed. Informed to NP, sent the message. Got an order for inj. Geodon 10 MG IM once dose, given. Again, bladder scan done, 389 urine found. Straight cath done according to the order. 280 ml took out. Documentation done.

## 2024-07-30 NOTE — PROGRESS NOTES
SLP NOTE    Dysphagia tx/follow-up attempted. Patient declining additional PO after completing lunch this date. RN and spouse deny concerns for reduced diet tolerance. ST to re-attempt as schedule permits (potentially at a later date) unless otherwise notified.    Thank you.  Anastasiya Christianson MA, CCC-SLP, #[id number]  Speech-Language Pathologist  Portable phone: [phone]

## 2024-07-30 NOTE — PLAN OF CARE
Problem: Discharge Planning  Goal: Discharge to home or other facility with appropriate resources  7/30/2024 1015 by Eric Bonds RN  Outcome: Progressing  Flowsheets  Taken 7/30/2024 1015 by Eric Bonds RN  Discharge to home or other facility with appropriate resources: Identify barriers to discharge with patient and caregiver  Taken 7/29/2024 2330 by Chela Ro RN  Discharge to home or other facility with appropriate resources: Identify barriers to discharge with patient and caregiver     Problem: Safety - Adult  Goal: Free from fall injury  7/30/2024 1015 by Eric Bonds RN  Outcome: Progressing  Flowsheets (Taken 7/30/2024 1015)  Free From Fall Injury: Instruct family/caregiver on patient safety  Note: Potential fall hazards identified and removed accordingly. Pt educated to use call light for assistance. Bed locked, in lowest position with alarm on.      Problem: Confusion  Goal: Confusion, delirium, dementia, or psychosis is improved or at baseline  Description: INTERVENTIONS:  1. Assess for possible contributors to thought disturbance, including medications, impaired vision or hearing, underlying metabolic abnormalities, dehydration, psychiatric diagnoses, and notify attending LIP  2. [place] high risk fall precautions, as indicated  3. Provide frequent short contacts to provide reality reorientation, refocusing and direction  4. Decrease environmental stimuli, including noise as appropriate  5. Monitor and intervene to maintain adequate nutrition, hydration, elimination, sleep and activity  6. If unable to ensure safety without constant attention obtain sitter and review sitter guidelines with assigned personnel  7. Initiate Psychosocial CNS and Spiritual Care consult, as indicated  7/30/2024 1015 by Eric Bonds RN  Outcome: Progressing  Flowsheets (Taken 7/30/2024 1015)  Effect of thought disturbance (confusion, delirium, dementia, or psychosis) are managed with adequate functional

## 2024-07-30 NOTE — CARE COORDINATION
precert and HENs.    The Plan for Transition of Care is related to the following treatment goals of Hypomagnesemia [E83.42]  Generalized weakness [R53.1]  Elevated troponin [R79.89]  Elevated brain natriuretic peptide (BNP) level [R79.89]  Altered mental status, unspecified altered mental status type [R41.82]    IF APPLICABLE: The Patient and/or patient representative Audelia and her family were provided with a choice of provider and agrees with the discharge plan. Freedom of choice list with basic dialogue that supports the patient's individualized plan of care/goals and shares the quality data associated with the providers was provided to: (P) Patient Representative   Patient Representative Name: (P) Hero Sellers     The Patient and/or Patient Representative Agree with the Discharge Plan? (P) Yes    Nafisa Bustos RN  Case Management Department  Ph: [phone]      07/30/24 0958   Service Assessment   Patient Orientation Other (see comment)  (Confused)   Cognition Dementia / Early Alzheimer's   History Provided By Spouse   Primary Caregiver Spouse   Accompanied By/Relationship    Support Systems Spouse/Significant Other;Children   Patient's Healthcare Decision Maker is: Legal Next of Kin   PCP Verified by CM Yes   Last Visit to PCP Within last year   Prior Functional Level Assistance with the following:;Bathing;Dressing;Toileting;Cooking;Housework;Shopping;Mobility   Current Functional Level Assistance with the following:;Bathing;Dressing;Toileting;Cooking;Housework;Shopping;Mobility;Feeding   Can patient return to prior living arrangement Unknown at present   Ability to make needs known: Poor   Family able to assist with home care needs: Yes   Would you like for me to discuss the discharge plan with any other family members/significant others, and if so, who? Yes  (spouse)   Financial Resources Medicare   Community Resources None   CM/SW Referral Other (see comment)  (needs placement)   Discharge Planning    Type of Residence House  (3 steps)   Living Arrangements Spouse/Significant Other   Current Services Prior To Admission None   Potential Assistance Needed Skilled Nursing Facility   DME Ordered? No   Potential Assistance Purchasing Medications No   Type of Home Care Services None   Patient expects to be discharged to: Skilled nursing facility   Follow Up Appointment: Best Day/Time  Monday AM   One/Two Story Residence One story   History of falls? 1   Services At/After Discharge   Transition of Care Consult (CM Consult) SNF   Services At/After Discharge Skilled Nursing Facility (SNF)   [place] Resource Information Provided? No   Mode of Transport at Discharge BLS   Confirm Follow Up Transport Other (see comment)  (BLS)   Condition of Participation: Discharge Planning   The Plan for Transition of Care is related to the following treatment goals: to return to home   The Patient and/or Patient Representative was provided with a Choice of Provider? Patient Representative   Name of the Patient Representative who was provided with the Choice of Provider and agrees with the Discharge Plan?  Hero Sellers   The Patient and/Or Patient Representative agree with the Discharge Plan? Yes   Freedom of Choice list was provided with basic dialogue that supports the patient's individualized plan of care/goals, treatment preferences, and shares the quality data associated with the providers?  Yes     Electronically signed by Nafisa Bustos RN on 7/30/2024 at 10:10 AM

## 2024-07-31 LAB
BACTERIA BLD CULT ORG #2: NORMAL
BACTERIA BLD CULT: NORMAL
BACTERIA URNS QL MICRO: ABNORMAL /HPF
BILIRUB UR QL STRIP.AUTO: NEGATIVE
CLARITY UR: CLEAR
COLOR UR: ABNORMAL
EPI CELLS #/AREA URNS AUTO: 0 /HPF (ref 0–5)
GLUCOSE UR STRIP.AUTO-MCNC: NEGATIVE MG/DL
HGB UR QL STRIP.AUTO: NEGATIVE
HYALINE CASTS #/AREA URNS AUTO: 0 /LPF (ref 0–8)
KETONES UR STRIP.AUTO-MCNC: 15 MG/DL
LEUKOCYTE ESTERASE UR QL STRIP.AUTO: ABNORMAL
NITRITE UR QL STRIP.AUTO: POSITIVE
PH UR STRIP.AUTO: 7 [PH] (ref 5–8)
PROT UR STRIP.AUTO-MCNC: 30 MG/DL
RBC CLUMPS #/AREA URNS AUTO: 7 /HPF (ref 0–4)
SP GR UR STRIP.AUTO: 1.02 (ref 1–1.03)
UA DIPSTICK W REFLEX MICRO PNL UR: YES
URN SPEC COLLECT METH UR: ABNORMAL
UROBILINOGEN UR STRIP-ACNC: 1 E.U./DL
WBC #/AREA URNS AUTO: 52 /HPF (ref 0–5)
YEAST URNS QL MICRO: PRESENT /HPF

## 2024-07-31 PROCEDURE — 94760 N-INVAS EAR/PLS OXIMETRY 1: CPT

## 2024-07-31 PROCEDURE — 6360000002 HC RX W HCPCS: Performed by: STUDENT IN AN ORGANIZED HEALTH CARE EDUCATION/TRAINING PROGRAM

## 2024-07-31 PROCEDURE — 97530 THERAPEUTIC ACTIVITIES: CPT

## 2024-07-31 PROCEDURE — 51701 INSERT BLADDER CATHETER: CPT

## 2024-07-31 PROCEDURE — 51798 US URINE CAPACITY MEASURE: CPT

## 2024-07-31 PROCEDURE — 6360000002 HC RX W HCPCS: Performed by: NURSE PRACTITIONER

## 2024-07-31 PROCEDURE — 6370000000 HC RX 637 (ALT 250 FOR IP): Performed by: STUDENT IN AN ORGANIZED HEALTH CARE EDUCATION/TRAINING PROGRAM

## 2024-07-31 PROCEDURE — 87086 URINE CULTURE/COLONY COUNT: CPT

## 2024-07-31 PROCEDURE — 2580000003 HC RX 258: Performed by: STUDENT IN AN ORGANIZED HEALTH CARE EDUCATION/TRAINING PROGRAM

## 2024-07-31 PROCEDURE — 97116 GAIT TRAINING THERAPY: CPT

## 2024-07-31 PROCEDURE — 97535 SELF CARE MNGMENT TRAINING: CPT

## 2024-07-31 PROCEDURE — 1200000000 HC SEMI PRIVATE

## 2024-07-31 PROCEDURE — 81001 URINALYSIS AUTO W/SCOPE: CPT

## 2024-07-31 PROCEDURE — 87077 CULTURE AEROBIC IDENTIFY: CPT

## 2024-07-31 RX ADMIN — METRONIDAZOLE 500 MG: 500 INJECTION, SOLUTION INTRAVENOUS at 05:06

## 2024-07-31 RX ADMIN — METRONIDAZOLE 500 MG: 500 INJECTION, SOLUTION INTRAVENOUS at 13:18

## 2024-07-31 RX ADMIN — ENOXAPARIN SODIUM 40 MG: 100 INJECTION SUBCUTANEOUS at 08:48

## 2024-07-31 RX ADMIN — HYDRALAZINE HYDROCHLORIDE 10 MG: 20 INJECTION INTRAMUSCULAR; INTRAVENOUS at 08:49

## 2024-07-31 RX ADMIN — ASPIRIN 81 MG 81 MG: 81 TABLET ORAL at 08:48

## 2024-07-31 RX ADMIN — BUSPIRONE HYDROCHLORIDE 5 MG: 5 TABLET ORAL at 08:48

## 2024-07-31 RX ADMIN — SODIUM CHLORIDE, PRESERVATIVE FREE 10 ML: 5 INJECTION INTRAVENOUS at 20:30

## 2024-07-31 RX ADMIN — CARBIDOPA AND LEVODOPA 3 TABLET: 25; 100 TABLET ORAL at 08:48

## 2024-07-31 RX ADMIN — WATER 1000 MG: 1 INJECTION INTRAMUSCULAR; INTRAVENOUS; SUBCUTANEOUS at 20:27

## 2024-07-31 RX ADMIN — LOSARTAN POTASSIUM 100 MG: 100 TABLET, FILM COATED ORAL at 08:48

## 2024-07-31 RX ADMIN — METRONIDAZOLE 500 MG: 500 INJECTION, SOLUTION INTRAVENOUS at 20:25

## 2024-07-31 RX ADMIN — NIFEDIPINE 30 MG: 30 TABLET, FILM COATED, EXTENDED RELEASE ORAL at 08:48

## 2024-07-31 RX ADMIN — CARBIDOPA AND LEVODOPA 3 TABLET: 25; 100 TABLET ORAL at 20:18

## 2024-07-31 RX ADMIN — CARBIDOPA AND LEVODOPA 3 TABLET: 25; 100 TABLET ORAL at 13:15

## 2024-07-31 RX ADMIN — CARBIDOPA AND LEVODOPA 3 TABLET: 25; 100 TABLET ORAL at 17:41

## 2024-07-31 RX ADMIN — BUSPIRONE HYDROCHLORIDE 5 MG: 5 TABLET ORAL at 20:18

## 2024-07-31 ASSESSMENT — PAIN SCALES - GENERAL
PAINLEVEL_OUTOF10: 0
PAINLEVEL_OUTOF10: 0

## 2024-07-31 NOTE — CONSULTS
[facility]    Received referral for patient. Spoke with patient's  Hero. Gave information on HOC at home per 's request. Patient will be going skilled and then will consider hospice when returning home. Concern for  providing all care at home due to his own personal decline. Provided information on how to follow up with HOC with questions or concerns. Updated SW.    Thank you for the referral,  Kaci Hawley  HOC Admissions RN  [phone]

## 2024-07-31 NOTE — PROGRESS NOTES
Straight cath done at this time. RN took urine 900 ml out, elaine cloudy, malodorous. External catheter back on. Bed exit alarm on.Pt resting on a bed.

## 2024-07-31 NOTE — CARE COORDINATION
7/31 Hospice referral noted in chart. Patient's  wants to get information only. Referral sent to [facility] per Mr. Sellers. Electronically signed by Nafisa Bustos RN on 7/31/2024 at 2:31 PM

## 2024-07-31 NOTE — PROGRESS NOTES
for safety  Problem Solving: Decreased awareness of errors  Insights: Not aware of deficits  Initiation: Requires cues for all  Sequencing: Requires cues for all  Orientation  Overall Orientation Status: Impaired  Orientation Level: Disoriented to time;Oriented to person;Oriented to place;Disoriented to situation (pt stated \"have a good Thanksgiving\" as therapists were leaving the room)               Static Sitting Balance Exercises: SBA-Min A for seated balance for ~20-25 while completing grooming tasks  Education Given To: Patient  Education Provided: Role of Therapy;Plan of Care;Transfer Training;Orientation;Fall Prevention Strategies;Mobility Training;Family Education;ADL Adaptive Strategies;Energy Conservation  Education Method: Verbal;Demonstration  Barriers to Learning: Cognition;Hearing  Education Outcome: Continued education needed       AM-PAC - ADL  AM-PAC Daily Activity - Inpatient   How much help is needed for putting on and taking off regular lower body clothing?: Total  How much help is needed for bathing (which includes washing, rinsing, drying)?: A Lot  How much help is needed for toileting (which includes using toilet, bedpan, or urinal)?: A Lot  How much help is needed for putting on and taking off regular upper body clothing?: A Little  How much help is needed for taking care of personal grooming?: A Little  How much help for eating meals?: None  AM-[facility] Inpatient Daily Activity Raw Score: 15  AM-PAC Inpatient ADL T-Scale Score : 34.69  ADL Inpatient CMS 0-100% Score: 56.46  ADL Inpatient CMS G-Code Modifier : CK      Goals  Short Term Goals  Time Frame for Short Term Goals: prior to d/c: goals ongoing 7/31  Short Term Goal 1: Megan fxl tx and fxl mobility to LRAD in prep to complete ADL routine  Short Term Goal 2: Megan toileting  Short Term Goal 3: Megan LB dressing/bathing  Short Term Goal 4: Megan standing x2-3 minutes to complete ADL task to improve endurance/balance  Short Term Goal 5: Pt will  complete BUE exercises in all planes x10 reps each in prep to complete ADL task with independence  Long Term Goals  Time Frame for Long Term Goals : STGs=LTGs  Patient Goals   Patient goals : pt did not state       Therapy Time   Individual Concurrent Group Co-treatment   Time In 1249         Time Out 1327         Minutes 38         Timed Code Treatment Minutes: 38 Minutes       Ruben Rubio, OTR/L [id number]

## 2024-07-31 NOTE — PROGRESS NOTES
Physician Progress Note      PATIENT:               ELIOT SKINNER  [facility] #:                  [id number]  :                       1940  ADMIT DATE:       2024 1:57 PM  DISCH DATE:  RESPONDING  PROVIDER #:        sAa Carrion MD          QUERY TEXT:    Patient with advanced dementia admitted with generalized weakness. Noted   documentation of suspected UTI in H&P and progress notes.  UA showed no   bacteria, negative nitrite, trace leuko esterase.  No culture indicated per   lab protocol.  In order to support the diagnosis of UTI, please include   additional clinical indicators in your documentation.  Or please document if   the diagnosis of UTI has been ruled out after further study.    The medical record reflects the following:  Risk Factors: hx UTI with chronic suppressive Ciprofloxacin  Clinical Indicators:  UA showed no bacteria, negative nitrite, trace leuko   esterase.  No culture indicated per lab protocol  Treatment: UA, Rocephin stopped  Options provided:  -- UTI present as evidenced by, Please document evidence.  -- UTI was ruled out  -- Other - I will add my own diagnosis  -- Disagree - Not applicable / Not valid  -- Disagree - Clinically unable to determine / Unknown  -- Refer to Clinical Documentation Reviewer    PROVIDER RESPONSE TEXT:    UTI was ruled out after study.    Query created by: She Cruz on 2024 3:00 AM      QUERY TEXT:    Patient with Parkinson's and advanced dementia admitted with generalized   weakness. Noted to have poor PO intake prior to admission with unintentional   weight loss.  Per dietician, meets criteria for moderate malnutrition.  If   possible, please document in progress notes and discharge summary if you are   evaluating and /or treating any of the following:    The medical record reflects the following:  Risk Factors: Parkinson's, poor PO intake prior to admission, unintentional   weight loss  Clinical Indicators: Per dietician assessment,  meets criteria for moderate   malnutrition based on:  Energy Intake:  50% or less of estimated energy requirements for 5 or more   days  Body Fat Loss:  Mild body fat loss Buccal region  Muscle Mass Loss:  Mild muscle mass loss Temples (temporalis)  Fluid Accumulation:  Mild Extremities  Treatment: dietician assessment, recommendation to liberalize diet with   nutritional supplments    ASPEN Criteria:    https://aspenjournals.onlinelibrary.berger.com/doi/full/10.1177/337195777343978  5  Options provided:  -- Moderate malnutrition  -- Other - I will add my own diagnosis  -- Disagree - Not applicable / Not valid  -- Disagree - Clinically unable to determine / Unknown  -- Refer to Clinical Documentation Reviewer    PROVIDER RESPONSE TEXT:    This patient has moderate malnutrition.    Query created by: She Cruz on 7/31/2024 3:00 AM      QUERY TEXT:    Patient with advanced dementia admitted with generalized weakness.  Per 7/29   progress note \"Hyperbilirubinemia assess for conjugated versus unconjugated.    GI consulted added Flagyl for cholecystitis concerns.  Ultrasound has been   ordered for the abdomen is negative.\"  In order to support the diagnosis of   cholecystitis please include additional clinical indicators in your   documentation.  Or please document if the diagnosis of cholecystitis has been   ruled out after further study.    The medical record reflects the following:  Risk Factors: hyperbilirubinemia  Clinical Indicators: RUQ US showed \"No sonographic abnormality.\"  Treatment: GI consult, Flagyl, US RUQ  Options provided:  -- Cholecystitis present as evidenced by, Please document evidence.  -- Cholecystitis was ruled out  -- Other - I will add my own diagnosis  -- Disagree - Not applicable / Not valid  -- Disagree - Clinically unable to determine / Unknown  -- Refer to Clinical Documentation Reviewer    PROVIDER RESPONSE TEXT:    Cholecystitis was ruled out after study.    Query created by:

## 2024-07-31 NOTE — PLAN OF CARE
Problem: Discharge Planning  Goal: Discharge to home or other facility with appropriate resources  Outcome: Progressing     Problem: Safety - Adult  Goal: Free from fall injury  Outcome: Progressing     Problem: Confusion  Goal: Confusion, delirium, dementia, or psychosis is improved or at baseline  Description: INTERVENTIONS:  1. Assess for possible contributors to thought disturbance, including medications, impaired vision or hearing, underlying metabolic abnormalities, dehydration, psychiatric diagnoses, and notify attending LIP  2. [place] high risk fall precautions, as indicated  3. Provide frequent short contacts to provide reality reorientation, refocusing and direction  4. Decrease environmental stimuli, including noise as appropriate  5. Monitor and intervene to maintain adequate nutrition, hydration, elimination, sleep and activity  6. If unable to ensure safety without constant attention obtain sitter and review sitter guidelines with assigned personnel  7. Initiate Psychosocial CNS and Spiritual Care consult, as indicated  Outcome: Progressing     Problem: Neurosensory - Adult  Goal: Achieves stable or improved neurological status  Outcome: Progressing  Goal: Achieves maximal functionality and self care  Outcome: Progressing     Problem: Skin/Tissue Integrity - Adult  Goal: Skin integrity remains intact  Outcome: Progressing  Goal: Oral mucous membranes remain intact  Outcome: Progressing     Problem: Musculoskeletal - Adult  Goal: Return mobility to safest level of function  Outcome: Progressing  Goal: Maintain proper alignment of affected body part  Outcome: Progressing  Goal: Return ADL status to a safe level of function  Outcome: Progressing     Problem: Genitourinary - Adult  Goal: Absence of urinary retention  Outcome: Progressing     Problem: Skin/Tissue Integrity  Goal: Absence of new skin breakdown  Description: 1.  Monitor for areas of redness and/or skin breakdown  2.  Assess vascular

## 2024-07-31 NOTE — PLAN OF CARE
Problem: Discharge Planning  Goal: Discharge to home or other facility with appropriate resources  7/30/2024 2250 by Chela Ro RN  Outcome: Progressing  7/30/2024 1015 by Eric Bonds RN  Outcome: Progressing  Flowsheets  Taken 7/30/2024 1015 by Eric Bonds RN  Discharge to home or other facility with appropriate resources: Identify barriers to discharge with patient and caregiver  Taken 7/29/2024 2330 by Chela Ro RN  Discharge to home or other facility with appropriate resources: Identify barriers to discharge with patient and caregiver     Problem: Safety - Adult  Goal: Free from fall injury  7/30/2024 2250 by Chela Ro RN  Outcome: Progressing  Flowsheets (Taken 7/30/2024 2248)  Free From Fall Injury: Instruct family/caregiver on patient safety  7/30/2024 1015 by Eric Bonds RN  Outcome: Progressing  Flowsheets (Taken 7/30/2024 1015)  Free From Fall Injury: Instruct family/caregiver on patient safety  Note: Potential fall hazards identified and removed accordingly. Pt educated to use call light for assistance. Bed locked, in lowest position with alarm on.      Problem: Confusion  Goal: Confusion, delirium, dementia, or psychosis is improved or at baseline  Description: INTERVENTIONS:  1. Assess for possible contributors to thought disturbance, including medications, impaired vision or hearing, underlying metabolic abnormalities, dehydration, psychiatric diagnoses, and notify attending LIP  2. [place] high risk fall precautions, as indicated  3. Provide frequent short contacts to provide reality reorientation, refocusing and direction  4. Decrease environmental stimuli, including noise as appropriate  5. Monitor and intervene to maintain adequate nutrition, hydration, elimination, sleep and activity  6. If unable to ensure safety without constant attention obtain sitter and review sitter guidelines with assigned personnel  7. Initiate Psychosocial CNS and Spiritual Care  standing, transferring and ambulating with or without assistive devices   Assist with transfers and ambulation using safe patient handling equipment as needed   Ensure adequate protection for wounds/incisions during mobilization   Obtain physical therapy/occupational therapy consults as needed   Instruct patient/family in ordered activity level  Goal: Maintain proper alignment of affected body part  Outcome: Progressing  Goal: Return ADL status to a safe level of function  Outcome: Progressing     Problem: Genitourinary - Adult  Goal: Absence of urinary retention  7/30/2024 2250 by Chela Ro RN  Outcome: Progressing  7/30/2024 1015 by Eric Bonds RN  Outcome: Progressing  Flowsheets (Taken 7/30/2024 1015)  Absence of urinary retention:   Assess patient’s ability to void and empty bladder   Monitor intake/output and perform bladder scan as needed     Problem: Skin/Tissue Integrity  Goal: Absence of new skin breakdown  Description: 1.  Monitor for areas of redness and/or skin breakdown  2.  Assess vascular access sites hourly  3.  Every 4-6 hours minimum:  Change oxygen saturation probe site  4.  Every 4-6 hours:  If on nasal continuous positive airway pressure, respiratory therapy assess nares and determine need for appliance change or resting period.  7/30/2024 2250 by Chela Ro RN  Outcome: Progressing  7/30/2024 1015 by Eric Bonds RN  Outcome: Progressing  Note: Skin assessment done per shift. Pt educated on importance of frequent positioning. Pt verbalizes understanding.      Problem: Nutrition Deficit:  Goal: Optimize nutritional status  Outcome: Progressing     Problem: ABCDS Injury Assessment  Goal: Absence of physical injury  7/30/2024 2250 by Chela Ro RN  Outcome: Progressing  7/30/2024 1015 by Eric Bonds RN  Outcome: Progressing  Note: Pt educated on use of call light for assistance. Pt educated to wait for assistance. Appropriate ambulatory aid provided and used.

## 2024-07-31 NOTE — CONSULTS
PALLIATIVE MEDICINE CONSULTATION     Patient name:Audelia Sellers   MRN:[id number]    :1940  Room/Bed:D4M-9932/4126-01   LOS: 4 days         Date of consult:2024    Inpatient consult to Palliative Care  Consult performed by: Bita Lugo APRN - CNP  Consult ordered by: Asa Carrion MD  Reason for consult: Goals of care, end stage disease, psychosocial distress.        ASSESSMENT/RECOMMENDATIONS     84 y.o. female with altered mental status and weakness secondary to underlying acute and chronic conditions.     Symptom Management:  Altered mental status - Secondary to UTI, improving.   UTI - Recurrent in nature, discussed rationale in the setting of advanced dementia. IV antibiotics per attending.  Debility - Progressive secondary to underlying co morbidities. PT/OT active, plans for SNF.   Goals of Care - See below.    Patient/Family Goals of Care :    I explained the highly personal nature of deciding how to approach serious illness, and outlined two extremes--continuing disease-focused, morbidity-inducing treatment with the possibility of prolonging life vs. focusing on comfort/quality of life while allowing disease progression and natural death. Emphasis was placed on the absence of a \"right\" answer, as opposed to making good decisions based on personal preferences and circumstances, with ongoing reevaluation and adjustment in treatment goals as the clinical course unfolds. Patients  feels she has been suffering over the last several months, but really started 2-3 years ago. He would not wish to prolong her life given her decline. Has questions about hospice care in the home, discussed at length. Given the patients physical decline, Mr. Sellers is unable to care for her physically. He is hoping she can return home after therapy, however is open to placement if needed. We discussed a referral to hospice to follow post skilled stay. Patient utilized [facility]

## 2024-07-31 NOTE — CARE COORDINATION
7/31 Met with  at bedside to discuss SNF choices. Referrals sent to [facility] and [facility].Electronically signed by Nafisa Bustos RN on 7/31/2024 at 12:03 PM

## 2024-07-31 NOTE — PROGRESS NOTES
Physical Therapy  Facility/Department: [address] MED SURG  Physical Therapy Treatment session    Name: Audelia Sellers  : 1940  MRN: [id number]  Date of Service: 2024    Audelia Sellers scored a 12 on the AM-PAC short mobility form. Current research shows that an AM-PAC score of 17 or less is typically not associated with a discharge to the patient's home setting. Based on the patient's AM-PAC score and their current functional mobility deficits, it is recommended that the patient have 3-5 sessions per week of Physical Therapy at d/c to increase the patient's independence.  Please see assessment section for further patient specific details.    If patient discharges prior to next session this note will serve as a discharge summary.  Please see below for the latest assessment towards goals.    Discharge Recommendations:  Patient would benefit from continued therapy after discharge (3-5x/wk)   PT Equipment Recommendations  Equipment Needed: No  Other: defer to next level of care      Patient Diagnosis(es): The primary encounter diagnosis was Generalized weakness. Diagnoses of Elevated troponin, Elevated brain natriuretic peptide (BNP) level, Hypomagnesemia, and Altered mental status, unspecified altered mental status type were also pertinent to this visit.  Past Medical History:  has a past medical history of Arthritis, Cataract, Chronic diastolic heart failure (HCC), High blood pressure, Hyperlipidemia, and stress test.  Past Surgical History:  has a past surgical history that includes joint replacement and eye surgery ().    Assessment   Assessment: The pt is an 83 yo female who presented to the ED with generalized weakness. GI consulted. PMHx: arth, CHF, HTN, parkinson's, advanced dementia, multilpe UTI's, chronic Hyperbilirubinemia   The pt is originally from home with spouse. The pt is a poor historian and per  he was needing to assist her more with all care, PTA. Today, the pt is pleasant but  place.  Comments: c/o dizziness and wanting to sit down  More Ambulation?: No     Balance  Sitting - Static: Fair  Sitting - Dynamic: Fair  Standing - Static: Fair  Standing - Dynamic: Fair  Comments: Sat at the EOB ~ 20-25 minutes to complete upper body bathing and dressing as well as teeth brushing and placing yane.  Needed fluctuating assist of Close Supervision to Min A as Pt. had a posterior lean and occasionally tried to lie down.                                                        AM-PAC - Mobility    AM-PAC Basic Mobility - Inpatient   How much help is needed turning from your back to your side while in a flat bed without using bedrails?: A Lot  How much help is needed moving from lying on your back to sitting on the side of a flat bed without using bedrails?: A Lot  How much help is needed moving to and from a bed to a chair?: A Lot  How much help is needed standing up from a chair using your arms?: A Little  How much help is needed walking in hospital room?: A Lot  How much help is needed climbing 3-5 steps with a railing?: Total  AM-PAC Inpatient Mobility Raw Score : 12  AM-PAC Inpatient T-Scale Score : 35.33  Mobility Inpatient CMS 0-100% Score: 68.66  Mobility Inpatient CMS G-Code Modifier : CL              Goals  Short Term Goals  Time Frame for Short Term Goals: upon d/c  Short Term Goal 1: Bed mobility with min A.  Short Term Goal 2: Seated EOB with SBA.  Short Term Goal 3: Transfers sit <> stand with min A of 1-2.  Short Term Goal 4: Ambulate with RW 10 feet with min/mod A of 1-2.  Patient Goals   Patient Goals : none stated       Education  Patient Education  Education Given To: Patient;Family  Education Provided: Role of Therapy;Plan of Care;Transfer Training;Precautions;Equipment;Orientation  Education Method: Verbal  Barriers to Learning: Cognition  Education Outcome: Continued education needed;Unable to demonstrate understanding      Therapy Time   Individual Concurrent Group Co-treatment

## 2024-07-31 NOTE — PROGRESS NOTES
V2.0    [facility] [place] Note      Name:  Audelia Sellers /Age/Sex: 1940  (84 y.o. female)   MRN & CSN:  [id number] & [id number] Encounter Date/Time: 2024 2:05 PM EDT   Location:  [id number] PCP: Kaci Montero MD     Attending:Asa Carrion MD       Hospital Day: 5    Assessment and Recommendations   Audelia Sellers is a 84 y.o. female with pmh of hypertension, advanced dementia, mood disorder, hyperlipidemia and Parkinson's disease who presents with Generalized weakness patient was found to have urinary tract infection and is currently being treated with Rocephin and acute cholecystitis being treated with Flagyl IV    Patient was examined this morning with no complaints.  Has been at the bedside would like her placed since he is unable to care for her.  Patient has been retaining urine.  Will bladder scan patient and straight cath as needed for 300+ cc of urine retention      Plan:   Generalized  weakness and altered mental status.  The patient was found to have a urinary tract infection.  Patient is currently on treatment with Rocephin IV and Flagyl IV.  Due to patient's advanced dementia and her current weakness and her medical history specially with Parkinson's it was greatly advised that patient is to be discharged to a long-term facility.  Urinary tract infection patient is currently on Rocephin will continue to monitor follow her symptoms  Hyperbilirubinemia General surgery was consulted and they recommended that patient be placed on Flagyl for most likely cholecystitis.  Hypertension will continue to follow and monitor patient's symptoms  Parkinson's disease patient is currently on levodopa carbidopa will continue to follow monitor for further management evaluation.    Diet ADULT DIET; Regular; 5 carb choices (75 gm/meal)  ADULT ORAL NUTRITION SUPPLEMENT; Breakfast, Lunch, Dinner; Frozen Oral Supplement   DVT Prophylaxis [x] Lovenox, []  Heparin, [] SCDs, [] Ambulation,  [] Eliquis, []

## 2024-07-31 NOTE — PROGRESS NOTES
Pt is not peeing. RN did bladder scan at the bedside, 975 ml urine output found. Secured the message through perfect serve, got an order for straight catheterization,

## 2024-08-01 PROCEDURE — 6370000000 HC RX 637 (ALT 250 FOR IP): Performed by: STUDENT IN AN ORGANIZED HEALTH CARE EDUCATION/TRAINING PROGRAM

## 2024-08-01 PROCEDURE — 6360000002 HC RX W HCPCS: Performed by: STUDENT IN AN ORGANIZED HEALTH CARE EDUCATION/TRAINING PROGRAM

## 2024-08-01 PROCEDURE — 97530 THERAPEUTIC ACTIVITIES: CPT

## 2024-08-01 PROCEDURE — 6360000002 HC RX W HCPCS: Performed by: NURSE PRACTITIONER

## 2024-08-01 PROCEDURE — 1200000000 HC SEMI PRIVATE

## 2024-08-01 PROCEDURE — 97535 SELF CARE MNGMENT TRAINING: CPT

## 2024-08-01 PROCEDURE — 94760 N-INVAS EAR/PLS OXIMETRY 1: CPT

## 2024-08-01 PROCEDURE — 2580000003 HC RX 258: Performed by: STUDENT IN AN ORGANIZED HEALTH CARE EDUCATION/TRAINING PROGRAM

## 2024-08-01 PROCEDURE — 2580000003 HC RX 258: Performed by: NURSE PRACTITIONER

## 2024-08-01 PROCEDURE — 6370000000 HC RX 637 (ALT 250 FOR IP): Performed by: NURSE PRACTITIONER

## 2024-08-01 RX ORDER — QUETIAPINE FUMARATE 25 MG/1
25 TABLET, FILM COATED ORAL ONCE
Status: COMPLETED | OUTPATIENT
Start: 2024-08-02 | End: 2024-08-01

## 2024-08-01 RX ADMIN — CARBIDOPA AND LEVODOPA 3 TABLET: 25; 100 TABLET ORAL at 13:12

## 2024-08-01 RX ADMIN — NIFEDIPINE 30 MG: 30 TABLET, FILM COATED, EXTENDED RELEASE ORAL at 09:10

## 2024-08-01 RX ADMIN — CARBIDOPA AND LEVODOPA 3 TABLET: 25; 100 TABLET ORAL at 18:06

## 2024-08-01 RX ADMIN — LOSARTAN POTASSIUM 100 MG: 100 TABLET, FILM COATED ORAL at 09:10

## 2024-08-01 RX ADMIN — BUSPIRONE HYDROCHLORIDE 5 MG: 5 TABLET ORAL at 09:10

## 2024-08-01 RX ADMIN — ACETAMINOPHEN 325MG 650 MG: 325 TABLET ORAL at 20:09

## 2024-08-01 RX ADMIN — SODIUM CHLORIDE, PRESERVATIVE FREE 10 ML: 5 INJECTION INTRAVENOUS at 20:10

## 2024-08-01 RX ADMIN — QUETIAPINE FUMARATE 25 MG: 25 TABLET ORAL at 23:57

## 2024-08-01 RX ADMIN — METRONIDAZOLE 500 MG: 500 INJECTION, SOLUTION INTRAVENOUS at 05:18

## 2024-08-01 RX ADMIN — ENOXAPARIN SODIUM 40 MG: 100 INJECTION SUBCUTANEOUS at 09:10

## 2024-08-01 RX ADMIN — ASPIRIN 81 MG 81 MG: 81 TABLET ORAL at 09:10

## 2024-08-01 RX ADMIN — BUSPIRONE HYDROCHLORIDE 5 MG: 5 TABLET ORAL at 20:10

## 2024-08-01 RX ADMIN — CARBIDOPA AND LEVODOPA 3 TABLET: 25; 100 TABLET ORAL at 20:09

## 2024-08-01 RX ADMIN — CARBIDOPA AND LEVODOPA 3 TABLET: 25; 100 TABLET ORAL at 09:10

## 2024-08-01 RX ADMIN — ZIPRASIDONE MESYLATE 10 MG: 20 INJECTION, POWDER, LYOPHILIZED, FOR SOLUTION INTRAMUSCULAR at 01:28

## 2024-08-01 ASSESSMENT — PAIN SCALES - GENERAL
PAINLEVEL_OUTOF10: 0
PAINLEVEL_OUTOF10: 0
PAINLEVEL_OUTOF10: 1
PAINLEVEL_OUTOF10: 3
PAINLEVEL_OUTOF10: 0

## 2024-08-01 ASSESSMENT — PAIN DESCRIPTION - LOCATION: LOCATION: GENERALIZED

## 2024-08-01 NOTE — PROGRESS NOTES
OT informed this nurse that patient reported some tinging in left hand during their session. This writer went to assess patient. Patient observed sitting up in recliner in room eating lunch with  in room. Patient denied any tingling or pain in either extremity.  Cap refill brisk bilaterally. ROM WNL for this patient.  reports that patient spoke of the tingling while with OT and has not complained since. Patient remains sitting up in recliner, call light within reach. Wheels locked, chair alarm in place  at chair side.

## 2024-08-01 NOTE — PROGRESS NOTES
Patient in bed resting in bed at this time.Patient is alert  x 2.  Prn given per md order for  restlessness pt attempting to get out of bed. Assessment completed. VS WNL.. IV capped and flushed. Fall precautions in place. Call light within reach.

## 2024-08-01 NOTE — PROGRESS NOTES
Physician Progress Note      PATIENT:               ELIOT SKINNER  CSN #:                  [id number]  :                       1940  ADMIT DATE:       2024 1:57 PM  DISCH DATE:  RESPONDING  PROVIDER #:        Asa Carrion MD          QUERY TEXT:    Pt admitted with generalized weakness.  Noted to have Parkinson's, advanced   dementia, moderate malnutrition, and functional decline.  UTI and   cholecystitis ruled out per query responses.  Palliative care notes   progressive debility.  Hospice consulted and will follow-up with pt's    following skilled placement. If possible, please document in the progress   notes and discharge summary if you are evaluating and / or treating any of the   following:    The medical record reflects the following:  Risk Factors: 84 yr old, Parkinson's, advanced dementia  Clinical Indicators:  generalized weakness with functional decline; per   palliative care notes \" reports a dramatic decline over the last 2   months\"  Treatment: PT, OT, palliative care, hospice referral  Options provided:  -- Age Related Physical Debility  -- Frailty  -- Generalized weakness and functional decline due to Parkinson's and advanced   dementia  -- Generalized weakness and functional decline due to, Please document other   cause  -- Other - I will add my own diagnosis  -- Disagree - Not applicable / Not valid  -- Disagree - Clinically unable to determine / Unknown  -- Refer to Clinical Documentation Reviewer    PROVIDER RESPONSE TEXT:    Generalized weakness and functional decline due to Parkinson's and advanced   dementia    Query created by: She Cruz on 2024 4:18 AM      Electronically signed by:  Asa Carrion MD 2024 6:10 PM

## 2024-08-01 NOTE — PLAN OF CARE
Problem: Discharge Planning  Goal: Discharge to home or other facility with appropriate resources  7/31/2024 2214 by Sury Foster RN  Outcome: Progressing  7/31/2024 1308 by Aniyah Belcher RN  Outcome: Progressing     Problem: Safety - Adult  Goal: Free from fall injury  7/31/2024 2214 by Sury Foster RN  Outcome: Progressing  Flowsheets (Taken 7/31/2024 2000)  Free From Fall Injury: Instruct family/caregiver on patient safety  7/31/2024 1308 by Aniyah Belcher RN  Outcome: Progressing     Problem: Confusion  Goal: Confusion, delirium, dementia, or psychosis is improved or at baseline  Description: INTERVENTIONS:  1. Assess for possible contributors to thought disturbance, including medications, impaired vision or hearing, underlying metabolic abnormalities, dehydration, psychiatric diagnoses, and notify attending LIP  2. [place] high risk fall precautions, as indicated  3. Provide frequent short contacts to provide reality reorientation, refocusing and direction  4. Decrease environmental stimuli, including noise as appropriate  5. Monitor and intervene to maintain adequate nutrition, hydration, elimination, sleep and activity  6. If unable to ensure safety without constant attention obtain sitter and review sitter guidelines with assigned personnel  7. Initiate Psychosocial CNS and Spiritual Care consult, as indicated  7/31/2024 2214 by Sury Foster RN  Outcome: Progressing  7/31/2024 1308 by Aniyah Belcher RN  Outcome: Progressing     Problem: Neurosensory - Adult  Goal: Achieves stable or improved neurological status  7/31/2024 2214 by Sury Foster RN  Outcome: Progressing  Flowsheets (Taken 7/31/2024 2031)  Achieves stable or improved neurological status: Assess for and report changes in neurological status  7/31/2024 1308 by Aniyah Belcher RN  Outcome: Progressing  Goal: Achieves maximal functionality and self care  7/31/2024 2214 by Sury Foster RN  Outcome: Progressing  Flowsheets  Stable  Cont med (Taken 7/31/2024 2031)  Achieves maximal functionality and self care: Monitor swallowing and airway patency with patient fatigue and changes in neurological status  7/31/2024 1308 by Aniyah Belcher RN  Outcome: Progressing     Problem: Skin/Tissue Integrity - Adult  Goal: Skin integrity remains intact  7/31/2024 2214 by Sury Foster RN  Outcome: Progressing  Flowsheets (Taken 7/31/2024 2000)  Skin Integrity Remains Intact: Monitor for areas of redness and/or skin breakdown  7/31/2024 1308 by Aniyah Belcher RN  Outcome: Progressing  Goal: Oral mucous membranes remain intact  7/31/2024 2214 by Sury Foster RN  Outcome: Progressing  Flowsheets (Taken 7/31/2024 2000)  Oral Mucous Membranes Remain Intact: Assess oral mucosa and hygiene practices  7/31/2024 1308 by Aniyah Belcher RN  Outcome: Progressing     Problem: Musculoskeletal - Adult  Goal: Return mobility to safest level of function  7/31/2024 2214 by Sury Foster RN  Outcome: Progressing  7/31/2024 1308 by Aniyah Belcher RN  Outcome: Progressing  Goal: Maintain proper alignment of affected body part  7/31/2024 1308 by Aniyah Belcher RN  Outcome: Progressing  Goal: Return ADL status to a safe level of function  7/31/2024 1308 by Aniyah Belcher RN  Outcome: Progressing     Problem: Genitourinary - Adult  Goal: Absence of urinary retention  7/31/2024 2214 by Sury Foster RN  Outcome: Progressing  Flowsheets (Taken 7/31/2024 2031)  Absence of urinary retention: Assess patient’s ability to void and empty bladder  7/31/2024 1308 by Aniyah Belcher RN  Outcome: Progressing     Problem: Skin/Tissue Integrity  Goal: Absence of new skin breakdown  Description: 1.  Monitor for areas of redness and/or skin breakdown  2.  Assess vascular access sites hourly  3.  Every 4-6 hours minimum:  Change oxygen saturation probe site  4.  Every 4-6 hours:  If on nasal continuous positive airway pressure, respiratory therapy assess nares and determine need for

## 2024-08-01 NOTE — CARE COORDINATION
8/1 Plan: To [facility] when stable for discharge. Nicolás precert good thru 8/5. Need HENs. Electronically signed by Nafisa Bustos RN on 8/1/2024 at 3:17 PM

## 2024-08-01 NOTE — PROGRESS NOTES
is currently on levodopa carbidopa will continue to follow monitor for further management evaluation.    Diet ADULT DIET; Regular; 5 carb choices (75 gm/meal)  ADULT ORAL NUTRITION SUPPLEMENT; Breakfast, Lunch, Dinner; Frozen Oral Supplement   DVT Prophylaxis [x] Lovenox, []  Heparin, [] SCDs, [] Ambulation,  [] Eliquis, [] Xarelto  [] Coumadin   Code Status DNR-CC   Disposition From: Home  Expected Disposition: Long-term facility versus rehab  Estimated Date of Discharge: 1 to 2 days  Patient requires continued admission due to IV antibiotics and weekly   Surrogate Decision Maker/ POA       Personally reviewed Lab Studies and Imaging       Medical Decision Making:  The following items were considered in medical decision making:  Discussion of patient care with other providers  Reviewed clinical lab tests  Reviewed radiology tests  Reviewed other diagnostic tests/interventions  Independent review of radiologic images  Microbiology cultures and other micro tests reviewed      Subjective:     Chief Complaint: Weakness was found to have UTI.     Audelia Sellers is a 84 y.o. female who presents with generalized weakness was found to have urinary tract infection.      Review of Systems:      Pertinent positives and negatives discussed in HPI    Objective:     Intake/Output Summary (Last 24 hours) at 8/1/2024 1101  Last data filed at 7/31/2024 2030  Gross per 24 hour   Intake 10 ml   Output 650 ml   Net -640 ml        Vitals:   Vitals:    08/01/24 0000 08/01/24 0515 08/01/24 0830 08/01/24 0854   BP: (!) 158/69 (!) 149/67 137/66 (!) 165/76   Pulse: 89  77 94   Resp: 18 18 18 16   Temp: 98.1 °F (36.7 °C) 98.8 °F (37.1 °C) 98.7 °F (37.1 °C) 98.3 °F (36.8 °C)   TempSrc: Oral Axillary Axillary Oral   SpO2: 97% 97% 93% 93%   Weight:  71.4 kg (157 lb 6.5 oz)     Height:             Physical Exam:      Physical Exam Performed:    BP (!) 165/76   Pulse 94   Temp 98.3 °F (36.8 °C) (Oral)   Resp 16   Ht 1.651 m (5' 5\")   Wt 71.4 kg  01/03/2024 11:46 AM    TSH 4.62 07/06/2023 07:46 AM     Troponin: No results found for: \"TROPONINT\"  Lactic Acid:   No results for input(s): \"LACTA\" in the last 72 hours.    BNP:   No results for input(s): \"PROBNP\" in the last 72 hours.    UA:  Lab Results   Component Value Date/Time    NITRU POSITIVE 07/31/2024 10:42 PM    COLORU DARK YELLOW 07/31/2024 10:42 PM    PHUR 7.0 07/31/2024 10:42 PM    PHUR 6.0 11/28/2022 03:27 PM    PHUR 7.5 10/31/2022 10:29 AM    LABCAST 0-1 Hyaline 05/11/2016 09:18 AM    WBCUA 52 07/31/2024 10:42 PM    RBCUA 7 07/31/2024 10:42 PM    MUCUS Rare 11/09/2020 07:58 AM    YEAST Present 07/31/2024 10:42 PM    BACTERIA None Seen 07/31/2024 10:42 PM    CLARITYU Clear 07/31/2024 10:42 PM    SPECGRAV 1.015 11/28/2022 03:27 PM    LEUKOCYTESUR MODERATE 07/31/2024 10:42 PM    UROBILINOGEN 1.0 07/31/2024 10:42 PM    BILIRUBINUR Negative 07/31/2024 10:42 PM    BLOODU Negative 07/31/2024 10:42 PM    GLUCOSEU Negative 07/31/2024 10:42 PM    GLUCOSEU NEGATIVE 03/30/2012 09:15 AM    KETUA 15 07/31/2024 10:42 PM     Urine Cultures:   Lab Results   Component Value Date/Time    LABURIN No growth at 18 to 36 hours 05/11/2021 07:49 AM     Blood Cultures:   Lab Results   Component Value Date/Time    BC No Growth after 4 days of incubation. 07/27/2024 03:43 PM     Lab Results   Component Value Date/Time    BLOODCULT2 No Growth after 4 days of incubation. 07/27/2024 03:43 PM     Organism:   Lab Results   Component Value Date/Time    ORG Escherichia coli 05/15/2017 02:38 PM         Electronically signed by Asa Carrion MD on 8/1/2024 at 11:01 AM  Comment: Please note this report has been produced using speech recognition software and may contain errors related to that system including errors in grammar, punctuation, and spelling, as well as words and phrases that may be inappropriate. If there are any questions or concerns, please feel free to contact the dictating provider for clarification.

## 2024-08-01 NOTE — DISCHARGE INSTR - COC
Continuity of Care Form    Patient Name: Audelia Sellers   :  1940  MRN:  [id number]    Admit date:  2024  Discharge date:  2024    Code Status Order: DNR-CC   Advance Directives:     Admitting Physician:  Nora Nieves MD  PCP: Kaci Montero MD    Discharging Nurse: Maria De Jesus GILL  Discharging Hospital Unit/Room#: [id number]-1246/4126-01  Discharging Unit Phone Number: [phone]    Emergency Contact:   Extended Emergency Contact Information  Primary Emergency Contact: Hero Sellers  Address: [address] [place] [address] [facility]  Home Phone: [phone]  Relation: Spouse  Secondary Emergency Contact: GARCÍA HORN  Home Phone: [phone]  Mobile Phone: [phone]  Relation: Child  Preferred language: English   needed? No    Past Surgical History:  Past Surgical History:   Procedure Laterality Date    EYE SURGERY  1985    L eye - injury    JOINT REPLACEMENT      R TKR - Dr Butler       Immunization History:   Immunization History   Administered Date(s) Administered    COVID-19, PFIZER PURPLE top, DILUTE for use, (age 12 y+), 30mcg/0.3mL 2021, 2021, 10/14/2021    COVID-19, PFIZER, ( formula), (age 12y+), IM, 30mcg/0.3mL 10/10/2023    Influenza Virus Vaccine 10/05/2012, 10/29/2013, 2014    Influenza Whole 10/04/2010, 10/07/2011    Influenza, FLUAD, (age 65 y+), Adjuvanted, 0.5mL 10/14/2021, 10/17/2022, 10/10/2023    Influenza, High Dose (Fluzone 65 yrs and older) 2015, 2016, 2017, 10/22/2018    Influenza, Triv, inactivated, subunit, adjuvanted, IM (Fluad 65 yrs and older) 10/01/2019    Pneumococcal, PCV-13, PREVNAR 13, (age 6w+), IM, 0.5mL 2015    Pneumococcal, PPSV23, PNEUMOVAX 23, (age 2y+), SC/IM, 0.5mL 2018    Td, unspecified formulation 2010    Zoster Live (Zostavax) 2012       Active Problems:  Patient Active Problem List   Diagnosis Code    Hypertension I10    Hyperlipidemia E78.5         I/O last 3 completed shifts:  In: 10 [I.V.:10]  Out: 2525 [Urine:2525]    Safety Concerns:     Sundowners [place]rome, History of Falls (last 30 days), and At Risk for Falls    Impairments/Disabilities:      Dementia    Nutrition Therapy:  Current Nutrition Therapy:   - Oral Diet:  General and Carb Control 5 carbs/meal (2000kcals/day)    Routes of Feeding: Oral  Liquids: Thin Liquids  Daily Fluid Restriction: no  Last Modified Barium Swallow with Video (Video Swallowing Test): not done    Treatments at the Time of Hospital Discharge:   Respiratory Treatments: ***  Oxygen Therapy:  is not on home oxygen therapy.  Ventilator:    - No ventilator support    Rehab Therapies: Physical Therapy and Occupational Therapy  Weight Bearing Status/Restrictions: No weight bearing restrictions  Other Medical Equipment (for information only, NOT a DME order):    Other Treatments: ***    Patient's personal belongings (please select all that are sent with patient):  None    RN SIGNATURE:  Electronically signed by ADELA NEWMAN RN on 8/2/24 at 11:54 AM EDT    CASE MANAGEMENT/SOCIAL WORK SECTION    Inpatient Status Date: 7/27/24    Readmission Risk Assessment Score:  Readmission Risk              Risk of Unplanned Readmission:  12           Discharging to Facility/ Agency   Name: Discharging to Facility/ Agency   Name: [facility]  Address:  [address] [place] [address]   Phone:  [phone]  Fax:  [phone]     / signature: Electronically signed by Nafisa Bustos RN on 8/1/24 at 3:15 PM EDT    PHYSICIAN SECTION    Prognosis: Fair    Condition at Discharge: Stable    Rehab Potential (if transferring to Rehab): Fair    Recommended Labs or Other Treatments After Discharge: Follow-up PCP in 3 to 5 days or sooner if needed    Physician Certification: I certify the above information and transfer of Audelia Sellers  is necessary for the continuing treatment of the

## 2024-08-01 NOTE — CARE COORDINATION
[employer] authorization received via portal:      Payor approval ID:  [id number]     [employer] Approval ID:       SNF Location:  [facility]    Dates Approved:  08/01 THRU 08/05/2024      NRD:  08/05/2024    Electronically signed by Anastasiya Bowen, Case Management Assistant Anastasiya Bowen on 8/1/2024 at 2:38 PM

## 2024-08-01 NOTE — PROGRESS NOTES
Occupational Therapy  Facility/Department: [address] MED SURG  Occupational Therapy Daily Treatment    Name: Audelia Sellers  : 1940  MRN: [id number]  Date of Service: 2024    Discharge Recommendations:  3-5 sessions per week, Patient would benefit from continued therapy after discharge  OT Equipment Recommendations  Other: TBd by d/c site  Audelia Sellers scored a 15/ on the AM-PAC ADL Inpatient form. Current research shows that an AM-PAC score of 17 or less is typically not associated with a discharge to the patient's home setting. Based on the patient's AM-PAC score and their current ADL deficits, it is recommended that the patient have 3-5 sessions per week of Occupational Therapy at d/c to increase the patient's independence.  Please see assessment section for further patient specific details.    If patient discharges prior to next session this note will serve as a discharge summary.  Please see below for the latest assessment towards goals.       Patient Diagnosis(es): The primary encounter diagnosis was Generalized weakness. Diagnoses of Elevated troponin, Elevated brain natriuretic peptide (BNP) level, Hypomagnesemia, and Altered mental status, unspecified altered mental status type were also pertinent to this visit.  Past Medical History:  has a past medical history of Arthritis, Cataract, Chronic diastolic heart failure (HCC), High blood pressure, Hyperlipidemia, and stress test.  Past Surgical History:  has a past surgical history that includes joint replacement and eye surgery ().    Treatment Diagnosis: decreased cognition/ADLs/fxl mobility      Assessment   Performance deficits / Impairments: Decreased functional mobility ;Decreased endurance;Decreased safe awareness;Decreased ADL status;Decreased balance;Decreased cognition;Decreased strength  Assessment: Per H&P note on 24, \"Audelia Sellers is a 84 y.o. female who presented to the hospital from home because of generalized weakness fatigue

## 2024-08-01 NOTE — PLAN OF CARE
Problem: Discharge Planning  Goal: Discharge to home or other facility with appropriate resources  8/1/2024 1111 by Claudine Pelayo RN  Outcome: Progressing     Problem: Safety - Adult  Goal: Free from fall injury  8/1/2024 1111 by Claudine Pelayo RN  Outcome: Progressing     Problem: Confusion  Goal: Confusion, delirium, dementia, or psychosis is improved or at baseline  Description: INTERVENTIONS:  1. Assess for possible contributors to thought disturbance, including medications, impaired vision or hearing, underlying metabolic abnormalities, dehydration, psychiatric diagnoses, and notify attending LIP  2. [place] high risk fall precautions, as indicated  3. Provide frequent short contacts to provide reality reorientation, refocusing and direction  4. Decrease environmental stimuli, including noise as appropriate  5. Monitor and intervene to maintain adequate nutrition, hydration, elimination, sleep and activity  6. If unable to ensure safety without constant attention obtain sitter and review sitter guidelines with assigned personnel  7. Initiate Psychosocial CNS and Spiritual Care consult, as indicated  8/1/2024 1111 by Claudine Pelayo RN  Outcome: Progressing     Problem: Neurosensory - Adult  Goal: Achieves stable or improved neurological status  8/1/2024 1111 by Claudine Pelayo RN  Outcome: Progressing     Problem: Neurosensory - Adult  Goal: Achieves maximal functionality and self care  8/1/2024 1111 by Claudine Pelayo RN  Outcome: Progressing     Problem: Skin/Tissue Integrity - Adult  Goal: Skin integrity remains intact  8/1/2024 1111 by Claudine Pelayo RN  Outcome: Progressing     Problem: Skin/Tissue Integrity - Adult  Goal: Oral mucous membranes remain intact  8/1/2024 1111 by Claudine Pelayo RN  Outcome: Progressing     Problem: Musculoskeletal - Adult  Goal: Return mobility to safest level of function  8/1/2024 1111 by Claudine Pelayo RN  Outcome: Progressing     Problem: Musculoskeletal -  Adult  Goal: Maintain proper alignment of affected body part  Outcome: Progressing     Problem: Musculoskeletal - Adult  Goal: Return ADL status to a safe level of function  Outcome: Progressing     Problem: Genitourinary - Adult  Goal: Absence of urinary retention  8/1/2024 1111 by Claudine Pelayo RN  Outcome: Progressing     Problem: Skin/Tissue Integrity  Goal: Absence of new skin breakdown  Description: 1.  Monitor for areas of redness and/or skin breakdown  2.  Assess vascular access sites hourly  3.  Every 4-6 hours minimum:  Change oxygen saturation probe site  4.  Every 4-6 hours:  If on nasal continuous positive airway pressure, respiratory therapy assess nares and determine need for appliance change or resting period.  8/1/2024 1111 by Claudine Pelayo RN  Outcome: Progressing     Problem: Nutrition Deficit:  Goal: Optimize nutritional status  8/1/2024 1111 by Claudine Pelayo RN  Outcome: Progressing     Problem: ABCDS Injury Assessment  Goal: Absence of physical injury  8/1/2024 1111 by Claudine Pelayo, RN  Outcome: Progressing

## 2024-08-01 NOTE — CARE COORDINATION
8/1 Met with patient and  at bedside regarding difficulty placing patient at chosen facilities.  directed CM to send referrals to additional facilities on list that are as close to his home as possible. Referrals sent to [facility], [facility], and [facility]. Await responses. Electronically signed by Nafisa Bustos RN on 8/1/2024 at 12:22 PM

## 2024-08-02 VITALS
HEART RATE: 84 BPM | TEMPERATURE: 98.6 F | RESPIRATION RATE: 16 BRPM | WEIGHT: 157.63 LBS | DIASTOLIC BLOOD PRESSURE: 69 MMHG | OXYGEN SATURATION: 95 % | BODY MASS INDEX: 26.26 KG/M2 | SYSTOLIC BLOOD PRESSURE: 158 MMHG | HEIGHT: 65 IN

## 2024-08-02 LAB
BACTERIA UR CULT: ABNORMAL
ORGANISM: ABNORMAL

## 2024-08-02 PROCEDURE — 97535 SELF CARE MNGMENT TRAINING: CPT

## 2024-08-02 PROCEDURE — 6370000000 HC RX 637 (ALT 250 FOR IP): Performed by: STUDENT IN AN ORGANIZED HEALTH CARE EDUCATION/TRAINING PROGRAM

## 2024-08-02 PROCEDURE — 97530 THERAPEUTIC ACTIVITIES: CPT

## 2024-08-02 PROCEDURE — 94760 N-INVAS EAR/PLS OXIMETRY 1: CPT

## 2024-08-02 PROCEDURE — 2580000003 HC RX 258: Performed by: STUDENT IN AN ORGANIZED HEALTH CARE EDUCATION/TRAINING PROGRAM

## 2024-08-02 PROCEDURE — 51798 US URINE CAPACITY MEASURE: CPT

## 2024-08-02 PROCEDURE — 97116 GAIT TRAINING THERAPY: CPT

## 2024-08-02 PROCEDURE — 6360000002 HC RX W HCPCS: Performed by: STUDENT IN AN ORGANIZED HEALTH CARE EDUCATION/TRAINING PROGRAM

## 2024-08-02 PROCEDURE — 6360000002 HC RX W HCPCS: Performed by: NURSE PRACTITIONER

## 2024-08-02 RX ADMIN — LOSARTAN POTASSIUM 100 MG: 100 TABLET, FILM COATED ORAL at 09:29

## 2024-08-02 RX ADMIN — CARBIDOPA AND LEVODOPA 3 TABLET: 25; 100 TABLET ORAL at 09:28

## 2024-08-02 RX ADMIN — BUSPIRONE HYDROCHLORIDE 5 MG: 5 TABLET ORAL at 09:29

## 2024-08-02 RX ADMIN — ACETAMINOPHEN 325MG 650 MG: 325 TABLET ORAL at 04:42

## 2024-08-02 RX ADMIN — HYDRALAZINE HYDROCHLORIDE 10 MG: 20 INJECTION INTRAMUSCULAR; INTRAVENOUS at 06:03

## 2024-08-02 RX ADMIN — ASPIRIN 81 MG 81 MG: 81 TABLET ORAL at 09:29

## 2024-08-02 RX ADMIN — ENOXAPARIN SODIUM 40 MG: 100 INJECTION SUBCUTANEOUS at 09:30

## 2024-08-02 RX ADMIN — NIFEDIPINE 30 MG: 30 TABLET, FILM COATED, EXTENDED RELEASE ORAL at 09:31

## 2024-08-02 ASSESSMENT — PAIN DESCRIPTION - LOCATION: LOCATION: GENERALIZED

## 2024-08-02 ASSESSMENT — PAIN SCALES - GENERAL
PAINLEVEL_OUTOF10: 2
PAINLEVEL_OUTOF10: 0

## 2024-08-02 NOTE — PLAN OF CARE
Problem: Discharge Planning  Goal: Discharge to home or other facility with appropriate resources  8/1/2024 2305 by Renate Eisenberg RN  Outcome: Progressing  Flowsheets (Taken 8/1/2024 2000)  Discharge to home or other facility with appropriate resources: Identify barriers to discharge with patient and caregiver  8/1/2024 1111 by Claudine Pelayo RN  Outcome: Progressing     Problem: Safety - Adult  Goal: Free from fall injury  8/1/2024 2305 by Renate Eisenberg RN  Outcome: Progressing  8/1/2024 1111 by Claudine Pelayo RN  Outcome: Progressing     Problem: Confusion  Goal: Confusion, delirium, dementia, or psychosis is improved or at baseline  Description: INTERVENTIONS:  1. Assess for possible contributors to thought disturbance, including medications, impaired vision or hearing, underlying metabolic abnormalities, dehydration, psychiatric diagnoses, and notify attending LIP  2. [place] high risk fall precautions, as indicated  3. Provide frequent short contacts to provide reality reorientation, refocusing and direction  4. Decrease environmental stimuli, including noise as appropriate  5. Monitor and intervene to maintain adequate nutrition, hydration, elimination, sleep and activity  6. If unable to ensure safety without constant attention obtain sitter and review sitter guidelines with assigned personnel  7. Initiate Psychosocial CNS and Spiritual Care consult, as indicated  8/1/2024 2305 by Renate Eisenberg RN  Outcome: Progressing  8/1/2024 1111 by Claudine Pelayo RN  Outcome: Progressing     Problem: Neurosensory - Adult  Goal: Achieves stable or improved neurological status  8/1/2024 2305 by Renate Eisenberg RN  Outcome: Progressing  Flowsheets (Taken 8/1/2024 2000)  Achieves stable or improved neurological status: Assess for and report changes in neurological status  8/1/2024 1111 by Claudine Pelayo RN  Outcome: Progressing  Goal: Achieves maximal functionality and self care  8/1/2024 2305 by Renate Eisenberg  RN  Outcome: Progressing  Flowsheets (Taken 8/1/2024 2000)  Achieves maximal functionality and self care: Monitor swallowing and airway patency with patient fatigue and changes in neurological status  8/1/2024 1111 by Claudine Pelayo RN  Outcome: Progressing     Problem: Skin/Tissue Integrity - Adult  Goal: Skin integrity remains intact  8/1/2024 2305 by Renate Eisenberg RN  Outcome: Progressing  Flowsheets (Taken 8/1/2024 2000)  Skin Integrity Remains Intact: Monitor for areas of redness and/or skin breakdown  8/1/2024 1111 by Claudine Pelayo RN  Outcome: Progressing  Goal: Oral mucous membranes remain intact  8/1/2024 2305 by Renate Eisenberg RN  Outcome: Progressing  8/1/2024 1111 by Claudine Pelayo RN  Outcome: Progressing     Problem: Musculoskeletal - Adult  Goal: Return mobility to safest level of function  8/1/2024 2305 by Renate Eisenberg RN  Outcome: Progressing  Flowsheets (Taken 8/1/2024 2000)  Return Mobility to Safest Level of Function: Assess patient stability and activity tolerance for standing, transferring and ambulating with or without assistive devices  8/1/2024 1111 by Claudine Pelayo RN  Outcome: Progressing  Goal: Maintain proper alignment of affected body part  8/1/2024 2305 by Renate Eisenberg RN  Outcome: Progressing  8/1/2024 1111 by Claudine Pelayo RN  Outcome: Progressing  Goal: Return ADL status to a safe level of function  8/1/2024 2305 by Renate Eisenberg RN  Outcome: Progressing  8/1/2024 1111 by Claudine Pelayo RN  Outcome: Progressing     Problem: Genitourinary - Adult  Goal: Absence of urinary retention  8/1/2024 2305 by Renate Eisenberg RN  Outcome: Progressing  Flowsheets (Taken 8/1/2024 2000)  Absence of urinary retention: Assess patient’s ability to void and empty bladder  8/1/2024 1111 by Claudine Pelayo RN  Outcome: Progressing     Problem: Skin/Tissue Integrity  Goal: Absence of new skin breakdown  Description: 1.  Monitor for areas of redness and/or skin breakdown  2.  Assess

## 2024-08-02 NOTE — DISCHARGE SUMMARY
Hospital Medicine Discharge Summary    Patient ID: Audelia Sellers      Patient's PCP: Kaci Montero MD    Admit Date: 7/27/2024     Discharge Date: 8/2/2024      Admitting Provider: Nora Nieves MD     Discharge Provider: Asa Carrion MD     Discharge Diagnoses:       Active Hospital Problems    Diagnosis     Moderate malnutrition (HCC) [E44.0]     Generalized weakness [R53.1]        The patient was seen and examined on day of discharge and this discharge summary is in conjunction with any daily progress note from day of discharge.    Hospital Course:   Audelia Sellers is a 84 y.o. female with pmh of hypertension, advanced dementia, mood disorder, hyperlipidemia and Parkinson's disease who presents with Generalized weakness patient was found to have bacteriuria was treated with Rocephin until culture had no growth.  Patient was also thought to have acute cholecystitis was treated with Flagyl IV until her ultrasound showed no acute finding that suggest acute cholecystitis so Flagyl was stopped.  Patient continued to feel weak and debilitated.  Patient's  was unable to care for her at home.  Patient will be discharged to a long-term facility.  Patient is to follow-up with PCP in 3 to 5 days or sooner if needed.  Patient is to return to the ED if symptoms return          Physical Exam Performed:     BP (!) 158/69   Pulse 84   Temp 98.6 °F (37 °C) (Oral)   Resp 16   Ht 1.651 m (5' 5\")   Wt 71.5 kg (157 lb 10.1 oz)   SpO2 95%   BMI 26.23 kg/m²       General appearance: No apparent distress, appears stated age and cooperative.  HEENT: Pupils equal, round, and reactive to light.   Neck: Supple, with full range of motion. Trachea midline.  Respiratory:  Normal respiratory effort. Clear to auscultation  Cardiovascular: Regular rate and rhythm with normal S1/S2   Abdomen: Soft, non-tender, non-distended   Musculoskeletal: No edema bilaterally.  Full range of motion without deformity.  Skin: Skin color,  EVERY DAY, Disp-30 tablet, R-0NEED TO ESTABLISH CARE WITH NEW PROVIDERNormal      NIFEdipine (ADALAT CC) 30 MG extended release tablet TAKE 1 TABLET BY MOUTH EVERY DAY, Disp-30 tablet, R-0NEED TO ESTABLISH CARE WITH NEW PROVIDERNormal      busPIRone (BUSPAR) 5 MG tablet TAKE 1 TABLET BY MOUTH TWICE A DAY, Disp-60 tablet, R-0NEED TO ESTABLISH CARE WITH NEW PROVIDERNormal      mirtazapine (REMERON) 15 MG tablet TAKE 1 TABLET BY MOUTH EVERY DAY AT NIGHT, Disp-90 tablet, R-1Normal      carvedilol (COREG) 25 MG tablet TAKE 1 TABLET BY MOUTH TWICE A DAY, Disp-180 tablet, R-1Normal      ciprofloxacin (CIPRO) 500 MG tablet TAKE 1 TABLET TWICE DAILY AS NEEDED FOR UTI, Disp-10 tablet, R-2Normal      acetaminophen (TYLENOL) 325 MG tablet Take 2 tablets by mouth every 6 hours as needed for PainHistorical Med      aspirin 81 MG tablet Take 1 tablet by mouth dailyHistorical Med             Time Spent on discharge: 65 in the examination, evaluation, counseling and review of medications and discharge plan.      Signed:    Asa Carrion MD   8/2/2024      Thank you Kaci Montero MD for the opportunity to be involved in this patient's care. If you have any questions or concerns, please feel free to contact me at [phone].    Comment: Please note this report has been produced using speech recognition software and may contain errors related to that system including errors in grammar, punctuation, and spelling, as well as words and phrases that may be inappropriate. If there are any questions or concerns, please feel free to contact the dictating provider for clarification.

## 2024-08-02 NOTE — PROGRESS NOTES
PALLIATIVE MEDICINE PROGRESS NOTE     Patient name:Audelia Sellers    MRN:[id number] :1940  Room/Bed:L0S-3977/4126-01    LOS: 6 days        ASSESSMENT/RECOMMENDATIONS     84 y.o. female with altered mental status and weakness secondary to underlying acute and chronic conditions.     Symptom management     Altered mental status - Secondary to UTI. Overall, patient continues to improve mentally and physically.   UTI - Recurrent in nature. IV antibiotics per attending.   Debility - Progressive secondary to underlying co morbidities. PT/OT active, plans for SNF. Continues to improve as underlying infection resolved.   Goals of Care - See below.      Patient/Family Goals of Care :    24 - I explained the highly personal nature of deciding how to approach serious illness, and outlined two extremes--continuing disease-focused, morbidity-inducing treatment with the possibility of prolonging life vs. focusing on comfort/quality of life while allowing disease progression and natural death. Emphasis was placed on the absence of a \"right\" answer, as opposed to making good decisions based on personal preferences and circumstances, with ongoing reevaluation and adjustment in treatment goals as the clinical course unfolds. Patients  feels she has been suffering over the last several months, but really started 2-3 years ago. He would not wish to prolong her life given her decline. Has questions about hospice care in the home, discussed at length. Given the patients physical decline, Mr. Sellers is unable to care for her physically. He is hoping she can return home after therapy, however is open to placement if needed. We discussed a referral to hospice to follow post skilled stay. Patient utilized [facility] with his previous wife and was very happy with their care. Referral to be sent for informational purposes only. Code status updated to DNR/CC per family request.     24 - DNR/CC form given to

## 2024-08-02 NOTE — CARE COORDINATION
DISCHARGE SUMMARY     DATE OF DISCHARGE: 8/2/24    DISCHARGE DESTINATION: SNF    FACILITY  Discharging to Facility/ Agency   [facility] [place]  Address:  [address] [place], [place] [id number]   Phone:  [phone]  Fax:  [phone]     Level of Care: Skilled    Report Number: [phone]    Fax Number: [phone]     Precert Obtained: Yes    Hens Completed: yes    PASARR: N/A    Notified: RN, Family, and Facility/Agency   at bedside.    Prescriptions Faxed:no    HEMODIALYSIS: No    TRANSPORTATION: Stretcher    Company Name: [facility]     Time: 1:30 PM    Phone Number: [phone]    NEW DME ORDERED: no    Electronically signed by Nafisa Bustos RN on 8/2/2024 at 11:50 AM

## 2024-08-02 NOTE — PROGRESS NOTES
Report given to receiving nurse, all questions answered. Call back number provided. Electronically signed by ADELA NEWMAN RN on 8/2/2024 at 1:51 PM
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
## 2024-08-02 NOTE — PROGRESS NOTES
Comprehensive Nutrition Assessment    Type and Reason for Visit:  Reassess    Nutrition Recommendations/Plan:   Continue Regular 5 CCC diet. - Please monitor and document intake  Recommend MVI once per day  Recommend iron study and supplementation if needed  Discontinue ONS - refusing     Malnutrition Assessment:  Malnutrition Status:  Moderate malnutrition (08/02/24 1110)    Context:  Acute Illness     Findings of the 6 clinical characteristics of malnutrition:  Energy Intake:  50% or less of estimated energy requirements for 5 or more days  Weight Loss:  Unable to assess     Body Fat Loss:  Mild body fat loss Buccal region   Muscle Mass Loss:  Mild muscle mass loss Temples (temporalis)  Fluid Accumulation:  Mild Extremities   Strength:  Not Performed    Nutrition Assessment:    Pt with dementia, family at bedside helped answer questions for assessment. No meals are documented, however family states that she has not eaten any of the [employer], will d/c order. Also states she does best with soup ordered at lunch and dinner or grilled cheese. Encouraged to continue to order foods that pt tolerates and accepts.    Nutrition Related Findings:    No new labs. LBM 7/29/ BLE nonpitting edema. Wound Type: None       Current Nutrition Intake & Therapies:    Average Meal Intake: 1-25%, 26-50%  Average Supplements Intake: 0%  ADULT DIET; Regular; 5 carb choices (75 gm/meal)    Anthropometric Measures:  Height: 165.1 cm (5' 5\")  Ideal Body Weight (IBW): 125 lbs (57 kg)    Admission Body Weight: 72.1 kg (159 lb)  Current Body Weight: 71.5 kg (157 lb 10.1 oz),   IBW. Weight Source: Bed Scale  Current BMI (kg/m2): 26.2                          BMI Categories: Overweight (BMI 25.0-29.9)    Estimated Daily Nutrient Needs:        Energy (kcal/day): 8374-4009 (20-25 x ABW 71 kg)     Protein (g/day): 71-99 (1-1.4 x ABW 71 kg)  Method Used for Fluid Requirements: 1 ml/kcal  Fluid (ml/day):      Nutrition Diagnosis:   Moderate

## 2024-08-02 NOTE — PROGRESS NOTES
assistance;Minimal assistance  Functional Mobility Skilled Clinical Factors: RW. EOB>recliner short distance + recliner>bedside chair>recliner (10+10 feet) with min unsteadiness, no LOB. cues for RW management, min unsteadiness with turns     Activity Tolerance  Activity Tolerance: Patient limited by fatigue;Patient limited by endurance;Treatment limited secondary to decreased cognition  Bed mobility  Supine to Sit: Moderate assistance  Sit to Supine: Unable to assess  Scooting: Contact guard assistance  Bed Mobility Comments: increased time w/ cues, used hand rail, assisted w/ trunk and legs  Transfers  Sit to stand: Minimal assistance;Contact guard assistance  Stand to sit: Contact guard assistance;Minimal assistance  Transfer Comments: RW. cues for hand placement. EOB/reclinerx2/bedside chair  Vision  Vision: Impaired  Vision Exceptions: Wears glasses at all times  Hearing  Hearing: Exceptions to WFL  Hearing Exceptions: Hard of hearing/hearing concerns  Cognition  Overall Cognitive Status: Exceptions  Following Commands: Follows one step commands with repetition;Follows one step commands with increased time  Attention Span: Attends with cues to redirect  Memory: Decreased recall of precautions;Decreased recall of recent events;Decreased short term memory  Safety Judgement: Decreased awareness of need for assistance;Decreased awareness of need for safety  Problem Solving: Decreased awareness of errors  Insights: Not aware of deficits  Initiation: Requires cues for all  Sequencing: Requires cues for all  Cognition Comment: appears 8/2 to be having visual hallucinations of people and \"green things\"  Orientation  Overall Orientation Status: Impaired  Orientation Level: Disoriented to time;Oriented to person;Disoriented to situation               Static Sitting Balance Exercises: CGA at EOB in prep for tx, pt at times attempts to lay back down requiring Min A, nausea and gagging at times  Static Standing Balance  Exercises: Min/CGA PRN tx  Education Given To: Patient  Education Provided: Role of Therapy;Plan of Care;Transfer Training;Orientation;Fall Prevention Strategies;Mobility Training;Family Education;ADL Adaptive Strategies;Energy Conservation  Education Method: Verbal;Demonstration  Barriers to Learning: Cognition;Hearing  Education Outcome: Continued education needed                      AM-PAC - ADL  AM-PAC Daily Activity - Inpatient   How much help is needed for putting on and taking off regular lower body clothing?: A Lot  How much help is needed for bathing (which includes washing, rinsing, drying)?: A Lot  How much help is needed for toileting (which includes using toilet, bedpan, or urinal)?: Total  How much help is needed for putting on and taking off regular upper body clothing?: A Little  How much help is needed for taking care of personal grooming?: A Little  How much help for eating meals?: None  AM-PAC Inpatient Daily Activity Raw Score: 15  AM-PAC Inpatient ADL T-Scale Score : 34.69  ADL Inpatient CMS 0-100% Score: 56.46  ADL Inpatient CMS G-Code Modifier : CK    Goals  Short Term Goals  Time Frame for Short Term Goals: prior to d/c: goals ongoing 8/2  Short Term Goal 1: Megan fxl tx and fxl mobility to LRAD in prep to complete ADL routine, MET 8/2- progress to SBA  Short Term Goal 2: Megan toileting  Short Term Goal 3: Megan LB dressing/bathing  Short Term Goal 4: Megan standing x2-3 minutes to complete ADL task to improve endurance/balance  Short Term Goal 5: Pt will complete BUE exercises in all planes x10 reps each in prep to complete ADL task with independence  Long Term Goals  Time Frame for Long Term Goals : STGs=LTGs  Patient Goals   Patient goals : pt did not state       Therapy Time   Individual Concurrent Group Co-treatment   Time In 1100         Time Out 1130         Minutes 30         Timed Code Treatment Minutes: 27 Minutes (15 ADL 12 TA)       ANNIE Cedeno, OTR/L

## 2024-08-02 NOTE — PROGRESS NOTES
Pt taken to [facility] via [facility], voided 100ml,then bladder scanned, had 440ml on scan, straight cath for 630ml dark yellow urine.

## 2024-08-02 NOTE — PROGRESS NOTES
Report given to EMS. All questions answered. IV removed without complications. Pt discharged on stretcher to skilled facility. All pts items given to EMS. Electronically signed by ADELA NEWMAN RN on 8/2/2024 at 1:53 PM

## 2024-08-02 NOTE — PROGRESS NOTES
Physical Therapy  Facility/Department: [address] MED SURG  Physical Therapy Daily Note  ([facility])  If patient discharges prior to next session this note will serve as a discharge summary.  Please see below for the latest assessment towards goals.     Name: Audelia Sellers  : 1940  MRN: [id number]  Date of Service: 2024    Discharge Recommendations:  Patient would benefit from continued therapy after discharge (3-5)   Audelia Sellers scored a 14/24 on the AM-PAC short mobility form. Current research shows that an AM-PAC score of 17 or less is typically not associated with a discharge to the patient's home setting. Based on the patient's AM-PAC score and their current functional mobility deficits, it is recommended that the patient have 3-5 sessions per week of Physical Therapy at d/c to increase the patient's independence.  Please see assessment section for further patient specific details.  PT Equipment Recommendations  Equipment Needed: No  Other: defer to next level of care      Patient Diagnosis(es): The primary encounter diagnosis was Generalized weakness. Diagnoses of Elevated troponin, Elevated brain natriuretic peptide (BNP) level, Hypomagnesemia, and Altered mental status, unspecified altered mental status type were also pertinent to this visit.  Past Medical History:  has a past medical history of Arthritis, Cataract, Chronic diastolic heart failure (HCC), High blood pressure, Hyperlipidemia, and stress test.  Past Surgical History:  has a past surgical history that includes joint replacement and eye surgery ().    Assessment   Assessment: Today, the pt con't to demonstrate that she is functioning well below her baseline and below a level where her  is safe to provide her care. The pt is requiring mod A for bed mobility, min A for sit <> stand transfers and CGA/min A for ambulation with a RW. The pt did increase her walking distance to 10 feet x 2 with seated rest breaks. The pt con't to be

## 2024-08-02 NOTE — PLAN OF CARE
breakdown  Description: 1.  Monitor for areas of redness and/or skin breakdown  2.  Assess vascular access sites hourly  3.  Every 4-6 hours minimum:  Change oxygen saturation probe site  4.  Every 4-6 hours:  If on nasal continuous positive airway pressure, respiratory therapy assess nares and determine need for appliance change or resting period.  8/2/2024 1236 by Maria De Jesus Penaloza RN  Outcome: Progressing  8/1/2024 2305 by Renate Eisenberg, RN  Outcome: Progressing     Problem: Nutrition Deficit:  Goal: Optimize nutritional status  8/2/2024 1236 by Maria De Jesus Penaloza RN  Outcome: Progressing  8/1/2024 2305 by Renate Eisenberg, RN  Outcome: Progressing     Problem: ABCDS Injury Assessment  Goal: Absence of physical injury  8/2/2024 1236 by Maria De Jesus Penaloza RN  Outcome: Progressing  8/1/2024 2305 by Renate Eisenberg, RN  Outcome: Progressing

## 2024-08-02 NOTE — CARE COORDINATION
8/2 Plan: Precert approved for [place] 8/1-8/5. HENs started. Electronically signed by Nafisa Bustos RN on 8/2/2024 at 8:47 AM

## 2024-08-04 NOTE — PROGRESS NOTES
Physician Progress Note      PATIENT:               ELIOT SKINNER  CSN #:                  [id number]  :                       1940  ADMIT DATE:       2024 1:57 PM  DISCH DATE:        2024 1:54 PM  RESPONDING  PROVIDER #:        Asa Carrion MD          QUERY TEXT:    Patient admitted with \"generalized weakness and functional decline due to   Parkinson's and advanced dementia\" per  query response.  \"Weakness was   found to have UTI\" documented in  progress note.  UA on admission showed no   bacteria, negative nitrite, trace leuko esterase.  No culture indicated per   lab protocol.  Query response on \"UTI has been ruled out after study.\"   Second UA sent  positive for yeast, no bacteria seen, positive nitrite,   moderate leuko esterase and 52 WBC's.  Culture sent  pending.  If   possible, please document in progress notes and discharge    The medical record reflects the following:  Risk Factors: 83 yo female, generalized weakness, hx UTI with chronic   suppressive Ciprofloxacin  Clinical Indicators: Second UA sent  positive for yeast, no bacteria seen,   positive nitrite, moderate leuko esterase and 52 WBC's.  Treatment: repeat UA, urine culture, Rocephin discontinued   Options provided:  -- UTI present on admission as evidenced by, Please document supporting   clinical indicators  -- UTI developed following admission as evidenced by, Please document   supporting clinical indicators  -- UTI ruled out  -- Other - I will add my own diagnosis  -- Disagree - Not applicable / Not valid  -- Disagree - Clinically unable to determine / Unknown  -- Refer to Clinical Documentation Reviewer    PROVIDER RESPONSE TEXT:    UTI ruled out after study    Query created by: She Cruz on 2024 4:39 AM      Electronically signed by:  Asa Carrion MD 2024 3:42 PM

## 2024-08-05 RX ORDER — BUSPIRONE HYDROCHLORIDE 5 MG/1
TABLET ORAL
Qty: 180 TABLET | Refills: 1 | Status: SHIPPED | OUTPATIENT
Start: 2024-08-05

## 2024-08-05 RX ORDER — NIFEDIPINE 30 MG
TABLET, EXTENDED RELEASE ORAL
Qty: 90 TABLET | Refills: 1 | Status: SHIPPED | OUTPATIENT
Start: 2024-08-05

## 2024-08-05 RX ORDER — LOSARTAN POTASSIUM 100 MG/1
100 TABLET ORAL DAILY
Qty: 90 TABLET | Refills: 1 | Status: SHIPPED | OUTPATIENT
Start: 2024-08-05

## 2024-08-07 ENCOUNTER — TELEPHONE (OUTPATIENT)
Dept: ORTHOPEDIC SURGERY | Age: 84
End: 2024-08-07

## 2024-08-07 NOTE — TELEPHONE ENCOUNTER
Called and informed as long as she has not had a new injury, she will not need to be on a stretcher.  Plan is for knee injection, wheelchair is fine.

## 2024-08-07 NOTE — TELEPHONE ENCOUNTER
Other SANIYA WITH [facility] WOULD LIKE A CALL BACK WANTS TO KNOW IF PATIENT SHOULD COME BY STRETCHER IS SHE NEEDS TO BE TRANFERRED. [phone]

## 2024-08-13 ENCOUNTER — TELEPHONE (OUTPATIENT)
Dept: ORTHOPEDIC SURGERY | Age: 84
End: 2024-08-13

## 2024-08-13 ENCOUNTER — OFFICE VISIT (OUTPATIENT)
Dept: ORTHOPEDIC SURGERY | Age: 84
End: 2024-08-13
Payer: MEDICARE

## 2024-08-13 VITALS — HEIGHT: 65 IN | BODY MASS INDEX: 26.23 KG/M2

## 2024-08-13 DIAGNOSIS — M17.12 PRIMARY OSTEOARTHRITIS OF LEFT KNEE: Primary | ICD-10-CM

## 2024-08-13 PROCEDURE — 20610 DRAIN/INJ JOINT/BURSA W/O US: CPT | Performed by: PHYSICIAN ASSISTANT

## 2024-08-13 RX ORDER — TRIAMCINOLONE ACETONIDE 40 MG/ML
40 INJECTION, SUSPENSION INTRA-ARTICULAR; INTRAMUSCULAR ONCE
Status: COMPLETED | OUTPATIENT
Start: 2024-08-13 | End: 2024-08-13

## 2024-08-13 RX ORDER — BUPIVACAINE HYDROCHLORIDE 2.5 MG/ML
2 INJECTION, SOLUTION INFILTRATION; PERINEURAL ONCE
Status: COMPLETED | OUTPATIENT
Start: 2024-08-13 | End: 2024-08-13

## 2024-08-13 RX ADMIN — TRIAMCINOLONE ACETONIDE 40 MG: 40 INJECTION, SUSPENSION INTRA-ARTICULAR; INTRAMUSCULAR at 10:22

## 2024-08-13 RX ADMIN — BUPIVACAINE HYDROCHLORIDE 5 MG: 2.5 INJECTION, SOLUTION INFILTRATION; PERINEURAL at 10:21

## 2024-08-13 NOTE — TELEPHONE ENCOUNTER
General Question     Subject: OFFICE VISIT NOTE  Patient and /or Facility Request: Audelia Sellers   Contact Number: [phone]    WANG WITH [facility] CALLED WANTED TO GET THE PATIENT OFFICE VISIT NOTES FROM HER APPT TODAY 08/13/24    FAX# [phone] -ATTENTION TO WANG WITH MEDICAL RECORDS     PLEASE CALL BACK THE ABOVE NUMBER

## 2024-08-13 NOTE — PROGRESS NOTES
Subjective:      Patient ID: Audelia Sellers is a 84 y.o.  female who is here for evaluation and treatment of left knee pain related to arthritis.  She is in a stretcher today.  She is currently residing in a ECF which she has been there for 1 to 2 weeks.  The most recent  injection performed 5/13/24 helped relieve the knee symptoms.    Pain has gradually returned.     Review of Systems:   Denies fever or chills.  Denies numbness or tingling around the knee.    Past Medical History:   Diagnosis Date    Arthritis     Cataract     Chronic diastolic heart failure (HCC)     High blood pressure     Hyperlipidemia     stress test 01/01/2007    normal       Family History   Problem Relation Age of Onset    Cancer Other     High Blood Pressure Other        Past Surgical History:   Procedure Laterality Date    EYE SURGERY  1985    L eye - injury    JOINT REPLACEMENT      R TKR - Dr Butler       Social History     Occupational History    Not on file   Tobacco Use    Smoking status: Never    Smokeless tobacco: Never   Substance and Sexual Activity    Alcohol use: No     Alcohol/week: 0.0 standard drinks of alcohol    Drug use: No    Sexual activity: Not on file       Current Outpatient Medications   Medication Sig Dispense Refill    busPIRone (BUSPAR) 5 MG tablet TAKE 1 TABLET BY MOUTH TWICE A  tablet 1    NIFEdipine (ADALAT CC) 30 MG extended release tablet TAKE 1 TABLET BY MOUTH EVERY DAY 90 tablet 1    losartan (COZAAR) 100 MG tablet TAKE 1 TABLET BY MOUTH EVERY DAY 90 tablet 1    simvastatin (ZOCOR) 5 MG tablet Take 1 tablet by mouth nightly      carbidopa-levodopa (SINEMET)  MG per tablet Take 3 tablets by mouth 4 times daily      mirtazapine (REMERON) 15 MG tablet TAKE 1 TABLET BY MOUTH EVERY DAY AT NIGHT 90 tablet 1    carvedilol (COREG) 25 MG tablet TAKE 1 TABLET BY MOUTH TWICE A DAY (Patient taking differently: Take 1 tablet by mouth 2 times daily (with meals) TAKE 1 TABLET BY MOUTH TWICE A DAY) 180 tablet 1

## 2024-09-19 ENCOUNTER — HOSPITAL ENCOUNTER (EMERGENCY)
Age: 84
Discharge: HOME | End: 2024-09-19
Attending: STUDENT IN AN ORGANIZED HEALTH CARE EDUCATION/TRAINING PROGRAM
Payer: MEDICARE

## 2024-09-19 ENCOUNTER — APPOINTMENT (OUTPATIENT)
Dept: CT IMAGING | Age: 84
End: 2024-09-19
Payer: MEDICARE

## 2024-09-19 VITALS
TEMPERATURE: 97.3 F | RESPIRATION RATE: 20 BRPM | DIASTOLIC BLOOD PRESSURE: 57 MMHG | OXYGEN SATURATION: 97 % | WEIGHT: 157 LBS | HEART RATE: 74 BPM | BODY MASS INDEX: 26.16 KG/M2 | HEIGHT: 65 IN | SYSTOLIC BLOOD PRESSURE: 167 MMHG

## 2024-09-19 DIAGNOSIS — S01.01XA LACERATION OF SCALP, INITIAL ENCOUNTER: ICD-10-CM

## 2024-09-19 DIAGNOSIS — S09.90XA CLOSED HEAD INJURY, INITIAL ENCOUNTER: Primary | ICD-10-CM

## 2024-09-19 PROCEDURE — 6360000002 HC RX W HCPCS: Performed by: STUDENT IN AN ORGANIZED HEALTH CARE EDUCATION/TRAINING PROGRAM

## 2024-09-19 PROCEDURE — 70450 CT HEAD/BRAIN W/O DYE: CPT

## 2024-09-19 PROCEDURE — 90714 TD VACC NO PRESV 7 YRS+ IM: CPT | Performed by: STUDENT IN AN ORGANIZED HEALTH CARE EDUCATION/TRAINING PROGRAM

## 2024-09-19 PROCEDURE — 12001 RPR S/N/AX/GEN/TRNK 2.5CM/<: CPT

## 2024-09-19 PROCEDURE — 72125 CT NECK SPINE W/O DYE: CPT

## 2024-09-19 PROCEDURE — 99284 EMERGENCY DEPT VISIT MOD MDM: CPT

## 2024-09-19 PROCEDURE — 90471 IMMUNIZATION ADMIN: CPT | Performed by: STUDENT IN AN ORGANIZED HEALTH CARE EDUCATION/TRAINING PROGRAM

## 2024-09-19 PROCEDURE — 6370000000 HC RX 637 (ALT 250 FOR IP): Performed by: STUDENT IN AN ORGANIZED HEALTH CARE EDUCATION/TRAINING PROGRAM

## 2024-09-19 RX ORDER — ACETAMINOPHEN 500 MG
1000 TABLET ORAL
Status: COMPLETED | OUTPATIENT
Start: 2024-09-19 | End: 2024-09-19

## 2024-09-19 RX ADMIN — CLOSTRIDIUM TETANI TOXOID ANTIGEN (FORMALDEHYDE INACTIVATED) AND CORYNEBACTERIUM DIPHTHERIAE TOXOID ANTIGEN (FORMALDEHYDE INACTIVATED) 0.5 ML: 5; 2 INJECTION, SUSPENSION INTRAMUSCULAR at 05:45

## 2024-09-19 RX ADMIN — ACETAMINOPHEN 1000 MG: 500 TABLET ORAL at 05:44

## 2024-09-20 ENCOUNTER — APPOINTMENT (OUTPATIENT)
Dept: CT IMAGING | Age: 84
End: 2024-09-20
Payer: MEDICARE

## 2024-09-20 ENCOUNTER — HOSPITAL ENCOUNTER (EMERGENCY)
Age: 84
Discharge: HOME | End: 2024-09-20
Payer: MEDICARE

## 2024-09-20 VITALS
WEIGHT: 157 LBS | DIASTOLIC BLOOD PRESSURE: 51 MMHG | OXYGEN SATURATION: 94 % | TEMPERATURE: 97.1 F | RESPIRATION RATE: 15 BRPM | SYSTOLIC BLOOD PRESSURE: 111 MMHG | HEART RATE: 76 BPM | BODY MASS INDEX: 25.23 KG/M2 | HEIGHT: 66 IN

## 2024-09-20 DIAGNOSIS — R91.1 PULMONARY NODULE: ICD-10-CM

## 2024-09-20 DIAGNOSIS — R07.9 CHEST PAIN, UNSPECIFIED TYPE: Primary | ICD-10-CM

## 2024-09-20 DIAGNOSIS — N83.201 OVARIAN CYST, RIGHT: ICD-10-CM

## 2024-09-20 LAB
ALBUMIN SERPL-MCNC: 3.1 G/DL (ref 3.4–5)
ALBUMIN/GLOB SERPL: 1.2 {RATIO} (ref 1.1–2.2)
ALP SERPL-CCNC: 74 U/L (ref 40–129)
ALT SERPL-CCNC: 8 U/L (ref 10–40)
ANION GAP SERPL CALCULATED.3IONS-SCNC: 9 MMOL/L (ref 3–16)
AST SERPL-CCNC: 10 U/L (ref 15–37)
BASOPHILS # BLD: 0.1 K/UL (ref 0–0.2)
BASOPHILS NFR BLD: 1.1 %
BILIRUB SERPL-MCNC: 1 MG/DL (ref 0–1)
BUN SERPL-MCNC: 18 MG/DL (ref 7–20)
CALCIUM SERPL-MCNC: 8.5 MG/DL (ref 8.3–10.6)
CHLORIDE SERPL-SCNC: 101 MMOL/L (ref 99–110)
CO2 SERPL-SCNC: 25 MMOL/L (ref 21–32)
CREAT SERPL-MCNC: <0.5 MG/DL (ref 0.6–1.2)
DEPRECATED RDW RBC AUTO: 17 % (ref 12.4–15.4)
EOSINOPHIL # BLD: 0.2 K/UL (ref 0–0.6)
EOSINOPHIL NFR BLD: 5.1 %
GFR SERPLBLD CREATININE-BSD FMLA CKD-EPI: >90 ML/MIN/{1.73_M2}
GLUCOSE SERPL-MCNC: 109 MG/DL (ref 70–99)
HCT VFR BLD AUTO: 32.8 % (ref 36–48)
HGB BLD-MCNC: 11.2 G/DL (ref 12–16)
LIPASE SERPL-CCNC: 108 U/L (ref 13–60)
LYMPHOCYTES # BLD: 1.5 K/UL (ref 1–5.1)
LYMPHOCYTES NFR BLD: 31.7 %
MCH RBC QN AUTO: 31.4 PG (ref 26–34)
MCHC RBC AUTO-ENTMCNC: 34.1 G/DL (ref 31–36)
MCV RBC AUTO: 91.8 FL (ref 80–100)
MONOCYTES # BLD: 0.4 K/UL (ref 0–1.3)
MONOCYTES NFR BLD: 9 %
NEUTROPHILS # BLD: 2.5 K/UL (ref 1.7–7.7)
NEUTROPHILS NFR BLD: 53.1 %
NT-PROBNP SERPL-MCNC: 874 PG/ML (ref 0–449)
PLATELET # BLD AUTO: 213 K/UL (ref 135–450)
PMV BLD AUTO: 8.7 FL (ref 5–10.5)
POTASSIUM SERPL-SCNC: 4.6 MMOL/L (ref 3.5–5.1)
PROT SERPL-MCNC: 5.6 G/DL (ref 6.4–8.2)
RBC # BLD AUTO: 3.58 M/UL (ref 4–5.2)
SODIUM SERPL-SCNC: 135 MMOL/L (ref 136–145)
TROPONIN, HIGH SENSITIVITY: 13 NG/L (ref 0–14)
TROPONIN, HIGH SENSITIVITY: 14 NG/L (ref 0–14)
WBC # BLD AUTO: 4.7 K/UL (ref 4–11)

## 2024-09-20 PROCEDURE — 6360000004 HC RX CONTRAST MEDICATION: Performed by: PHYSICIAN ASSISTANT

## 2024-09-20 PROCEDURE — 84484 ASSAY OF TROPONIN QUANT: CPT

## 2024-09-20 PROCEDURE — 80053 COMPREHEN METABOLIC PANEL: CPT

## 2024-09-20 PROCEDURE — 83690 ASSAY OF LIPASE: CPT

## 2024-09-20 PROCEDURE — 74177 CT ABD & PELVIS W/CONTRAST: CPT

## 2024-09-20 PROCEDURE — 99285 EMERGENCY DEPT VISIT HI MDM: CPT

## 2024-09-20 PROCEDURE — 93005 ELECTROCARDIOGRAM TRACING: CPT | Performed by: PHYSICIAN ASSISTANT

## 2024-09-20 PROCEDURE — 85025 COMPLETE CBC W/AUTO DIFF WBC: CPT

## 2024-09-20 PROCEDURE — 71260 CT THORAX DX C+: CPT

## 2024-09-20 PROCEDURE — 83880 ASSAY OF NATRIURETIC PEPTIDE: CPT

## 2024-09-20 RX ORDER — IOPAMIDOL 755 MG/ML
75 INJECTION, SOLUTION INTRAVASCULAR
Status: COMPLETED | OUTPATIENT
Start: 2024-09-20 | End: 2024-09-20

## 2024-09-20 RX ADMIN — IOPAMIDOL 75 ML: 755 INJECTION, SOLUTION INTRAVENOUS at 17:55

## 2024-09-20 ASSESSMENT — ENCOUNTER SYMPTOMS
ABDOMINAL PAIN: 0
RESPIRATORY NEGATIVE: 1
BACK PAIN: 0
VOMITING: 0
COUGH: 0
CONSTIPATION: 0
DIARRHEA: 0
PHOTOPHOBIA: 0
SHORTNESS OF BREATH: 0
COLOR CHANGE: 0
CHEST TIGHTNESS: 0

## 2024-09-20 ASSESSMENT — PAIN SCALES - GENERAL: PAINLEVEL_OUTOF10: 0

## 2024-09-20 ASSESSMENT — PAIN - FUNCTIONAL ASSESSMENT: PAIN_FUNCTIONAL_ASSESSMENT: 0-10

## 2024-09-20 ASSESSMENT — HEART SCORE: ECG: NORMAL

## 2024-09-21 LAB
EKG ATRIAL RATE: 78 BPM
EKG DIAGNOSIS: NORMAL
EKG P AXIS: 20 DEGREES
EKG P-R INTERVAL: 124 MS
EKG Q-T INTERVAL: 378 MS
EKG QRS DURATION: 70 MS
EKG QTC CALCULATION (BAZETT): 430 MS
EKG R AXIS: -15 DEGREES
EKG T AXIS: 37 DEGREES
EKG VENTRICULAR RATE: 78 BPM

## 2024-09-21 PROCEDURE — 93010 ELECTROCARDIOGRAM REPORT: CPT | Performed by: INTERNAL MEDICINE

## 2025-04-08 NOTE — PROGRESS NOTES
Kenalog:  NDC: [id number]  Lot Number: [id number]  Expiration Date: 4/21 Patient assessed. Patient complaining of having an episode of feeling like he might pass out earlier today, while having a bowel movement after having constipation issues. Patient reports after the episode his blood pressure was normal and he reports no palpitations. He is unsure what his heart rate was during that time.     Patient denies any recent issues with shortness of breath, bloating, or edema. No edema noted. Weight down 4 lbs at 265.4 lbs. APN notified of all the above information. Labs ordered and drawn by  Lab. Reviewed allergies and list of current medications with patient and updated it in the Electronic Medical Record.     Educated patient on low sodium diet and food choices, fluid restriction of 2 liters, and daily weights. Reviewed follow-up appointments and discharge Heart Failure instructions with patient. Patient verbalized an understanding. Patient to return to the clinic in 1 month.